# Patient Record
Sex: FEMALE | Race: WHITE | NOT HISPANIC OR LATINO | Employment: FULL TIME | URBAN - METROPOLITAN AREA
[De-identification: names, ages, dates, MRNs, and addresses within clinical notes are randomized per-mention and may not be internally consistent; named-entity substitution may affect disease eponyms.]

---

## 2019-12-13 ENCOUNTER — OFFICE VISIT (OUTPATIENT)
Dept: INTERNAL MEDICINE | Facility: CLINIC | Age: 49
End: 2019-12-13
Payer: COMMERCIAL

## 2019-12-13 VITALS
WEIGHT: 182.6 LBS | BODY MASS INDEX: 28.66 KG/M2 | HEIGHT: 67 IN | SYSTOLIC BLOOD PRESSURE: 122 MMHG | OXYGEN SATURATION: 98 % | HEART RATE: 74 BPM | RESPIRATION RATE: 16 BRPM | DIASTOLIC BLOOD PRESSURE: 82 MMHG

## 2019-12-13 DIAGNOSIS — Z11.4 SCREENING FOR HUMAN IMMUNODEFICIENCY VIRUS WITHOUT PRESENCE OF RISK FACTORS: ICD-10-CM

## 2019-12-13 DIAGNOSIS — Z13.220 SCREENING FOR CHOLESTEROL LEVEL: ICD-10-CM

## 2019-12-13 DIAGNOSIS — Z76.89 ENCOUNTER TO ESTABLISH CARE: Primary | ICD-10-CM

## 2019-12-13 DIAGNOSIS — Z13.21 ENCOUNTER FOR VITAMIN DEFICIENCY SCREENING: ICD-10-CM

## 2019-12-13 DIAGNOSIS — Z13.1 SCREENING FOR DIABETES MELLITUS: ICD-10-CM

## 2019-12-13 DIAGNOSIS — Z13.0 SCREENING FOR BLOOD DISEASE: ICD-10-CM

## 2019-12-13 DIAGNOSIS — Z23 NEED FOR PROPHYLACTIC VACCINATION AND INOCULATION AGAINST INFLUENZA: ICD-10-CM

## 2019-12-13 DIAGNOSIS — Z86.79 HISTORY OF VENTRICULAR TACHYCARDIA: ICD-10-CM

## 2019-12-13 DIAGNOSIS — Z12.31 ENCOUNTER FOR SCREENING MAMMOGRAM FOR BREAST CANCER: ICD-10-CM

## 2019-12-13 PROCEDURE — 90686 IIV4 VACC NO PRSV 0.5 ML IM: CPT | Performed by: INTERNAL MEDICINE

## 2019-12-13 PROCEDURE — 99202 OFFICE O/P NEW SF 15 MIN: CPT | Mod: 25 | Performed by: INTERNAL MEDICINE

## 2019-12-13 PROCEDURE — 90471 IMMUNIZATION ADMIN: CPT | Performed by: INTERNAL MEDICINE

## 2019-12-13 RX ORDER — METOPROLOL SUCCINATE 25 MG/1
25 TABLET, EXTENDED RELEASE ORAL DAILY
COMMUNITY
End: 2019-12-13

## 2019-12-13 RX ORDER — METOPROLOL SUCCINATE 25 MG/1
25 TABLET, EXTENDED RELEASE ORAL DAILY
Qty: 90 TABLET | Refills: 3 | Status: SHIPPED | OUTPATIENT
Start: 2019-12-13 | End: 2021-01-29

## 2019-12-13 RX ORDER — AMOXICILLIN 500 MG/1
500 CAPSULE ORAL 2 TIMES DAILY
COMMUNITY
End: 2020-01-03

## 2019-12-13 SDOH — HEALTH STABILITY: MENTAL HEALTH: HOW OFTEN DO YOU HAVE A DRINK CONTAINING ALCOHOL?: NOT ASKED

## 2019-12-13 ASSESSMENT — MIFFLIN-ST. JEOR: SCORE: 1487.96

## 2019-12-13 NOTE — PATIENT INSTRUCTIONS
Please schedule physical, pap smear, mammogram (spring), and fasting labs when able.     Refill of Toprol sent in.

## 2019-12-13 NOTE — PROGRESS NOTES
SUBJECTIVE                                                      HPI: Xenia Snow is a pleasant 49 year old female who presents to establish care:    No specific complaints, concerns, or questions.    PMH, PSH, FH, SH, medications, allergies, immunizations, and preventative health measures reviewed.     Past Medical History:   Diagnosis Date     History of ventricular tachycardia      History of ventricular tachycardia: well-controlled on Toprol.     Past Surgical History:   Procedure Laterality Date     DILATION AND CURETTAGE       DILATION AND EVACUATION       LAPAROSCOPIC APPENDECTOMY  2018     LAPAROSCOPIC CHOLECYSTECTOMY  2017     Family History   Problem Relation Age of Onset     Hypertension Mother      Hyperlipidemia Mother      Breast Cancer Mother         post-menopausal     Skin Cancer Father         unknown types     Coronary Artery Disease Maternal Grandmother         s/p CABG later in life     Myocardial Infarction Maternal Grandfather         later in life     Breast Cancer Paternal Grandmother         post-menopausal     Myocardial Infarction Maternal Uncle         later in life     Rheumatoid Arthritis Paternal Aunt      Diabetes No family hx of      Coronary Artery Disease Early Onset No family hx of      Cerebrovascular Disease No family hx of      Ovarian Cancer No family hx of      Colon Cancer No family hx of      Social History     Occupational History     Occupation: [employer] -    Tobacco Use     Smoking status: Never Smoker     Smokeless tobacco: Never Used   Substance and Sexual Activity     Alcohol use: Never     Drug use: Never     Sexual activity: Yes     Partners: Female     Birth control/protection: Male Surgical   Social History Narrative    .    4 kids (19, 17, 15, and 12 as of 2019).    Walks regularly; no formal exercise.      Allergies   Allergen Reactions     Amitiza [Lubiprostone] GI Disturbance     Current Outpatient Medications   Medication  "Sig     amoxicillin (AMOXIL) 500 MG capsule Take 500 mg by mouth 2 times daily     metoprolol succinate ER (TOPROL-XL) 25 MG 24 hr tablet Take 1 tablet (25 mg) by mouth daily     Immunization History   Administered Date(s) Administered     Influenza Vaccine IM > 6 months Valent IIV4 12/13/2019     TDAP Vaccine (Adacel) 08/01/2019     OBJECTIVE                                                      /82   Pulse 74   Resp 16   Ht 1.689 m (5' 6.5\")   Wt 82.8 kg (182 lb 9.6 oz)   LMP 11/15/2019 (Within Days)   SpO2 98%   BMI 29.03 kg/m    Constitutional: well-appearing  Musculoskeletal: normal gait and station  Psych: normal judgment and insight; normal mood and affect; recent and remote memory intact; oriented to time, place, and person    PREVENTATIVE HEALTH                                                      BMI: overweight  Blood pressure: within normal limits   Breast CA screening: up to date (performed in March, 2019 at [facility])  Cervical CA screening: DUE  Colon CA screening: not medically indicated at this time   Lung CA screening: n/a   Dexa: not medically indicated at this time   Screening HCV: n/a   Screening HIV: DUE  Screening cholesterol: DUE  Screening diabetes: DUE  STD testing: no risk factors present  Depression screening: PHQ-2 assessment completed and reviewed - no intervention indicated at this time  Alcohol misuse screening: alcohol use reviewed - no intervention indicated at this time  Immunizations: reviewed; flu shot DUE    ASSESSMENT/PLAN                                                       (Z76.89) Encounter to establish care  (primary encounter diagnosis)  Comment: PMH, PSH, FH, SH, medications, allergies, immunizations, and preventative health measures reviewed.   Plan: patient will return for a physical and pap when able; see below for remainder of plans.     (Z23) Need for prophylactic vaccination and inoculation against influenza  Plan: flu shot given " today.     (Z12.31) Encounter for screening mammogram for breast cancer  Plan: mammogram ordered - patient to schedule for spring, 2020.     (Z13.220) Screening for cholesterol level  (Z13.1) Screening for diabetes mellitus  (Z11.4) Screening for human immunodeficiency virus without presence of risk factors  (Z13.21) Encounter for vitamin deficiency screening  (Z13.0) Screening for blood disease  Plan: patient will return for fasting labs when able.     (Z86.79) History of ventricular tachycardia  Comment: well-controlled on Toprol.   Plan: CPM; refills provided.     The instructions on the AVS were discussed and explained to the patient. Patient expressed understanding of instructions.    A total of 25 minutes were spent face-to-face with this patient during this encounter and over half of that time was spent on counseling and coordination of care re: above diagnoses and plans of care.     (Chart documentation was completed, in part, with [employer] voice-recognition software. Even though reviewed, some grammatical, spelling, and word errors may remain.)    Carol Yung MD   [address] [address]  T: [phone], F: [phone]

## 2020-01-02 DIAGNOSIS — Z13.21 ENCOUNTER FOR VITAMIN DEFICIENCY SCREENING: ICD-10-CM

## 2020-01-02 DIAGNOSIS — Z13.220 SCREENING FOR CHOLESTEROL LEVEL: ICD-10-CM

## 2020-01-02 DIAGNOSIS — Z11.4 SCREENING FOR HUMAN IMMUNODEFICIENCY VIRUS WITHOUT PRESENCE OF RISK FACTORS: ICD-10-CM

## 2020-01-02 DIAGNOSIS — Z13.1 SCREENING FOR DIABETES MELLITUS: ICD-10-CM

## 2020-01-02 DIAGNOSIS — Z13.0 SCREENING FOR BLOOD DISEASE: ICD-10-CM

## 2020-01-02 DIAGNOSIS — L65.9 HAIR LOSS: ICD-10-CM

## 2020-01-02 LAB
ALBUMIN SERPL-MCNC: 3.8 G/DL (ref 3.4–5)
ALP SERPL-CCNC: 56 U/L (ref 40–150)
ALT SERPL W P-5'-P-CCNC: 23 U/L (ref 0–50)
ANION GAP SERPL CALCULATED.3IONS-SCNC: 6 MMOL/L (ref 3–14)
AST SERPL W P-5'-P-CCNC: 13 U/L (ref 0–45)
BILIRUB SERPL-MCNC: 0.3 MG/DL (ref 0.2–1.3)
BUN SERPL-MCNC: 14 MG/DL (ref 7–30)
CALCIUM SERPL-MCNC: 8.9 MG/DL (ref 8.5–10.1)
CHLORIDE SERPL-SCNC: 109 MMOL/L (ref 94–109)
CHOLEST SERPL-MCNC: 228 MG/DL
CO2 SERPL-SCNC: 24 MMOL/L (ref 20–32)
CREAT SERPL-MCNC: 0.98 MG/DL (ref 0.52–1.04)
DEPRECATED CALCIDIOL+CALCIFEROL SERPL-MC: 21 UG/L (ref 20–75)
ERYTHROCYTE [DISTWIDTH] IN BLOOD BY AUTOMATED COUNT: 14.1 % (ref 10–15)
GFR SERPL CREATININE-BSD FRML MDRD: 68 ML/MIN/{1.73_M2}
GLUCOSE SERPL-MCNC: 116 MG/DL (ref 70–99)
HCT VFR BLD AUTO: 36 % (ref 35–47)
HDLC SERPL-MCNC: 46 MG/DL
HGB BLD-MCNC: 11.7 G/DL (ref 11.7–15.7)
HIV 1+2 AB+HIV1 P24 AG SERPL QL IA: NONREACTIVE
LDLC SERPL CALC-MCNC: 119 MG/DL
MCH RBC QN AUTO: 27.7 PG (ref 26.5–33)
MCHC RBC AUTO-ENTMCNC: 32.5 G/DL (ref 31.5–36.5)
MCV RBC AUTO: 85 FL (ref 78–100)
NONHDLC SERPL-MCNC: 182 MG/DL
PLATELET # BLD AUTO: 243 10E9/L (ref 150–450)
POTASSIUM SERPL-SCNC: 4.1 MMOL/L (ref 3.4–5.3)
PROT SERPL-MCNC: 7.5 G/DL (ref 6.8–8.8)
RBC # BLD AUTO: 4.23 10E12/L (ref 3.8–5.2)
SODIUM SERPL-SCNC: 139 MMOL/L (ref 133–144)
TRIGL SERPL-MCNC: 315 MG/DL
VIT B12 SERPL-MCNC: 294 PG/ML (ref 193–986)
WBC # BLD AUTO: 7 10E9/L (ref 4–11)

## 2020-01-02 PROCEDURE — 84443 ASSAY THYROID STIM HORMONE: CPT | Performed by: INTERNAL MEDICINE

## 2020-01-02 PROCEDURE — 82728 ASSAY OF FERRITIN: CPT | Performed by: INTERNAL MEDICINE

## 2020-01-02 PROCEDURE — 82607 VITAMIN B-12: CPT | Performed by: INTERNAL MEDICINE

## 2020-01-02 PROCEDURE — 83550 IRON BINDING TEST: CPT | Performed by: INTERNAL MEDICINE

## 2020-01-02 PROCEDURE — 80061 LIPID PANEL: CPT | Performed by: INTERNAL MEDICINE

## 2020-01-02 PROCEDURE — 80053 COMPREHEN METABOLIC PANEL: CPT | Performed by: INTERNAL MEDICINE

## 2020-01-02 PROCEDURE — 85027 COMPLETE CBC AUTOMATED: CPT | Performed by: INTERNAL MEDICINE

## 2020-01-02 PROCEDURE — 82306 VITAMIN D 25 HYDROXY: CPT | Performed by: INTERNAL MEDICINE

## 2020-01-02 PROCEDURE — 87389 HIV-1 AG W/HIV-1&-2 AB AG IA: CPT | Performed by: INTERNAL MEDICINE

## 2020-01-02 PROCEDURE — 36415 COLL VENOUS BLD VENIPUNCTURE: CPT | Performed by: INTERNAL MEDICINE

## 2020-01-02 PROCEDURE — 83540 ASSAY OF IRON: CPT | Performed by: INTERNAL MEDICINE

## 2020-01-03 ENCOUNTER — OFFICE VISIT (OUTPATIENT)
Dept: INTERNAL MEDICINE | Facility: CLINIC | Age: 50
End: 2020-01-03
Payer: COMMERCIAL

## 2020-01-03 VITALS
WEIGHT: 181.8 LBS | BODY MASS INDEX: 28.53 KG/M2 | OXYGEN SATURATION: 98 % | HEIGHT: 67 IN | SYSTOLIC BLOOD PRESSURE: 122 MMHG | RESPIRATION RATE: 18 BRPM | DIASTOLIC BLOOD PRESSURE: 82 MMHG | HEART RATE: 83 BPM

## 2020-01-03 DIAGNOSIS — Z91.09 ENVIRONMENTAL ALLERGIES: ICD-10-CM

## 2020-01-03 DIAGNOSIS — E61.1 IRON DEFICIENCY: Primary | ICD-10-CM

## 2020-01-03 DIAGNOSIS — L65.9 HAIR LOSS: ICD-10-CM

## 2020-01-03 DIAGNOSIS — Z12.4 SCREENING FOR MALIGNANT NEOPLASM OF CERVIX: ICD-10-CM

## 2020-01-03 DIAGNOSIS — Z00.00 ROUTINE HISTORY AND PHYSICAL EXAMINATION OF ADULT: Primary | ICD-10-CM

## 2020-01-03 LAB
FERRITIN SERPL-MCNC: 7 NG/ML (ref 8–252)
IRON SATN MFR SERPL: 10 % (ref 15–46)
IRON SERPL-MCNC: 40 UG/DL (ref 35–180)
TIBC SERPL-MCNC: 396 UG/DL (ref 240–430)
TSH SERPL DL<=0.005 MIU/L-ACNC: 2.24 MU/L (ref 0.4–4)

## 2020-01-03 PROCEDURE — 87624 HPV HI-RISK TYP POOLED RSLT: CPT | Performed by: INTERNAL MEDICINE

## 2020-01-03 PROCEDURE — 99396 PREV VISIT EST AGE 40-64: CPT | Performed by: INTERNAL MEDICINE

## 2020-01-03 PROCEDURE — G0145 SCR C/V CYTO,THINLAYER,RESCR: HCPCS | Performed by: INTERNAL MEDICINE

## 2020-01-03 RX ORDER — LORATADINE 10 MG/1
10 TABLET ORAL DAILY
Qty: 90 TABLET | Refills: 3 | Status: SHIPPED | OUTPATIENT
Start: 2020-01-03

## 2020-01-03 RX ORDER — FLUTICASONE PROPIONATE 50 MCG
2 SPRAY, SUSPENSION (ML) NASAL DAILY
Qty: 16 G | Refills: 3 | Status: SHIPPED | OUTPATIENT
Start: 2020-01-03 | End: 2021-01-08

## 2020-01-03 ASSESSMENT — MIFFLIN-ST. JEOR: SCORE: 1484.33

## 2020-01-03 NOTE — PATIENT INSTRUCTIONS
Try adding a daily vitamin D supplement.    Additional labs (iron studies and thyroid) pending.     ---    RESTART Claritin daily and consistently.    RESTART Flonase daily and consistently.     ---

## 2020-01-03 NOTE — PROGRESS NOTES
SUBJECTIVE                                                      HPI: Xenia Snow is a pleasant 49 year old female who presents for a physical.    Patient complains of hair loss. Generalized, nonfocal. Ongoing for years, but worsening over time. Recent labs significant for borderline vitamin D level.    Patient also complains of poorly controlled environmental allergies. Previously on Claritin for many years, but not on any antihistamines currently.    ROS:  Constitutional: denies unintentional weight loss or gain; denies fevers, chills, or sweats     Cardiovascular: denies chest pain, palpitations, or edema  Respiratory: denies cough, wheezing, shortness of breath, or dyspnea on exertion  Gastrointestinal: denies nausea, vomiting, constipation, diarrhea, or abdominal pain  Genitourinary: denies urinary frequency, urgency, dysuria, or hematuria  Integumentary: see above  Musculoskeletal: denies back pain, muscle pain, joint pain, or joint swelling  Neurologic: denies focal weakness, numbness, or tingling  Hematologic/Immunologic: denies history of anemia or blood transfusions  Endocrine: denies heat or cold intolerance; denies polyuria, polydipsia  Psychiatric: denies anxiety; see preventative health below    Past Medical History:   Diagnosis Date     History of ventricular tachycardia     on Toprol     Impaired fasting glucose      Past Surgical History:   Procedure Laterality Date     DILATION AND CURETTAGE       DILATION AND EVACUATION       LAPAROSCOPIC APPENDECTOMY  2018     LAPAROSCOPIC CHOLECYSTECTOMY  2017     Family History   Problem Relation Age of Onset     Hypertension Mother      Hyperlipidemia Mother      Breast Cancer Mother         post-menopausal     Skin Cancer Father         unknown types     Coronary Artery Disease Maternal Grandmother         s/p CABG later in life     Myocardial Infarction Maternal Grandfather         later in life     Breast Cancer Paternal Grandmother          "post-menopausal     Myocardial Infarction Maternal Uncle         later in life     Rheumatoid Arthritis Paternal Aunt      Diabetes No family hx of      Coronary Artery Disease Early Onset No family hx of      Cerebrovascular Disease No family hx of      Ovarian Cancer No family hx of      Colon Cancer No family hx of      Social History     Occupational History     Occupation: Consulting -    Tobacco Use     Smoking status: Never Smoker     Smokeless tobacco: Never Used   Substance and Sexual Activity     Alcohol use: Never     Drug use: Never     Sexual activity: Yes     Partners: Female     Birth control/protection: Male Surgical   Social History Narrative    .    4 kids (19, 17, 15, and 12 as of 2019).    Walks regularly; no formal exercise.      Allergies   Allergen Reactions     Amitiza [Lubiprostone] GI Disturbance     Current Outpatient Medications   Medication Sig     metoprolol succinate ER (TOPROL-XL) 25 MG 24 hr tablet Take 1 tablet (25 mg) by mouth daily     Immunization History   Administered Date(s) Administered     Influenza Vaccine IM > 6 months Valent IIV4 12/13/2019     TDAP Vaccine (Adacel) 08/03/2019     OBJECTIVE                                                      /82   Pulse 83   Resp 18   Ht 1.689 m (5' 6.5\")   Wt 82.5 kg (181 lb 12.8 oz)   LMP 12/13/2019 (Exact Date)   SpO2 98%   BMI 28.90 kg/m    Constitutional: well-appearing  Eyes: normal conjunctivae and lids; pupils equal, round, and reactive to light  Ears, Nose, Mouth, and Throat: normal ears and nose; tympanic membranes visualized and normal; normal lips, teeth, and gums; no oropharyngeal lesions or ulcers  Neck: supple and symmetric; no lymphadenopathy; no thyromegaly or masses  Respiratory: normal respiratory effort; clear to auscultation bilaterally  Cardiovascular: regular rate and rhythm; pedal pulses palpable; no edema  Gastrointestinal: soft, non-tender, non-distended, and bowel sounds " present; no organomegaly or masses  Genitourinary: external genitalia, urethral meatus, and vagina normal; cervix visualized and normal in appearance; uterus and adnexa palpated and normal; no masses or tenderness  Musculoskeletal: normal gait and station  Psych: normal judgment and insight; normal mood and affect; recent and remote memory intact; oriented to time, place, and person    PREVENTATIVE HEALTH                                                      BMI: overweight  Blood pressure: within normal limits   Breast CA screening: scheduled for April, 2020  Cervical CA screening: DUE  Colon CA screening: not medically indicated at this time   Lung CA screening: n/a    Dexa: not medically indicated at this time   Screening HCV: n/a   Screening HIV: completed  Screening cholesterol: up to date   Screening diabetes: up to date   STD testing: no risk factors present  Depression screening: PHQ-2 assessment completed and reviewed - no intervention indicated at this time  Alcohol misuse screening: alcohol use reviewed - no intervention indicated at this time  Immunizations: reviewed; up to date     ASSESSMENT/PLAN                                                       (Z00.00) Routine history and physical examination of adult  (primary encounter diagnosis)  Comment: PMH, PSH, FH, SH, medications, allergies, immunizations, and preventative health measures reviewed.   Plan: see below for plans.     (Z12.4) Screening for malignant neoplasm of cervix  Plan: pap smear obtained today; if normal/negative, may repeat in 5 years (all prior paps within normal limits per patient).    (L65.9) Hair loss  Plan:   - adding on TSH reflex and iron studies to yesterday's labs.   - recommend daily vitamin D supplement to boost vitamin D levels.   - potential androgenic alopecia component as well.    (Z91.09) Environmental allergies  Comment: poorly controlled without medication.  Plan:    - RESTART Claritin daily and consistently.   - START  Flonase daily and consistently.   - if continued symptoms, patient to contact MD.     The instructions on the AVS were discussed and explained to the patient. Patient expressed understanding of instructions.    (Chart documentation was completed, in part, with [employer] voice-recognition software. Even though reviewed, some grammatical, spelling, and word errors may remain.)    Carol Yung MD   [address] [address]  T: [phone], F: [phone]

## 2020-01-03 NOTE — LETTER
January 13, 2020    Xenia CARRASCO Gwendolyn  [address] [place] [id number]    Dear ,  This letter is regarding your recent Pap smear (cervical cancer screening) and Human Papillomavirus (HPV) test.  We are happy to inform you that your Pap smear result is normal. Cervical cancer is closely linked with certain types of HPV. Your results showed no evidence of high-risk HPV.  We recommend you have your next PAP smear and HPV test in 5 years.  You will still need to return to the clinic every year for an annual exam and other preventive tests.  If you have additional questions regarding this result, please call our registered nurse, Angelica at [phone].  Sincerely,    Carol Yung MD //[facility]

## 2020-01-07 LAB
COPATH REPORT: NORMAL
PAP: NORMAL

## 2020-01-09 LAB
FINAL DIAGNOSIS: NORMAL
HPV HR 12 DNA CVX QL NAA+PROBE: NEGATIVE
HPV16 DNA SPEC QL NAA+PROBE: NEGATIVE
HPV18 DNA SPEC QL NAA+PROBE: NEGATIVE
SPECIMEN DESCRIPTION: NORMAL
SPECIMEN SOURCE CVX/VAG CYTO: NORMAL

## 2020-01-23 ENCOUNTER — APPOINTMENT (OUTPATIENT)
Dept: CT IMAGING | Facility: CLINIC | Age: 50
End: 2020-01-23
Attending: EMERGENCY MEDICINE
Payer: COMMERCIAL

## 2020-01-23 ENCOUNTER — HOSPITAL ENCOUNTER (EMERGENCY)
Facility: CLINIC | Age: 50
Discharge: HOME | End: 2020-01-23
Attending: EMERGENCY MEDICINE | Admitting: EMERGENCY MEDICINE
Payer: COMMERCIAL

## 2020-01-23 ENCOUNTER — APPOINTMENT (OUTPATIENT)
Dept: MRI IMAGING | Facility: CLINIC | Age: 50
End: 2020-01-23
Attending: EMERGENCY MEDICINE
Payer: COMMERCIAL

## 2020-01-23 VITALS
RESPIRATION RATE: 18 BRPM | BODY MASS INDEX: 28.93 KG/M2 | OXYGEN SATURATION: 99 % | HEART RATE: 76 BPM | HEIGHT: 66 IN | SYSTOLIC BLOOD PRESSURE: 130 MMHG | WEIGHT: 180 LBS | DIASTOLIC BLOOD PRESSURE: 76 MMHG

## 2020-01-23 DIAGNOSIS — R51.9 NONINTRACTABLE HEADACHE, UNSPECIFIED CHRONICITY PATTERN, UNSPECIFIED HEADACHE TYPE: ICD-10-CM

## 2020-01-23 DIAGNOSIS — R42 VERTIGO: ICD-10-CM

## 2020-01-23 LAB
ANION GAP SERPL CALCULATED.3IONS-SCNC: 3 MMOL/L (ref 3–14)
APTT PPP: 33 SEC (ref 22–37)
BASOPHILS # BLD AUTO: 0 10E9/L (ref 0–0.2)
BASOPHILS NFR BLD AUTO: 0.5 %
BUN SERPL-MCNC: 11 MG/DL (ref 7–30)
CALCIUM SERPL-MCNC: 8.8 MG/DL (ref 8.5–10.1)
CHLORIDE SERPL-SCNC: 107 MMOL/L (ref 94–109)
CO2 SERPL-SCNC: 30 MMOL/L (ref 20–32)
CREAT SERPL-MCNC: 0.85 MG/DL (ref 0.52–1.04)
DIFFERENTIAL METHOD BLD: NORMAL
EOSINOPHIL # BLD AUTO: 0.1 10E9/L (ref 0–0.7)
EOSINOPHIL NFR BLD AUTO: 1.5 %
ERYTHROCYTE [DISTWIDTH] IN BLOOD BY AUTOMATED COUNT: 15 % (ref 10–15)
GFR SERPL CREATININE-BSD FRML MDRD: 81 ML/MIN/{1.73_M2}
GLUCOSE BLDC GLUCOMTR-MCNC: 95 MG/DL (ref 70–99)
GLUCOSE SERPL-MCNC: 101 MG/DL (ref 70–99)
HCT VFR BLD AUTO: 38.5 % (ref 35–47)
HGB BLD-MCNC: 12.5 G/DL (ref 11.7–15.7)
IMM GRANULOCYTES # BLD: 0 10E9/L (ref 0–0.4)
IMM GRANULOCYTES NFR BLD: 0.3 %
INR PPP: 0.9 (ref 0.86–1.14)
LYMPHOCYTES # BLD AUTO: 2.3 10E9/L (ref 0.8–5.3)
LYMPHOCYTES NFR BLD AUTO: 28.8 %
MCH RBC QN AUTO: 28.7 PG (ref 26.5–33)
MCHC RBC AUTO-ENTMCNC: 32.5 G/DL (ref 31.5–36.5)
MCV RBC AUTO: 89 FL (ref 78–100)
MONOCYTES # BLD AUTO: 0.6 10E9/L (ref 0–1.3)
MONOCYTES NFR BLD AUTO: 7.5 %
NEUTROPHILS # BLD AUTO: 4.9 10E9/L (ref 1.6–8.3)
NEUTROPHILS NFR BLD AUTO: 61.4 %
NRBC # BLD AUTO: 0 10*3/UL
NRBC BLD AUTO-RTO: 0 /100
PLATELET # BLD AUTO: 265 10E9/L (ref 150–450)
POTASSIUM SERPL-SCNC: 3.6 MMOL/L (ref 3.4–5.3)
RBC # BLD AUTO: 4.35 10E12/L (ref 3.8–5.2)
SODIUM SERPL-SCNC: 140 MMOL/L (ref 133–144)
TROPONIN I SERPL-MCNC: <0.015 UG/L (ref 0–0.04)
WBC # BLD AUTO: 7.9 10E9/L (ref 4–11)

## 2020-01-23 PROCEDURE — 0042T CT HEAD PERFUSION WITH CONTRAST: CPT

## 2020-01-23 PROCEDURE — A9585 GADOBUTROL INJECTION: HCPCS | Performed by: EMERGENCY MEDICINE

## 2020-01-23 PROCEDURE — 25000128 H RX IP 250 OP 636: Performed by: EMERGENCY MEDICINE

## 2020-01-23 PROCEDURE — 25500064 ZZH RX 255 OP 636: Performed by: EMERGENCY MEDICINE

## 2020-01-23 PROCEDURE — 99285 EMERGENCY DEPT VISIT HI MDM: CPT | Mod: 25

## 2020-01-23 PROCEDURE — 85025 COMPLETE CBC W/AUTO DIFF WBC: CPT | Performed by: EMERGENCY MEDICINE

## 2020-01-23 PROCEDURE — 85610 PROTHROMBIN TIME: CPT | Performed by: EMERGENCY MEDICINE

## 2020-01-23 PROCEDURE — 00000146 ZZHCL STATISTIC GLUCOSE BY METER IP

## 2020-01-23 PROCEDURE — 80048 BASIC METABOLIC PNL TOTAL CA: CPT | Performed by: EMERGENCY MEDICINE

## 2020-01-23 PROCEDURE — 70450 CT HEAD/BRAIN W/O DYE: CPT | Mod: XS

## 2020-01-23 PROCEDURE — 70553 MRI BRAIN STEM W/O & W/DYE: CPT

## 2020-01-23 PROCEDURE — 96374 THER/PROPH/DIAG INJ IV PUSH: CPT | Mod: 59

## 2020-01-23 PROCEDURE — 25000132 ZZH RX MED GY IP 250 OP 250 PS 637: Performed by: EMERGENCY MEDICINE

## 2020-01-23 PROCEDURE — 70496 CT ANGIOGRAPHY HEAD: CPT

## 2020-01-23 PROCEDURE — 84484 ASSAY OF TROPONIN QUANT: CPT | Performed by: EMERGENCY MEDICINE

## 2020-01-23 PROCEDURE — 85730 THROMBOPLASTIN TIME PARTIAL: CPT | Performed by: EMERGENCY MEDICINE

## 2020-01-23 PROCEDURE — 25000125 ZZHC RX 250: Performed by: EMERGENCY MEDICINE

## 2020-01-23 RX ORDER — IOPAMIDOL 755 MG/ML
500 INJECTION, SOLUTION INTRAVASCULAR ONCE
Status: COMPLETED | OUTPATIENT
Start: 2020-01-23 | End: 2020-01-23

## 2020-01-23 RX ORDER — ONDANSETRON 2 MG/ML
4 INJECTION INTRAMUSCULAR; INTRAVENOUS
Status: DISCONTINUED | OUTPATIENT
Start: 2020-01-23 | End: 2020-01-24 | Stop reason: HOSPADM

## 2020-01-23 RX ORDER — GADOBUTROL 604.72 MG/ML
7.5 INJECTION INTRAVENOUS ONCE
Status: COMPLETED | OUTPATIENT
Start: 2020-01-23 | End: 2020-01-23

## 2020-01-23 RX ORDER — MECLIZINE HYDROCHLORIDE 25 MG/1
25 TABLET ORAL ONCE
Status: COMPLETED | OUTPATIENT
Start: 2020-01-23 | End: 2020-01-23

## 2020-01-23 RX ORDER — ONDANSETRON 4 MG/1
4 TABLET, ORALLY DISINTEGRATING ORAL EVERY 8 HOURS PRN
Qty: 10 TABLET | Refills: 0 | Status: SHIPPED | OUTPATIENT
Start: 2020-01-23 | End: 2020-01-26

## 2020-01-23 RX ORDER — MECLIZINE HYDROCHLORIDE 25 MG/1
25 TABLET ORAL 3 TIMES DAILY PRN
Qty: 12 TABLET | Refills: 0 | Status: SHIPPED | OUTPATIENT
Start: 2020-01-23 | End: 2021-01-08

## 2020-01-23 RX ADMIN — MECLIZINE HYDROCHLORIDE 25 MG: 25 TABLET ORAL at 22:44

## 2020-01-23 RX ADMIN — MECLIZINE HYDROCHLORIDE 25 MG: 25 TABLET ORAL at 20:26

## 2020-01-23 RX ADMIN — GADOBUTROL 7.5 ML: 604.72 INJECTION INTRAVENOUS at 21:12

## 2020-01-23 RX ADMIN — IOPAMIDOL 120 ML: 755 INJECTION, SOLUTION INTRAVENOUS at 19:42

## 2020-01-23 RX ADMIN — ONDANSETRON HYDROCHLORIDE 4 MG: 2 INJECTION, SOLUTION INTRAMUSCULAR; INTRAVENOUS at 20:26

## 2020-01-23 RX ADMIN — SODIUM CHLORIDE 95 ML: 9 INJECTION, SOLUTION INTRAVENOUS at 19:42

## 2020-01-23 ASSESSMENT — MIFFLIN-ST. JEOR: SCORE: 1468.22

## 2020-01-23 NOTE — ED AVS SNAPSHOT
[facility] Emergency Department  [address]  [facility] [address]  Phone:  [phone]  Fax:  [phone]                                    Xenia Snow   MRN: [id number]    Department:  [facility] Emergency Department   Date of Visit:  1/23/2020           After Visit Summary Signature Page    I have received my discharge instructions, and my questions have been answered. I have discussed any challenges I see with this plan with the nurse or doctor.    ..........................................................................................................................................  Patient/Patient Representative Signature      ..........................................................................................................................................  Patient Representative Print Name and Relationship to Patient    ..................................................               ................................................  Date                                   Time    ..........................................................................................................................................  Reviewed by Signature/Title    ...................................................              ..............................................  Date                                               Time          22EPIC Rev 08/18

## 2020-01-24 LAB — INTERPRETATION ECG - MUSE: NORMAL

## 2020-01-24 NOTE — ED NOTES
Assisted pt with ambulation trial. Pt only made it into the hallway with walker before taking a seat on walker. Stated that her head felt off but feet felt steady.

## 2020-01-24 NOTE — ED PROVIDER NOTES
"  History     Chief Complaint:  Dizziness    HPI   Xenia Snow is a 49 year old female who presents dizziness, vision changes, and headache approximately 130 today the patient was looking at her phone and became suddenly dizzy, described as vertiginous dizziness and disequilibrium.  Associated with this, she a headache to the right temporal occipital scalp described as a fluttering discomfort.  Since then she has had vision changes as well as eye discomfort and nausea.  Finally she notes 3 views of her shades at home and doing so inappropriately, even though she is moves them multiple times in the past -she was concerned this might represent confusion versus discoordination.    Allergies:  Amitiza     Medications:    Toprol-XL  Claritin   Flonase  Ferrous sulfate     Past Medical History:    Ventricular tachycardia  Impaired fasting glucose    Past Surgical History:    Dilation and curettage  Dilation and evacuation   Laparoscopic appendectomy   Laparoscopic cholecystectomy     Family History:    Mother: hypertension, hyperlipidemia, breast cancer  Father: skin cancer    Social History:  Smoking Status: Never Smoker  Smokeless Tobacco: Never Used  Alcohol Use: Negative   Drug Use: Negative   Marital Status:   [2]     Review of Systems  Neuro: + as per above  GI: + as per above  All other systems reviewed and negative      Physical Exam     Patient Vitals for the past 24 hrs:   BP Pulse Heart Rate Resp SpO2 Height Weight   01/23/20 2100 126/77 84 81 -- 98 % -- --   01/23/20 2045 128/77 -- 82 -- 98 % -- --   01/23/20 2030 -- -- 86 -- 97 % -- --   01/23/20 2015 129/79 -- 80 -- 96 % -- --   01/23/20 1932 -- -- -- -- -- 1.676 m (5' 6\") 81.6 kg (180 lb)   01/23/20 1931 (!) 150/93 86 -- 18 100 % -- --        Physical Exam  Constitutional: Alert, attentive, dizzy  HENT:    Nose: Nose normal.    Mouth/Throat: Oropharynx is clear, mucous membranes are moist  Eyes: EOM are normal. Pupils are equal, round, and " reactive to light.   CV: Regular rate and rhythm, no murmurs, rubs or gallops.  Chest: Effort normal and breath sounds normal.   GI: No distension. There is no tenderness  MSK: Normal range of motion.   Neurological:   A/Ox3;   Cranial nerves 2-12 intact but difficulty participating with EOMI due to vertigo  5/5 strength throughout the upper and lower extremities;   sensation intact to light touch throughout the upper and lower extremities;   Skin: Skin is warm and dry.        Emergency Department Course     ECG:  Time: 2148  Vent. Rate 79 bpm. SC interval 156. QRS duration 88. QT/QTc 400/458. P-R-T axis 60 44 21.  Normal sinus rhythm   Normal EKG   Read time: 2150      Imaging:  Radiographic findings were communicated with the patient who voiced understanding of the findings.    CTA Head Neck with Contrast  IMPRESSION:      HEAD CTA:   1.  No evidence for hemodynamically significant stenosis intracranially or evidence for thrombolic occlusion. No aneurysm. Normal variant anatomy [place] and dural venous sinuses is noted.     NECK CTA:  1.  Normal neck CTA. Reading per radiology.    CT Head w/o Contrast  IMPRESSION:  No acute intracranial process. Reading per radiology. Discussed with Dr. Ross at 1/23/2020 7:49 PM    CT Head Perfusion w Contrast  IMPRESSION:      CT PERFUSION:  1.  Normal cerebral perfusion. Reading per radiology.    MR Brain w/o & w Contrast  IMPRESSION:  1.  No evidence for recent infarction.     2.  No intracranial mass, mass effect, or abnormal enhancement.     3.  No intracranial hemorrhage.     4.  Additional details above. Reading per radiology.    Laboratory:  CBC: WBC: 7.9, HGB 12.5, PLT: 265    BMP: Glucose 101 (H), o/w WNL (Creatinine: 0.85)    1933 Troponin I: <0.015    INR: 0.90    PTT: 33      Interventions:  2026 Antivert 25 mg PO  2026 Zofran 4 mg IV    Emergency Department Course:  Past medical records, nursing notes, and vitals reviewed.  1925: I performed an exam of  the patient and obtained history, as documented above.     Code stroke called.    The patient was sent for a CTA Head Neck, CT Head, CT Head Perfusion, MR Brain, while in the emergency department, results above.      IV was inserted and blood was drawn for laboratory testing, results above.     EKG obtained in the ED, see results above.      Medication administered.     1942 I spoke with Dr. Deras, Stroke Neurology, regarding the patient.     1949 I spoke with Dr. Lay, Radiology, regarding the patient.     2153 I rechecked and updated the patient.     Findings and plan explained to the Patient. Patient discharged home with instructions regarding supportive care, medications, and reasons to return. The importance of close follow-up was reviewed.      I personally reviewed the laboratory and results with the Patient and answered all related questions prior to discharge.     Impression & Plan      Medical Decision Making:  This is a 49-year-old female with history of ventricular tachycardia who presents for evaluation of headache, vision changes, vertigo, and possible confusion versus discoordination episode.  This is overall concerning for possible central vertigo from aneurysmal or other cervical vessel disease versus posterior circulation stroke.  Code stroke was activated and fortunately initial studies are negative.  On recheck she remains symptomatic.  MR brain was performed to rule out possible or subtle thromboembolic posterior circulation event and is fortunately negative as well.  Recheck symptoms are improved.  She does not have consistent reproducible symptoms with leftward rightward gaze and would not like to undergo Jerry-Hallpike testing at this time given a mild to moderate continued symptoms.  She is ambulatory would like to return home.  This is very reasonable given likely peripheral vertigo.  Plan continued supportive cares and ENT follow-up in 2 to 3 days.  Strict return precautions for worse  symptoms, vomiting, or any other concerns.    Diagnosis:    ICD-10-CM    1. Vertigo R42    2. Nonintractable headache, unspecified chronicity pattern, unspecified headache type R51        Disposition:  Home in improved condition      Anoop Ross MD  [facility]  [facility] Emergency Department      Discharge Medications:  Discharge Medication List as of 1/23/2020 10:41 PM      START taking these medications    Details   meclizine (ANTIVERT) 25 MG tablet Take 1 tablet (25 mg) by mouth 3 times daily as needed for dizziness, Disp-12 tablet, R-0, Local Print      ondansetron (ZOFRAN ODT) 4 MG ODT tab Take 1 tablet (4 mg) by mouth every 8 hours as needed for nausea, Disp-10 tablet, R-0, Local Print             Anoop Ross   1/23/2020   [facility] EMERGENCY DEPARTMENT       Anoop Ross MD  01/23/20 [id number]

## 2020-01-24 NOTE — ED TRIAGE NOTES
1330 started feeling suddenly dizzy. Pt states she has had vtach and it felt like a somersault, but in her head. Pt states this lasted for several seconds and then felt like she was going to pass out. Pt states she felt unaware for a while. Continues to have dizziness and nausea. States she feels off and not right. Mild headache now. Pt also describes difficulty with blinds, having trouble opening them. Vision trouble as well. RN notified MD of stroke ishan upon arrival to room.

## 2020-01-26 ENCOUNTER — TRANSFERRED RECORDS (OUTPATIENT)
Dept: HEALTH INFORMATION MANAGEMENT | Facility: CLINIC | Age: 50
End: 2020-01-26

## 2020-02-27 ENCOUNTER — OFFICE VISIT (OUTPATIENT)
Dept: URGENT CARE | Facility: URGENT CARE | Age: 50
End: 2020-02-27
Payer: COMMERCIAL

## 2020-02-27 VITALS
TEMPERATURE: 97 F | DIASTOLIC BLOOD PRESSURE: 70 MMHG | OXYGEN SATURATION: 99 % | HEART RATE: 70 BPM | SYSTOLIC BLOOD PRESSURE: 118 MMHG

## 2020-02-27 DIAGNOSIS — M54.50 ACUTE BILATERAL LOW BACK PAIN WITHOUT SCIATICA: ICD-10-CM

## 2020-02-27 DIAGNOSIS — S39.012A LOW BACK STRAIN, INITIAL ENCOUNTER: Primary | ICD-10-CM

## 2020-02-27 DIAGNOSIS — M62.830 BACK MUSCLE SPASM: ICD-10-CM

## 2020-02-27 PROCEDURE — 99214 OFFICE O/P EST MOD 30 MIN: CPT | Performed by: PHYSICIAN ASSISTANT

## 2020-02-27 RX ORDER — IBUPROFEN 800 MG/1
800 TABLET, FILM COATED ORAL EVERY 8 HOURS PRN
Qty: 30 TABLET | Refills: 0 | Status: SHIPPED | OUTPATIENT
Start: 2020-02-27 | End: 2021-01-08

## 2020-02-27 RX ORDER — HYDROCODONE BITARTRATE AND ACETAMINOPHEN 5; 325 MG/1; MG/1
1 TABLET ORAL EVERY 6 HOURS PRN
Qty: 14 TABLET | Refills: 0 | Status: SHIPPED | OUTPATIENT
Start: 2020-02-27 | End: 2020-03-01

## 2020-02-27 RX ORDER — METHOCARBAMOL 750 MG/1
750 TABLET, FILM COATED ORAL 4 TIMES DAILY PRN
Qty: 28 TABLET | Refills: 0 | Status: SHIPPED | OUTPATIENT
Start: 2020-02-27 | End: 2021-01-08

## 2020-02-27 NOTE — PROGRESS NOTES
SUBJECTIVE  HPI: Xenia Snow is a 49 year old female who presents for evaluation of back pain  Symptoms began 5 day(s) ago, have been onset gradual and are worse.  Pain is located in the low back bilateral region, with radiation to does not radiate, and are at worst a 5 on a scale of 1-10.  Recent injury:recent heavy lifting and turning/bending   Personal hx of back pain is recurrent self limited episodes of low back pain in the past.  Pain is exacerbated by: bending and changing position.  Pain is relieved by: heat and ice   Associated sx include: spasms.  Red flag symptoms: negative, fever, chills, night sweats.    Past Medical History:   Diagnosis Date     History of ventricular tachycardia     on Toprol     Impaired fasting glucose      Allergies   Allergen Reactions     Amitiza [Lubiprostone] GI Disturbance     Social History     Tobacco Use     Smoking status: Never Smoker     Smokeless tobacco: Never Used   Substance Use Topics     Alcohol use: Never     Family History   Problem Relation Age of Onset     Hypertension Mother      Hyperlipidemia Mother      Breast Cancer Mother         post-menopausal     Skin Cancer Father         unknown types     Coronary Artery Disease Maternal Grandmother         s/p CABG later in life     Myocardial Infarction Maternal Grandfather         later in life     Breast Cancer Paternal Grandmother         post-menopausal     Myocardial Infarction Maternal Uncle         later in life     Rheumatoid Arthritis Paternal Aunt      Diabetes No family hx of      Coronary Artery Disease Early Onset No family hx of      Cerebrovascular Disease No family hx of      Ovarian Cancer No family hx of      Colon Cancer No family hx of          ROS:  CONSTITUTIONAL:NEGATIVE for fever, chills, change in weight  INTEGUMENTARY/SKIN: NEGATIVE for worrisome rashes, moles or lesions  EYES: NEGATIVE for vision changes or irritation  ENT/MOUTH: NEGATIVE for ear, mouth and throat  problems  RESP:NEGATIVE for significant cough or SOB  CV: NEGATIVE for chest pain, palpitations or peripheral edema  GI: NEGATIVE for nausea, abdominal pain, heartburn, or change in bowel habits  : negative for and dysuria  MUSCULOSKELETAL: POSITIVE  for lower back pain, spasms  VASC: NEGATIVE for cool extremities  NEURO: NEGATIVE for weakness, dizziness or paresthesias    OBJECTIVE:  /70   Pulse 70   Temp 97  F (36.1  C) (Tympanic)   SpO2 99%   Back examination: Back symmetric, no curvature. ROM normal. No CVA tenderness., positive findings: limitation of motion - , tenderness to palpation lower SI joints  STRAIGHT leg raise test: not done  GENERAL APPEARANCE: healthy, alert and no distress  NECK: supple, non-tender to palpation, no adenopathy noted  RESP: lungs clear to auscultation - no rales, rhonchi or wheezes  CV: regular rates and rhythm, normal S1 S2, no murmur noted  ABDOMEN:  soft, nontender, no HSM or masses and bowel sounds normal  NEURO: Normal strength and tone with no weakness or sensory deficit noted, reflexes normal   Extremities: no peripheral edema or tenderness, peripheral pulses normal  MS:  tender to palpation with spasms  SKIN: no suspicious lesions or rashes    ASSESSMENT/IMPRESSION/PLAN      ICD-10-CM    1. Low back strain, initial encounter S39.012A ibuprofen (ADVIL/MOTRIN) 800 MG tablet     methocarbamol (ROBAXIN) 750 MG tablet     HYDROcodone-acetaminophen (NORCO) 5-325 MG tablet   2. Back muscle spasm M62.830 methocarbamol (ROBAXIN) 750 MG tablet   3. Acute bilateral low back pain without sciatica M54.5 ibuprofen (ADVIL/MOTRIN) 800 MG tablet     HYDROcodone-acetaminophen (NORCO) 5-325 MG tablet     EDUCATION      1.  Continue stretching and strengthening exercises.       2.  Continue prn heat or ice application.    Orders Placed This Encounter     ibuprofen (ADVIL/MOTRIN) 800 MG tablet     methocarbamol (ROBAXIN) 750 MG tablet     HYDROcodone-acetaminophen (NORCO) 5-325 MG  tablet     Follow up with PCP as needed

## 2021-01-08 ENCOUNTER — OFFICE VISIT (OUTPATIENT)
Dept: INTERNAL MEDICINE | Facility: CLINIC | Age: 51
End: 2021-01-08
Payer: COMMERCIAL

## 2021-01-08 VITALS
HEIGHT: 66 IN | RESPIRATION RATE: 16 BRPM | SYSTOLIC BLOOD PRESSURE: 114 MMHG | HEART RATE: 74 BPM | OXYGEN SATURATION: 99 % | WEIGHT: 174 LBS | BODY MASS INDEX: 27.97 KG/M2 | TEMPERATURE: 97.4 F | DIASTOLIC BLOOD PRESSURE: 80 MMHG

## 2021-01-08 DIAGNOSIS — Z12.31 ENCOUNTER FOR SCREENING MAMMOGRAM FOR BREAST CANCER: ICD-10-CM

## 2021-01-08 DIAGNOSIS — M54.41 CHRONIC BILATERAL LOW BACK PAIN WITH BILATERAL SCIATICA: ICD-10-CM

## 2021-01-08 DIAGNOSIS — M54.42 CHRONIC BILATERAL LOW BACK PAIN WITH BILATERAL SCIATICA: ICD-10-CM

## 2021-01-08 DIAGNOSIS — Z11.59 ENCOUNTER FOR HEPATITIS C SCREENING TEST FOR LOW RISK PATIENT: ICD-10-CM

## 2021-01-08 DIAGNOSIS — G89.29 CHRONIC BILATERAL LOW BACK PAIN WITH BILATERAL SCIATICA: ICD-10-CM

## 2021-01-08 DIAGNOSIS — Z00.00 ROUTINE HISTORY AND PHYSICAL EXAMINATION OF ADULT: Primary | ICD-10-CM

## 2021-01-08 DIAGNOSIS — Z13.220 SCREENING FOR CHOLESTEROL LEVEL: ICD-10-CM

## 2021-01-08 DIAGNOSIS — Z13.1 SCREENING FOR DIABETES MELLITUS: ICD-10-CM

## 2021-01-08 PROCEDURE — 99396 PREV VISIT EST AGE 40-64: CPT | Performed by: INTERNAL MEDICINE

## 2021-01-08 ASSESSMENT — MIFFLIN-ST. JEOR: SCORE: 1436.01

## 2021-01-08 NOTE — PROGRESS NOTES
ASSESSMENT/PLAN                                                       (Z00.00) Routine history and physical examination of adult  (primary encounter diagnosis)  Comment: PMH, PSH, FH, SH, medications, allergies, immunizations, and preventative health measures reviewed and updated as appropriate.  Plan: see below for plans.      (Z13.220) Screening for cholesterol level  (Z13.1) Screening for diabetes mellitus  (Z11.59) Encounter for hepatitis C screening test for low risk patient  Plan: fasting labs ordered - patient to schedule.     (Z12.31) Encounter for screening mammogram for breast cancer  Plan: screening mammogram ordered - patient to schedule.     (M54.42,  M54.41,  G89.29) Chronic bilateral low back pain with bilateral sciatica  Plan: referred to PT for further evaluation and treatment - patient will be contacted to schedule.      Carol Yung MD   [address] [address]  T: [phone], F: [phone]    SUBJECTIVE                                                      Xenia Snow is a very pleasant 50 year old female who presents for a physical.    Patient has been struggling with chronic lower back pain. Back pain is midline and bilateral with radiation down into both buttocks and occasionally to the left thigh. Pain is worse with prolonged activity. No lower extremity numbness, tingling, or weakness. No urinary retention or incontinence.  No fecal incontinence.    ROS:  Constitutional: no unintentional weight loss or gain reported; no fevers, chills, or sweats reported  Cardiovascular: no chest pain, palpitations, or edema reported  Respiratory: no cough, wheezing, shortness of breath, or dyspnea on exertion reported  Gastrointestinal: no nausea, vomiting, constipation, diarrhea, or abdominal pain reported  Genitourinary: no urinary frequency, urgency, dysuria, or hematuria reported  Integumentary: no rash or pruritus  reported  Musculoskeletal: see above  Neurologic: no focal weakness, numbness, or tingling reported  Hematologic: no easy bruising or bleeding reported  Endocrine: no heat or cold intolerance reported; no polyuria or polydipsia reported  Psychiatric: no anxiety or depression reported    Past Medical History:   Diagnosis Date     History of ventricular tachycardia     on Toprol     Impaired fasting glucose      Past Surgical History:   Procedure Laterality Date     DILATION AND CURETTAGE       DILATION AND EVACUATION       LAPAROSCOPIC APPENDECTOMY  2018     LAPAROSCOPIC CHOLECYSTECTOMY  2017     Family History   Problem Relation Age of Onset     Hypertension Mother      Hyperlipidemia Mother      Breast Cancer Mother         post-menopausal     Skin Cancer Father         unknown types     Coronary Artery Disease Maternal Grandmother         s/p CABG later in life     Myocardial Infarction Maternal Grandfather         later in life     Breast Cancer Paternal Grandmother         post-menopausal     Myocardial Infarction Maternal Uncle         later in life     Rheumatoid Arthritis Paternal Aunt      Diabetes No family hx of      Coronary Artery Disease Early Onset No family hx of      Cerebrovascular Disease No family hx of      Ovarian Cancer No family hx of      Colon Cancer No family hx of      Social History     Occupational History     Occupation: Consulting -    Tobacco Use     Smoking status: Never Smoker     Smokeless tobacco: Never Used   Substance and Sexual Activity     Alcohol use: Never     Drug use: Never     Sexual activity: Yes     Partners: Female     Birth control/protection: Male Surgical   Social History Narrative    .    4 kids (19, 18, 16, and 13 as of 2020).    Exercising regularly.      Allergies   Allergen Reactions     Amitiza [Lubiprostone] GI Disturbance     Current Outpatient Medications   Medication Sig     loratadine (CLARITIN) 10 MG tablet Take 1 tablet (10 mg)  "by mouth daily     metoprolol succinate ER (TOPROL-XL) 25 MG 24 hr tablet Take 1 tablet (25 mg) by mouth daily     Immunization History   Administered Date(s) Administered     Influenza Vaccine IM > 6 months Valent IIV4 12/13/2019     TDAP Vaccine (Adacel) 08/03/2019     PREVENTATIVE HEALTH                                                      BMI: overweight  Blood pressure: within normal limits   Breast CA screening: DUE  Cervical CA screening: up to date   Colon CA screening: not medically indicated at this time   Lung CA screening: n/a   Dexa: not medically indicated at this time   Screening HCV: completed  Screening HIV: DUE  Screening cholesterol: DUE  Screening diabetes: DUE  STD testing: no risk factors present  Alcohol misuse screening: alcohol use reviewed - no intervention indicated at this time  Immunizations: reviewed; up to date     OBJECTIVE                                                      /80 (BP Location: Left arm, Patient Position: Chair, Cuff Size: Adult Regular)   Pulse 74   Temp 97.4  F (36.3  C) (Temporal)   Resp 16   Ht 1.676 m (5' 6\")   Wt 78.9 kg (174 lb)   LMP 12/17/2020 (Exact Date)   SpO2 99%   BMI 28.08 kg/m    Constitutional: well-appearing  Head, Ears, and Eyes: normocephalic; normal external auditory canal and pinna; tympanic membranes visualized and normal; normal lids and conjunctivae  Neck: supple, symmetric, no thyromegaly or lymphadenopathy  Respiratory: normal respiratory effort; clear to auscultation bilaterally  Cardiovascular: regular rate and rhythm; no edema  Gastrointestinal: soft, non-tender, and non-distended; no organomegaly or masses  Musculoskeletal: normal gait and station  Psych: normal judgment and insight; normal mood and affect; recent and remote memory intact    ---  (Note was completed, in part, with [employer] voice-recognition software. Documentation was reviewed, but some grammatical, spelling, and word errors may remain.)        "

## 2021-01-14 ENCOUNTER — THERAPY VISIT (OUTPATIENT)
Dept: PHYSICAL THERAPY | Facility: CLINIC | Age: 51
End: 2021-01-14
Attending: INTERNAL MEDICINE
Payer: COMMERCIAL

## 2021-01-14 DIAGNOSIS — M54.50 CHRONIC BILATERAL LOW BACK PAIN WITHOUT SCIATICA: ICD-10-CM

## 2021-01-14 DIAGNOSIS — M54.42 CHRONIC BILATERAL LOW BACK PAIN WITH BILATERAL SCIATICA: ICD-10-CM

## 2021-01-14 DIAGNOSIS — M54.41 CHRONIC BILATERAL LOW BACK PAIN WITH BILATERAL SCIATICA: ICD-10-CM

## 2021-01-14 DIAGNOSIS — G89.29 CHRONIC BILATERAL LOW BACK PAIN WITHOUT SCIATICA: ICD-10-CM

## 2021-01-14 DIAGNOSIS — G89.29 CHRONIC BILATERAL LOW BACK PAIN WITH BILATERAL SCIATICA: ICD-10-CM

## 2021-01-14 PROCEDURE — 97161 PT EVAL LOW COMPLEX 20 MIN: CPT | Mod: GP | Performed by: PHYSICAL THERAPIST

## 2021-01-14 PROCEDURE — 97110 THERAPEUTIC EXERCISES: CPT | Mod: GP | Performed by: PHYSICAL THERAPIST

## 2021-01-14 PROCEDURE — 97530 THERAPEUTIC ACTIVITIES: CPT | Mod: GP | Performed by: PHYSICAL THERAPIST

## 2021-01-15 NOTE — PROGRESS NOTES
[facility] Initial Evaluation    Physical Therapy Initial Examination/Evaluation  January 14, 2021    Xenia Snow  is a 50 year old  female referred to physical therapy by Dr. Carol Yung for treatment of LBP.      DOI/onset Feb 2020  Mechanism of injury iniitally developed increased LBP shoveling snow. The pain has been off and on since but more frequent.  DOS n/a  Prior treatment none.    Chief Complaint:   Limited activity due to LBP.   Pain location: B LS area with occasional radiation into B buttocks  Quality: sometimes sharp  Constant/Intermittent: intermittent  Time of day: no time pattern  Symptoms have started to improve since onset.    Current pain 2/10.  Pain at best 1/10.  Pain at worst 4/10.    Symptoms aggravated by lifting and prolonged sitting at work.    Symptoms improved with movement. Recently started a regular exercise regimen at home and has noted less frequent LBP.  Also lost 25 pounds since October which has helped.     Social history:  Lives with  and 4 children.    Occupation: .  Job duties:  Computer work, prolonged sitting.    Patient having difficulty with ADLs: none.    Patient's goals are to reduce LBP.    Patient reports general health as good.  Related medical history had scoliosis as a kid.    Surgical History:  none.    Imaging: none.    Medications:  none.       Return to MD:  As needed.      Clinical Impression: Patient should benefit from continued PT intervention to establish a home back stretching and strengthening exercise program.    Subjective:  HPI                    Objective:  Standing Alignment:          Pelvic:  ASIS high L and iliac crest high L          Gait:    Gait Type:  Normal         Flexibility/Screens:   Positive screens:  Lumbar    Lower Extremity:      Decreased right lower extremity flexibility:  Hamstrings               Lumbar/SI Evaluation  ROM:  AROM Lumbar: normal    Strength: lower abdominals  4-/5; extensors 4/5  Lumbar Myotomes:  normal                Lumbar Dermtomes:  normal                Neural Tension/Mobility:      Left side:SLR; Slump or Bradley-Lumbar  negative.   Right side:   Bradley-Lumbar positive.  Right side:   Slump or SLR  negative.   Lumbar Palpation:  Palpation (lumbar): diffuse MF tightness/tenderness in B LS musculature.  Tenderness present at Left:    Quadratus Lumborum and Erector Spinae  Tenderness present at Right: Quadratus Lumborum and Erector Spinae      Spinal Segmental Conclusions: Pain with P/A glide L4-5  Normal segmental mobility lumbar spine          SI joint/Sacrum:    SI joint provocation tests (-)                                                       General     ROS    Assessment/Plan:    Patient is a 50 year old female with lumbar complaints.    Patient has the following significant findings with corresponding treatment plan.                Diagnosis 1:  LBP  Pain -  self management and education  Decreased strength - therapeutic exercise and therapeutic activities  Impaired muscle performance - neuro re-education  Decreased function - therapeutic activities    Therapy Evaluation Codes:   1) History comprised of:   Personal factors that impact the plan of care:      None.    Comorbidity factors that impact the plan of care are:      None.     Medications impacting care: None.  2) Examination of Body Systems comprised of:   Body structures and functions that impact the plan of care:      Lumbar spine.   Activity limitations that impact the plan of care are:      Lifting and Sitting.  3) Clinical presentation characteristics are:   Stable/Uncomplicated.  4) Decision-Making    Low complexity using standardized patient assessment instrument and/or measureable assessment of functional outcome.  Cumulative Therapy Evaluation is: Low complexity.    Previous and current functional limitations:  (See Goal Flow Sheet for this information)    Short term and Long term goals: (See Goal  Flow Sheet for this information)     Communication ability:  Patient appears to be able to clearly communicate and understand verbal and written communication and follow directions correctly.  Treatment Explanation - The following has been discussed with the patient:   RX ordered/plan of care  Anticipated outcomes  Possible risks and side effects  This patient would benefit from PT intervention to resume normal activities.   Rehab potential is good.    Frequency:  1 X week, once daily  Duration:  for 2 weeks tapering to 2 X a month over 4 weeks  Discharge Plan:  Achieve all LTG.  Independent in home treatment program.  Reach maximal therapeutic benefit.    Please refer to the daily flowsheet for treatment today, total treatment time and time spent performing 1:1 timed codes.

## 2021-01-21 ENCOUNTER — THERAPY VISIT (OUTPATIENT)
Dept: PHYSICAL THERAPY | Facility: CLINIC | Age: 51
End: 2021-01-21
Payer: COMMERCIAL

## 2021-01-21 ENCOUNTER — ANCILLARY PROCEDURE (OUTPATIENT)
Dept: MAMMOGRAPHY | Facility: CLINIC | Age: 51
End: 2021-01-21
Attending: INTERNAL MEDICINE
Payer: COMMERCIAL

## 2021-01-21 DIAGNOSIS — Z13.220 SCREENING FOR CHOLESTEROL LEVEL: ICD-10-CM

## 2021-01-21 DIAGNOSIS — G89.29 CHRONIC BILATERAL LOW BACK PAIN WITHOUT SCIATICA: ICD-10-CM

## 2021-01-21 DIAGNOSIS — M54.50 CHRONIC BILATERAL LOW BACK PAIN WITHOUT SCIATICA: ICD-10-CM

## 2021-01-21 DIAGNOSIS — Z12.31 ENCOUNTER FOR SCREENING MAMMOGRAM FOR BREAST CANCER: ICD-10-CM

## 2021-01-21 DIAGNOSIS — Z11.59 ENCOUNTER FOR HEPATITIS C SCREENING TEST FOR LOW RISK PATIENT: ICD-10-CM

## 2021-01-21 DIAGNOSIS — Z13.1 SCREENING FOR DIABETES MELLITUS: ICD-10-CM

## 2021-01-21 DIAGNOSIS — Z12.31 VISIT FOR SCREENING MAMMOGRAM: ICD-10-CM

## 2021-01-21 LAB
ALBUMIN SERPL-MCNC: 4 G/DL (ref 3.4–5)
ALP SERPL-CCNC: 55 U/L (ref 40–150)
ALT SERPL W P-5'-P-CCNC: 19 U/L (ref 0–50)
ANION GAP SERPL CALCULATED.3IONS-SCNC: 5 MMOL/L (ref 3–14)
AST SERPL W P-5'-P-CCNC: 12 U/L (ref 0–45)
BILIRUB SERPL-MCNC: 0.4 MG/DL (ref 0.2–1.3)
BUN SERPL-MCNC: 12 MG/DL (ref 7–30)
CALCIUM SERPL-MCNC: 8.9 MG/DL (ref 8.5–10.1)
CHLORIDE SERPL-SCNC: 106 MMOL/L (ref 94–109)
CHOLEST SERPL-MCNC: 216 MG/DL
CO2 SERPL-SCNC: 27 MMOL/L (ref 20–32)
CREAT SERPL-MCNC: 0.98 MG/DL (ref 0.52–1.04)
GFR SERPL CREATININE-BSD FRML MDRD: 68 ML/MIN/{1.73_M2}
GLUCOSE SERPL-MCNC: 106 MG/DL (ref 70–99)
HCV AB SERPL QL IA: NONREACTIVE
HDLC SERPL-MCNC: 52 MG/DL
LDLC SERPL CALC-MCNC: 122 MG/DL
NONHDLC SERPL-MCNC: 164 MG/DL
POTASSIUM SERPL-SCNC: 3.8 MMOL/L (ref 3.4–5.3)
PROT SERPL-MCNC: 7.8 G/DL (ref 6.8–8.8)
SODIUM SERPL-SCNC: 138 MMOL/L (ref 133–144)
TRIGL SERPL-MCNC: 209 MG/DL

## 2021-01-21 PROCEDURE — 77063 BREAST TOMOSYNTHESIS BI: CPT | Mod: TC | Performed by: RADIOLOGY

## 2021-01-21 PROCEDURE — 36415 COLL VENOUS BLD VENIPUNCTURE: CPT | Performed by: INTERNAL MEDICINE

## 2021-01-21 PROCEDURE — 77067 SCR MAMMO BI INCL CAD: CPT | Mod: TC | Performed by: RADIOLOGY

## 2021-01-21 PROCEDURE — 80061 LIPID PANEL: CPT | Performed by: INTERNAL MEDICINE

## 2021-01-21 PROCEDURE — 97110 THERAPEUTIC EXERCISES: CPT | Mod: GP | Performed by: PHYSICAL THERAPIST

## 2021-01-21 PROCEDURE — 86803 HEPATITIS C AB TEST: CPT | Performed by: INTERNAL MEDICINE

## 2021-01-21 PROCEDURE — 97530 THERAPEUTIC ACTIVITIES: CPT | Mod: GP | Performed by: PHYSICAL THERAPIST

## 2021-01-21 PROCEDURE — 80053 COMPREHEN METABOLIC PANEL: CPT | Performed by: INTERNAL MEDICINE

## 2021-01-29 DIAGNOSIS — Z86.79 HISTORY OF VENTRICULAR TACHYCARDIA: ICD-10-CM

## 2021-01-29 RX ORDER — METOPROLOL SUCCINATE 25 MG/1
25 TABLET, EXTENDED RELEASE ORAL DAILY
Qty: 90 TABLET | Refills: 3 | Status: SHIPPED | OUTPATIENT
Start: 2021-01-29 | End: 2022-01-11

## 2021-02-11 ENCOUNTER — THERAPY VISIT (OUTPATIENT)
Dept: PHYSICAL THERAPY | Facility: CLINIC | Age: 51
End: 2021-02-11
Payer: COMMERCIAL

## 2021-02-11 DIAGNOSIS — M54.50 CHRONIC BILATERAL LOW BACK PAIN WITHOUT SCIATICA: ICD-10-CM

## 2021-02-11 DIAGNOSIS — G89.29 CHRONIC BILATERAL LOW BACK PAIN WITHOUT SCIATICA: ICD-10-CM

## 2021-02-11 PROCEDURE — 97530 THERAPEUTIC ACTIVITIES: CPT | Mod: GP | Performed by: PHYSICAL THERAPIST

## 2021-02-11 PROCEDURE — 97110 THERAPEUTIC EXERCISES: CPT | Mod: GP | Performed by: PHYSICAL THERAPIST

## 2021-02-13 NOTE — PROGRESS NOTES
Subjective:  HPI  Physical Exam                    Objective:  System    Physical Exam    General     ROS    Assessment/Plan:    DISCHARGE REPORT    Progress reporting period is from 1-14-21 to 2-11-21.       SUBJECTIVE  Subjective changes noted by patient:   Pt. has made good progress in a short stint of PT. She still has LBP but with much less frequency than before. She has also been able to substantially increase her physical activity level without difficulty. She has resumed almost daily workouts for the first time in a long time without problems. She will continue her HEP with no further PT visits planned unless increased problems arise.      Current pain level is  2/10.     Previous pain level was  4/10.   Changes in function:  Yes (See Goal flowsheet attached for changes in current functional level)  Adverse reaction to treatment or activity: None    OBJECTIVE  Changes noted in objective findings:  ROM lumbar movts WNL. Tests - slump (-); SLR (-). Strength - lower abdominals 4/5; extensors 4/5     ASSESSMENT/PLAN  Updated problem list and treatment plan: Diagnosis 1:  LBP  Pain -  self management and education  Decreased strength - therapeutic exercise and therapeutic activities  Impaired muscle performance - neuro re-education  Decreased function - therapeutic activities  STG/LTGs have been met or progress has been made towards goals:  Yes (See Goal flow sheet completed today.)  Assessment of Progress: Patient is meeting short term goals and is progressing towards long term goals.  Self Management Plans:  Patient is independent in a home treatment program.  Patient is independent in self management of symptoms.  I have re-evaluated this patient and find that the nature, scope, duration and intensity of the therapy is appropriate for the medical condition of the patient.  Xenia continues to require the following intervention to meet STG and LTG's:  PT intervention is no longer required to meet  STG/LTG.    Recommendations:  This patient is ready to be discharged from therapy and continue their home treatment program.    Please refer to the daily flowsheet for treatment today, total treatment time and time spent performing 1:1 timed codes.

## 2021-10-01 ENCOUNTER — HOSPITAL ENCOUNTER (EMERGENCY)
Facility: CLINIC | Age: 51
Discharge: HOME | End: 2021-10-02
Attending: EMERGENCY MEDICINE | Admitting: EMERGENCY MEDICINE
Payer: COMMERCIAL

## 2021-10-01 DIAGNOSIS — N34.2 URETHRITIS: ICD-10-CM

## 2021-10-01 LAB
ALBUMIN UR-MCNC: NEGATIVE MG/DL
APPEARANCE UR: CLEAR
BACTERIA #/AREA URNS HPF: ABNORMAL /HPF
BASOPHILS # BLD AUTO: 0 10E3/UL (ref 0–0.2)
BASOPHILS NFR BLD AUTO: 1 %
BILIRUB UR QL STRIP: NEGATIVE
COLOR UR AUTO: ABNORMAL
EOSINOPHIL # BLD AUTO: 0.1 10E3/UL (ref 0–0.7)
EOSINOPHIL NFR BLD AUTO: 1 %
ERYTHROCYTE [DISTWIDTH] IN BLOOD BY AUTOMATED COUNT: 12.7 % (ref 10–15)
GLUCOSE UR STRIP-MCNC: NEGATIVE MG/DL
HCT VFR BLD AUTO: 37.9 % (ref 35–47)
HGB BLD-MCNC: 12.6 G/DL (ref 11.7–15.7)
HGB UR QL STRIP: NEGATIVE
IMM GRANULOCYTES # BLD: 0 10E3/UL
IMM GRANULOCYTES NFR BLD: 1 %
KETONES UR STRIP-MCNC: NEGATIVE MG/DL
LEUKOCYTE ESTERASE UR QL STRIP: NEGATIVE
LYMPHOCYTES # BLD AUTO: 2.5 10E3/UL (ref 0.8–5.3)
LYMPHOCYTES NFR BLD AUTO: 33 %
MCH RBC QN AUTO: 29.9 PG (ref 26.5–33)
MCHC RBC AUTO-ENTMCNC: 33.2 G/DL (ref 31.5–36.5)
MCV RBC AUTO: 90 FL (ref 78–100)
MONOCYTES # BLD AUTO: 0.5 10E3/UL (ref 0–1.3)
MONOCYTES NFR BLD AUTO: 6 %
NEUTROPHILS # BLD AUTO: 4.6 10E3/UL (ref 1.6–8.3)
NEUTROPHILS NFR BLD AUTO: 58 %
NITRATE UR QL: NEGATIVE
NRBC # BLD AUTO: 0 10E3/UL
NRBC BLD AUTO-RTO: 0 /100
PH UR STRIP: 5.5 [PH] (ref 5–7)
PLATELET # BLD AUTO: 244 10E3/UL (ref 150–450)
RBC # BLD AUTO: 4.21 10E6/UL (ref 3.8–5.2)
RBC URINE: <1 /HPF
SP GR UR STRIP: 1 (ref 1–1.03)
SQUAMOUS EPITHELIAL: 1 /HPF
UROBILINOGEN UR STRIP-MCNC: NORMAL MG/DL
WBC # BLD AUTO: 7.8 10E3/UL (ref 4–11)
WBC URINE: <1 /HPF

## 2021-10-01 PROCEDURE — 87086 URINE CULTURE/COLONY COUNT: CPT | Performed by: EMERGENCY MEDICINE

## 2021-10-01 PROCEDURE — 81001 URINALYSIS AUTO W/SCOPE: CPT | Performed by: EMERGENCY MEDICINE

## 2021-10-01 PROCEDURE — 85004 AUTOMATED DIFF WBC COUNT: CPT | Performed by: EMERGENCY MEDICINE

## 2021-10-01 PROCEDURE — 80048 BASIC METABOLIC PNL TOTAL CA: CPT | Performed by: EMERGENCY MEDICINE

## 2021-10-01 PROCEDURE — 99285 EMERGENCY DEPT VISIT HI MDM: CPT | Mod: 25

## 2021-10-01 PROCEDURE — 36415 COLL VENOUS BLD VENIPUNCTURE: CPT | Performed by: EMERGENCY MEDICINE

## 2021-10-01 RX ORDER — CEPHALEXIN 500 MG/1
500 CAPSULE ORAL ONCE
Status: DISCONTINUED | OUTPATIENT
Start: 2021-10-01 | End: 2021-10-01

## 2021-10-01 RX ORDER — PHENAZOPYRIDINE HYDROCHLORIDE 100 MG/1
100 TABLET, FILM COATED ORAL ONCE
Status: DISCONTINUED | OUTPATIENT
Start: 2021-10-01 | End: 2021-10-01

## 2021-10-01 ASSESSMENT — ENCOUNTER SYMPTOMS
ABDOMINAL PAIN: 1
DYSURIA: 0
DIFFICULTY URINATING: 0

## 2021-10-01 ASSESSMENT — MIFFLIN-ST. JEOR: SCORE: 1480.9

## 2021-10-02 ENCOUNTER — APPOINTMENT (OUTPATIENT)
Dept: CT IMAGING | Facility: CLINIC | Age: 51
End: 2021-10-02
Attending: EMERGENCY MEDICINE
Payer: COMMERCIAL

## 2021-10-02 VITALS
HEART RATE: 75 BPM | HEIGHT: 66 IN | RESPIRATION RATE: 18 BRPM | WEIGHT: 185 LBS | DIASTOLIC BLOOD PRESSURE: 78 MMHG | TEMPERATURE: 98.7 F | OXYGEN SATURATION: 98 % | SYSTOLIC BLOOD PRESSURE: 129 MMHG | BODY MASS INDEX: 29.73 KG/M2

## 2021-10-02 LAB
ANION GAP SERPL CALCULATED.3IONS-SCNC: 7 MMOL/L (ref 3–14)
BUN SERPL-MCNC: 12 MG/DL (ref 7–30)
CALCIUM SERPL-MCNC: 8.3 MG/DL (ref 8.5–10.1)
CHLORIDE BLD-SCNC: 105 MMOL/L (ref 94–109)
CO2 SERPL-SCNC: 24 MMOL/L (ref 20–32)
CREAT SERPL-MCNC: 0.95 MG/DL (ref 0.52–1.04)
GFR SERPL CREATININE-BSD FRML MDRD: 71 ML/MIN/1.73M2
GLUCOSE BLD-MCNC: 97 MG/DL (ref 70–99)
POTASSIUM BLD-SCNC: 3.6 MMOL/L (ref 3.4–5.3)
SODIUM SERPL-SCNC: 136 MMOL/L (ref 133–144)

## 2021-10-02 PROCEDURE — 74177 CT ABD & PELVIS W/CONTRAST: CPT

## 2021-10-02 PROCEDURE — 250N000011 HC RX IP 250 OP 636: Performed by: EMERGENCY MEDICINE

## 2021-10-02 PROCEDURE — 250N000009 HC RX 250: Performed by: EMERGENCY MEDICINE

## 2021-10-02 PROCEDURE — 250N000013 HC RX MED GY IP 250 OP 250 PS 637: Performed by: EMERGENCY MEDICINE

## 2021-10-02 RX ORDER — CEPHALEXIN 500 MG/1
500 CAPSULE ORAL 3 TIMES DAILY
Qty: 20 CAPSULE | Refills: 0 | Status: SHIPPED | OUTPATIENT
Start: 2021-10-02 | End: 2021-10-09

## 2021-10-02 RX ORDER — CEPHALEXIN 500 MG/1
500 CAPSULE ORAL ONCE
Status: COMPLETED | OUTPATIENT
Start: 2021-10-02 | End: 2021-10-02

## 2021-10-02 RX ORDER — PHENAZOPYRIDINE HYDROCHLORIDE 100 MG/1
100 TABLET, FILM COATED ORAL ONCE
Status: COMPLETED | OUTPATIENT
Start: 2021-10-02 | End: 2021-10-02

## 2021-10-02 RX ORDER — IOPAMIDOL 755 MG/ML
93 INJECTION, SOLUTION INTRAVASCULAR ONCE
Status: COMPLETED | OUTPATIENT
Start: 2021-10-02 | End: 2021-10-02

## 2021-10-02 RX ORDER — PHENAZOPYRIDINE HYDROCHLORIDE 100 MG/1
100 TABLET, FILM COATED ORAL 3 TIMES DAILY
Qty: 9 TABLET | Refills: 0 | Status: SHIPPED | OUTPATIENT
Start: 2021-10-02 | End: 2021-10-07

## 2021-10-02 RX ADMIN — CEPHALEXIN 500 MG: 500 CAPSULE ORAL at 01:21

## 2021-10-02 RX ADMIN — IOPAMIDOL 93 ML: 755 INJECTION, SOLUTION INTRAVENOUS at 00:25

## 2021-10-02 RX ADMIN — PHENAZOPYRIDINE HYDROCHLORIDE 100 MG: 100 TABLET ORAL at 01:21

## 2021-10-02 RX ADMIN — SODIUM CHLORIDE 67 ML: 900 INJECTION INTRAVENOUS at 00:26

## 2021-10-02 NOTE — ED TRIAGE NOTES
Patient presents with complaints of bladder pain that began around 14:30 this afternoon. Patient stated that the pain was initially intermittent, now it is constant, she denied other urinary symptoms but says she has been increasingly edgy throughout the evening.

## 2021-10-02 NOTE — ED PROVIDER NOTES
"  History   Chief Complaint:  Lower abdominal pain     HPI   Xenia Snow is a 51 year old female with history of ventricular tachycardia who presents with some midline lower abdominal pain. The patient reports that she was sitting at work today when she suddenly began having intermittent lower abdominal pain. She notes that this pain got stronger and more persistent during the courses of the day. She describes the pain as a muscular type pain that was sharp at times in her urethra. The patient has not had any vaginal discharge, dysuria or difficulty urinating.     Review of Systems   Gastrointestinal: Positive for abdominal pain.   Genitourinary: Negative for difficulty urinating, dysuria and vaginal discharge.   All other systems reviewed and are negative.      Allergies:  Amitiza [Lubiprostone]    Medications:  Claritin   Toprol     Past Medical History:    Ventricular tachycardia   Chronic bilateral back pain       Past Surgical History:    Dilation and curettage   Dilation and evacuation  Appendectomy  Cholecystectomy     Family History:    Mother: hypertension, hyperlipidemia, breast cancer  Father: skin cancer     Social History:  The patient presents with her .     Physical Exam     Patient Vitals for the past 24 hrs:   BP Temp Temp src Pulse Resp SpO2 Height Weight   10/01/21 2118 132/79 98.7  F (37.1  C) Oral 75 (!) 136 99 % 1.676 m (5' 6\") 83.9 kg (185 lb)       Physical Exam  General/Appearance: appears stated age, well-groomed, appears intermittently uncomfortable  Eyes: EOMI, no scleral injection, no icterus  ENT: MMM  Neck: supple, nl ROM, no stiffness  Respiratory: no increased WOB  GI: abd soft, non-distended, suprapubic discomfort to palpation,  no HSM, no rebound, no guarding, nl BS  MSK: PRINCE, good tone, no bony abnormality  Skin: warm and well-perfused, no rash, no edema, no ecchymosis, nl turgor  Neuro: GCS 15, alert and oriented, no gross focal neuro deficits  Psych: interacts " appropriately  Heme: no petechia, no purpura, no active bleeding        Emergency Department Course   Imaging:   CT Abdomen Pelvis W Contrast  1.  No bowel obstruction or inflammation.  2.  Mild fatty infiltration of the liver.  Reading per radiology.    Laboratory:  CBC: WBC 7.8, HGB 12.6,    BMP: Calcium 8.3 (L) o/w WNL (Creatinine 0.95)      UA with microscopic: Bacteria few o/w WNL       Emergency Department Course:    Reviewed:  I reviewed nursing notes, vitals, and past medical history.     Assessments:  2310 I obtained history and examined the patient as noted above.   2329 I rechecked and updated the patient.    0053 I rechecked and updated the patient.      Interventions:  Keflex 500 mg Oral  Pyridium 100 mg Oral     Disposition:  The patient was discharged to home.       Impression & Plan     Medical Decision Making:  This patient is a 51-year-old female who presents with midline suprapubic pain as well as urethral pain.  Pain sounds like it is sharp at times, consistent likely with urethritis.  Urinalysis is unremarkable.  She does have some mild reproducibility to palpation but is not tender in the right lower quadrant, and notes that she is status post appendectomy.  I did consider pathology such as sigmoid diverticulitis, given how tender she was suprapubically, that we did do a CT.  Thankfully this is come back is unremarkable.  I think with this felt is reasonable to treat his urethritis, especially as she endorses a stinging/sharp pain in the urethra.  We will start her on Keflex and Pyridium and send her urine for culture and have her discharged with outpatient follow-up.    Diagnosis:    ICD-10-CM    1. Urethritis  N34.2        Discharge Medications:  New Prescriptions    No medications on file       Scribe Disclosure:  Eagle ZELAYA, am serving as a scribe at 11:06 PM on 10/1/2021 to document services personally performed by Aaliyah Fregoso based on my observations and the  provider's statements to me.              Aaliyah Fregoso MD  10/02/21 0057       Aaliyah Fregoso MD  10/02/21 0131

## 2021-10-03 LAB — BACTERIA UR CULT: NORMAL

## 2021-10-03 NOTE — RESULT ENCOUNTER NOTE
Final urine culture report is negative.  Adult Negative Urine culture parameters per protocol: Any # Urogenital single or mixed organism, <10,000 col/ml single organism (cath specimen), and <50,000 col/ml single organism (midstream).  [facility] Emergency Dept discharge antibiotic prescribed (If applicable): Cephalexin  Treatment recommendations per [facility] ED Lab Result Urine Culture protocol.

## 2021-10-06 ENCOUNTER — OFFICE VISIT (OUTPATIENT)
Dept: INTERNAL MEDICINE | Facility: CLINIC | Age: 51
End: 2021-10-06
Payer: COMMERCIAL

## 2021-10-06 VITALS
HEART RATE: 80 BPM | SYSTOLIC BLOOD PRESSURE: 130 MMHG | DIASTOLIC BLOOD PRESSURE: 76 MMHG | WEIGHT: 195.8 LBS | BODY MASS INDEX: 31.6 KG/M2 | RESPIRATION RATE: 18 BRPM | TEMPERATURE: 98.2 F | OXYGEN SATURATION: 99 %

## 2021-10-06 DIAGNOSIS — N89.8 VAGINAL DISCHARGE: Primary | ICD-10-CM

## 2021-10-06 DIAGNOSIS — B37.31 YEAST VAGINITIS: ICD-10-CM

## 2021-10-06 DIAGNOSIS — R10.2 PELVIC PAIN IN FEMALE: ICD-10-CM

## 2021-10-06 LAB
CLUE CELLS: ABNORMAL
TRICHOMONAS, WET PREP: ABNORMAL
WBC'S/HIGH POWER FIELD, WET PREP: ABNORMAL
YEAST, WET PREP: PRESENT

## 2021-10-06 PROCEDURE — 99213 OFFICE O/P EST LOW 20 MIN: CPT | Performed by: PHYSICIAN ASSISTANT

## 2021-10-06 PROCEDURE — 87210 SMEAR WET MOUNT SALINE/INK: CPT | Performed by: PHYSICIAN ASSISTANT

## 2021-10-06 RX ORDER — FLUCONAZOLE 150 MG/1
150 TABLET ORAL ONCE
Qty: 2 TABLET | Refills: 0 | Status: SHIPPED | OUTPATIENT
Start: 2021-10-06 | End: 2021-10-06

## 2021-10-06 NOTE — PROGRESS NOTES
"    Assessment & Plan     Vaginal discharge  Currently on abx for urethritis  Will notify later today of results   - Wet prep - Clinic Collect    Pelvic pain in female  Has appt with gyn provider tomorrow. - this was to discuss perimenopausal symptoms.  Patient with odd episode of pelvic pain that radiates to the perineum   No rash and bilateral symptoms              BMI:   Estimated body mass index is 31.6 kg/m  as calculated from the following:    Height as of 10/1/21: 1.676 m (5' 6\").    Weight as of this encounter: 88.8 kg (195 lb 12.8 oz).   Weight management plan: Patient was referred to their PCP to discuss a diet and exercise plan.        Return in about 6 months (around 4/6/2022) for Routine Visit, regular primary provider.    JULISA Garsia [facility] SPIKE Michaels is a 51 year old who presents for the following health issues     HPI     ED/UC Followup:    Facility:  [facility]  Date of visit: 10/01/2021  Reason for visit: Urethritis  Current Status: Parts are better Abdominal pain or spasms are better, parts are worse are vaginal area is tender and feels swollen       Vaginal Symptoms  Onset/Duration: one week  Description:  Vaginal Discharge: just over the past day or so with white/ and slight itching   Itching (Pruritis): no  Burning sensation:  YES- painful /feels tender and swollen in the  area   Odor: no  Accompanying Signs & Symptoms:  Urinary symptoms: no  Abdominal pain: YES- lower pelvic pain though better than at ED visit   On pyridium   Fever: no  History:   Sexually active: no-  but not having sex lately   New Partner: no  Possibility of Pregnancy:  no  Recent antibiotic use: YES  Previous vaginitis issues: no  Precipitating or alleviating factors: None  Therapies tried and outcome: advil and medications from the ED         Review of Systems   Constitutional, HEENT, cardiovascular, pulmonary, gi and gu systems are " negative, except as otherwise noted.      Objective    /76   Pulse 80   Temp 98.2  F (36.8  C) (Tympanic)   Resp 18   Wt 88.8 kg (195 lb 12.8 oz)   LMP 09/26/2021 (Approximate)   SpO2 99%   BMI 31.60 kg/m    Body mass index is 31.6 kg/m .  Physical Exam   GENERAL: healthy, alert and no distress  ABDOMEN: soft, nontender, no hepatosplenomegaly, no masses and bowel sounds normal   (female): normal female external genitalia, normal urethral meatus , vaginal mucosa pink, moist, well rugated and vaginal discharge - white  SKIN: no suspicious lesions or rashes  NEURO: Normal strength and tone, mentation intact and speech normal

## 2021-10-07 ENCOUNTER — OFFICE VISIT (OUTPATIENT)
Dept: OBGYN | Facility: CLINIC | Age: 51
End: 2021-10-07
Payer: COMMERCIAL

## 2021-10-07 VITALS
SYSTOLIC BLOOD PRESSURE: 126 MMHG | BODY MASS INDEX: 30.86 KG/M2 | HEIGHT: 66 IN | WEIGHT: 192 LBS | DIASTOLIC BLOOD PRESSURE: 78 MMHG

## 2021-10-07 DIAGNOSIS — R37 SEXUAL DYSFUNCTION: Primary | ICD-10-CM

## 2021-10-07 PROCEDURE — 99213 OFFICE O/P EST LOW 20 MIN: CPT | Performed by: OBSTETRICS & GYNECOLOGY

## 2021-10-07 ASSESSMENT — MIFFLIN-ST. JEOR: SCORE: 1512.66

## 2021-10-10 NOTE — PATIENT INSTRUCTIONS
You can reach your [facility] Care Team any time of the day by calling [phone]. This number will put you in touch with the 24 hour nurse line if the clinic is closed.    To contact your OB/GYN Surgery Scheduler please call [phone]. This is a direct number for your care team between 8 a.m. and 4 p.m. Monday through Friday.    [facility] is open for your convenience: [phone]  Monday through Friday 8 a.m. to 8:30 p.m.  Saturday 9 a.m. to 6 p.m.  Sunday Noon to 6 p.m.    They are closed on all major holidays.

## 2021-10-10 NOTE — PROGRESS NOTES
"HPI:  Xenia Snow is a 51 year old female  Patient's last menstrual period was 2021 (exact date).  Vasectomy contraception, who presents for evaluation of sexuality concerns and concerns of potential early menopause.  Patient states that she has monthly menses without unscheduled bleeding or excessive flow or discomfort.  On further questioning the patient states that she and her  had their 25th wedding anniversary early in September.  They went away for a romantic weekend did have intercourse daily for 3 days but she felt that sexual relations were a \"struggle \"she states that a few years ago they would have relations 2-3 times a week and in the last year or 2 sexual activity has become less frequent.  She states that she and her  actually have separate bedrooms.  She states that the relationship with strong.  Her  is 52 years old and has no erectile dysfunction.  She states that with intercourse it takes her  a long period of time to have an orgasm and in the interval she becomes dry and develops pain.  She denies symptoms to suggest.  Vulvovaginitis although on 10/6/2021 the patient was the diagnosed with monilia and placed on Diflucan.  Patient states that she enjoys sexual activity and while her libido is perhaps less than it was when she was younger she still feels this is an important part of their relationship.  Patient is able to have orgasms both with her  on her own.  We had a lengthy discussion today regarding relationship issues sexuality, menopause in late reproductive year changes dryness and different methodologies that could be employed to aid in her  achieving an orgasm  and hence minimizing pain.  We discussed appropriate vaginal lubrication that could also be used in foreplay techniques that also could be helpful    Past Medical History:   Diagnosis Date     History of ventricular tachycardia     on Toprol     Impaired fasting " glucose      Past Surgical History:   Procedure Laterality Date     DILATION AND CURETTAGE       DILATION AND EVACUATION       LAPAROSCOPIC APPENDECTOMY  2018     LAPAROSCOPIC CHOLECYSTECTOMY  2017     Family History   Problem Relation Age of Onset     Hypertension Mother      Hyperlipidemia Mother      Breast Cancer Mother         post-menopausal     Skin Cancer Father         unknown types     Coronary Artery Disease Maternal Grandmother         s/p CABG later in life     Myocardial Infarction Maternal Grandfather         later in life     Breast Cancer Paternal Grandmother         post-menopausal     Myocardial Infarction Maternal Uncle         later in life     Rheumatoid Arthritis Paternal Aunt      Diabetes No family hx of      Coronary Artery Disease Early Onset No family hx of      Cerebrovascular Disease No family hx of      Ovarian Cancer No family hx of      Colon Cancer No family hx of      Social History     Socioeconomic History     Marital status:      Spouse name: Not on file     Number of children: Not on file     Years of education: Not on file     Highest education level: Not on file   Occupational History     Occupation: Consulting -    Tobacco Use     Smoking status: Never Smoker     Smokeless tobacco: Never Used   Substance and Sexual Activity     Alcohol use: Never     Drug use: Never     Sexual activity: Yes     Partners: Female     Birth control/protection: Male Surgical   Other Topics Concern     Not on file   Social History Narrative    .    4 kids (19, 18, 16, and 13 as of 2020).    Exercising regularly.      Social Determinants of Health     Financial Resource Strain:      Difficulty of Paying Living Expenses:    Food Insecurity:      Worried About Running Out of Food in the Last Year:      Ran Out of Food in the Last Year:    Transportation Needs:      Lack of Transportation (Medical):      Lack of Transportation (Non-Medical):    Physical Activity:       "Days of Exercise per Week:      Minutes of Exercise per Session:    Stress:      Feeling of Stress :    Social Connections:      Frequency of Communication with Friends and Family:      Frequency of Social Gatherings with Friends and Family:      Attends [employer] Services:      Active Member of Clubs or Organizations:      Attends Club or Organization Meetings:      Marital Status:    Intimate Partner Violence:      Fear of Current or Ex-Partner:      Emotionally Abused:      Physically Abused:      Sexually Abused:        Allergies:  Amitiza [lubiprostone]    Current Outpatient Medications   Medication Sig Dispense Refill     loratadine (CLARITIN) 10 MG tablet Take 1 tablet (10 mg) by mouth daily 90 tablet 3     metoprolol succinate ER (TOPROL-XL) 25 MG 24 hr tablet Take 1 tablet (25 mg) by mouth daily 90 tablet 3       Review Of Systems   ROS: 10 point ROS neg other than the symptoms noted above in the HPI.    Exam:  /78 (BP Location: Right arm, Patient Position: Chair, Cuff Size: Adult Regular)   Ht 1.676 m (5' 6\")   Wt 87.1 kg (192 lb)   LMP 09/26/2021 (Exact Date)   Breastfeeding No   BMI 30.99 kg/m    {Constitutional: healthy, alert and no distress    (R37) Sexual dysfunction  (primary encounter diagnosis)  Comment: The patient states that she and her  do have a strong relationship because of sleep issues had been in separate bedrooms but plan on moving back into the same bedroom.  We discussed items that could be done to maximize their interpersonal relationship and personal parts of their lives.  I do not see evidence of serious pathology at this time.  We discussed the previously diagnosed monilial vaginitis and its effect on relations  Plan: The patient will try these suggestions and let me know if there is no improvement          Mic Carmichael M.D.           "

## 2021-10-30 DIAGNOSIS — Z86.79 HISTORY OF VENTRICULAR TACHYCARDIA: ICD-10-CM

## 2021-11-01 RX ORDER — METOPROLOL SUCCINATE 25 MG/1
TABLET, EXTENDED RELEASE ORAL
Qty: 90 TABLET | Refills: 3 | OUTPATIENT
Start: 2021-11-01

## 2021-11-16 PROBLEM — G89.29 CHRONIC BILATERAL LOW BACK PAIN WITHOUT SCIATICA: Status: RESOLVED | Noted: 2021-01-14 | Resolved: 2021-11-16

## 2021-11-16 PROBLEM — M54.50 CHRONIC BILATERAL LOW BACK PAIN WITHOUT SCIATICA: Status: RESOLVED | Noted: 2021-01-14 | Resolved: 2021-11-16

## 2022-01-11 ENCOUNTER — OFFICE VISIT (OUTPATIENT)
Dept: INTERNAL MEDICINE | Facility: CLINIC | Age: 52
End: 2022-01-11
Payer: COMMERCIAL

## 2022-01-11 VITALS
RESPIRATION RATE: 16 BRPM | HEIGHT: 66 IN | SYSTOLIC BLOOD PRESSURE: 122 MMHG | HEART RATE: 91 BPM | OXYGEN SATURATION: 97 % | WEIGHT: 200 LBS | DIASTOLIC BLOOD PRESSURE: 70 MMHG | BODY MASS INDEX: 32.14 KG/M2 | TEMPERATURE: 98.6 F

## 2022-01-11 DIAGNOSIS — R73.01 IMPAIRED FASTING GLUCOSE: ICD-10-CM

## 2022-01-11 DIAGNOSIS — Z13.220 SCREENING FOR CHOLESTEROL LEVEL: ICD-10-CM

## 2022-01-11 DIAGNOSIS — Z86.79 HISTORY OF VENTRICULAR TACHYCARDIA: ICD-10-CM

## 2022-01-11 DIAGNOSIS — Z23 HIGH PRIORITY FOR 2019-NCOV VACCINE: ICD-10-CM

## 2022-01-11 DIAGNOSIS — Z23 NEED FOR PROPHYLACTIC VACCINATION AND INOCULATION AGAINST INFLUENZA: ICD-10-CM

## 2022-01-11 DIAGNOSIS — J45.20 MILD INTERMITTENT REACTIVE AIRWAY DISEASE WITHOUT COMPLICATION: ICD-10-CM

## 2022-01-11 DIAGNOSIS — Z13.1 SCREENING FOR DIABETES MELLITUS: ICD-10-CM

## 2022-01-11 DIAGNOSIS — Z12.31 ENCOUNTER FOR SCREENING MAMMOGRAM FOR BREAST CANCER: ICD-10-CM

## 2022-01-11 DIAGNOSIS — Z00.00 ROUTINE HISTORY AND PHYSICAL EXAMINATION OF ADULT: Primary | ICD-10-CM

## 2022-01-11 PROBLEM — E66.9 OBESITY (BMI 30-39.9): Status: ACTIVE | Noted: 2022-01-11

## 2022-01-11 PROCEDURE — 99396 PREV VISIT EST AGE 40-64: CPT | Mod: 25 | Performed by: INTERNAL MEDICINE

## 2022-01-11 PROCEDURE — 91300 COVID-19,PF,PFIZER (12+ YRS): CPT | Performed by: INTERNAL MEDICINE

## 2022-01-11 PROCEDURE — 90686 IIV4 VACC NO PRSV 0.5 ML IM: CPT | Performed by: INTERNAL MEDICINE

## 2022-01-11 PROCEDURE — 90471 IMMUNIZATION ADMIN: CPT | Performed by: INTERNAL MEDICINE

## 2022-01-11 PROCEDURE — 0004A COVID-19,PF,PFIZER (12+ YRS): CPT | Performed by: INTERNAL MEDICINE

## 2022-01-11 RX ORDER — ALBUTEROL SULFATE 90 UG/1
2 AEROSOL, METERED RESPIRATORY (INHALATION) EVERY 6 HOURS
Qty: 18 G | Refills: 1 | Status: SHIPPED | OUTPATIENT
Start: 2022-01-11 | End: 2022-06-27

## 2022-01-11 RX ORDER — METOPROLOL SUCCINATE 25 MG/1
25 TABLET, EXTENDED RELEASE ORAL DAILY
Qty: 90 TABLET | Refills: 3 | Status: SHIPPED | OUTPATIENT
Start: 2022-01-11 | End: 2023-01-20

## 2022-01-11 ASSESSMENT — MIFFLIN-ST. JEOR: SCORE: 1548.94

## 2022-01-11 NOTE — PROGRESS NOTES
ASSESSMENT/PLAN                                                       (Z00.00) Routine history and physical examination of adult  (primary encounter diagnosis)  Comment: PMH, PSH, FH, SH, medications, allergies, immunizations, and preventative health measures reviewed and updated as appropriate.  Plan: see below for plans.      (Z23) Need for prophylactic vaccination and inoculation against influenza  Plan: flu shot given today.     (Z23) High priority for 2019-nCoV vaccine  Plan: COVID-19 booster given today.    (Z12.31) Encounter for screening mammogram for breast cancer  Plan: screening mammogram ordered - patient to schedule.     (R73.01) Impaired fasting glucose  (Z13.220) Screening for cholesterol level  (Z13.1) Screening for diabetes mellitus  Plan: fasting labs ordered - patient to schedule.     (Z86.79) History of ventricular tachycardia  Comment: well-controlled on current regimen.    Plan: continue present management; refills provided.     (J45.20) Mild intermittent reactive airway disease without complication  Comment: well-controlled on current regimen.    Plan: continue present management; refills provided.     Carol Yung MD   [address] [address]  T: [phone], F: [phone]    SUBJECTIVE                                                      Xenia Snow is a very pleasant 51 year old female who presents for a physical.    ROS:  Constitutional: no unintentional weight loss or gain reported; no fevers, chills, or sweats reported  Cardiovascular: no chest pain, palpitations, or edema reported  Respiratory: no cough, wheezing, shortness of breath, or dyspnea on exertion reported  Gastrointestinal: no nausea, vomiting, constipation, diarrhea, or abdominal pain reported  Genitourinary: no urinary frequency, urgency, dysuria, or hematuria reported  Integumentary: no rash or pruritus reported  Musculoskeletal: no back pain, muscle pain, joint  pain, or joint swelling reported  Neurologic: no focal weakness, numbness, or tingling reported  Hematologic: no easy bruising or bleeding reported  Endocrine: no heat or cold intolerance reported; no polyuria or polydipsia reported  Psychiatric: no anxiety or depression reported    Past Medical History:   Diagnosis Date     History of ventricular tachycardia     on Toprol     Impaired fasting glucose      Obesity (BMI 30-39.9)      Past Surgical History:   Procedure Laterality Date     DILATION AND CURETTAGE       DILATION AND EVACUATION       LAPAROSCOPIC APPENDECTOMY  2018     LAPAROSCOPIC CHOLECYSTECTOMY  2017     Family History   Problem Relation Age of Onset     Hypertension Mother      Hyperlipidemia Mother      Breast Cancer Mother         post-menopausal     Skin Cancer Father         unknown types     Pacemaker Father      Valvular heart disease Father      Coronary Artery Disease Maternal Grandmother         s/p CABG later in life     Myocardial Infarction Maternal Grandfather         later in life     Breast Cancer Paternal Grandmother         post-menopausal     Myocardial Infarction Maternal Uncle         later in life     Rheumatoid Arthritis Paternal Aunt      Diabetes No family hx of      Coronary Artery Disease Early Onset No family hx of      Cerebrovascular Disease No family hx of      Ovarian Cancer No family hx of      Colon Cancer No family hx of      Social History     Occupational History     Occupation: Consulting -    Tobacco Use     Smoking status: Never Smoker     Smokeless tobacco: Never Used   Substance and Sexual Activity     Alcohol use: Never     Drug use: Never     Sexual activity: Yes     Partners: Female     Birth control/protection: Male Surgical   Social History Narrative    .    4 kids (2 adult, 2 high school).    Exercising sometimes.     Allergies   Allergen Reactions     Amitiza [Lubiprostone] GI Disturbance     Current Outpatient Medications  "  Medication Sig     albuterol (PROAIR HFA/PROVENTIL HFA/VENTOLIN HFA) 108 (90 Base) MCG/ACT inhaler Inhale 2 puffs into the lungs every 6 hours     loratadine (CLARITIN) 10 MG tablet Take 1 tablet (10 mg) by mouth daily     metoprolol succinate ER (TOPROL-XL) 25 MG 24 hr tablet Take 1 tablet (25 mg) by mouth daily     Immunization History   Administered Date(s) Administered     COVID-19,PF,Pfizer (12+ Yrs) 04/20/2021, 05/18/2021, 01/11/2022     Influenza Vaccine IM > 6 months Valent IIV4 (Alfuria,Fluzone) 12/13/2019, 10/30/2020, 01/11/2022     TDAP Vaccine (Adacel) 08/03/2019     PREVENTATIVE HEALTH                                                      BMI: obese  Blood pressure: within normal limits   Breast CA screening: DUE  Cervical CA screening: up to date   Colon CA screening: not medically indicated at this time   Lung CA screening: n/a   Dexa: not medically indicated at this time   Screening cholesterol: DUE  Screening diabetes: DUE  STD testing: no risk factors present  Alcohol misuse screening: alcohol use reviewed - no intervention indicated at this time  Immunizations: reviewed; flu shot and COVID-19 booster DUE    OBJECTIVE                                                      /70 (BP Location: Left arm, Patient Position: Chair, Cuff Size: Adult Regular)   Pulse 91   Temp 98.6  F (37  C) (Temporal)   Resp 16   Ht 1.676 m (5' 6\")   Wt 90.7 kg (200 lb)   SpO2 97%   BMI 32.28 kg/m    Constitutional: well-appearing  Head, Ears, and Eyes: normocephalic; normal external auditory canal and pinna; tympanic membranes visualized and normal; normal lids and conjunctivae  Neck: supple, symmetric, no thyromegaly or lymphadenopathy  Respiratory: normal respiratory effort; clear to auscultation bilaterally  Cardiovascular: regular rate and rhythm; no edema  Gastrointestinal: soft, non-tender, and non-distended; no organomegaly or masses  Musculoskeletal: normal gait and station  Psych: normal judgment and " insight; normal mood and affect; recent and remote memory intact    ---  (Note was completed, in part, with [employer] voice-recognition software. Documentation was reviewed, but some grammatical, spelling, and word errors may remain.)

## 2022-01-25 ENCOUNTER — ANCILLARY PROCEDURE (OUTPATIENT)
Dept: MAMMOGRAPHY | Facility: CLINIC | Age: 52
End: 2022-01-25
Attending: INTERNAL MEDICINE
Payer: COMMERCIAL

## 2022-01-25 ENCOUNTER — LAB (OUTPATIENT)
Dept: LAB | Facility: CLINIC | Age: 52
End: 2022-01-25

## 2022-01-25 DIAGNOSIS — Z12.31 VISIT FOR SCREENING MAMMOGRAM: ICD-10-CM

## 2022-01-25 DIAGNOSIS — R73.01 IMPAIRED FASTING GLUCOSE: ICD-10-CM

## 2022-01-25 DIAGNOSIS — Z12.31 ENCOUNTER FOR SCREENING MAMMOGRAM FOR BREAST CANCER: ICD-10-CM

## 2022-01-25 DIAGNOSIS — Z13.1 SCREENING FOR DIABETES MELLITUS: ICD-10-CM

## 2022-01-25 DIAGNOSIS — Z13.220 SCREENING FOR CHOLESTEROL LEVEL: ICD-10-CM

## 2022-01-25 PROBLEM — E78.1 HYPERTRIGLYCERIDEMIA: Status: ACTIVE | Noted: 2022-01-25

## 2022-01-25 LAB
ALBUMIN SERPL-MCNC: 3.5 G/DL (ref 3.4–5)
ALP SERPL-CCNC: 63 U/L (ref 40–150)
ALT SERPL W P-5'-P-CCNC: 27 U/L (ref 0–50)
ANION GAP SERPL CALCULATED.3IONS-SCNC: 5 MMOL/L (ref 3–14)
AST SERPL W P-5'-P-CCNC: 18 U/L (ref 0–45)
BILIRUB SERPL-MCNC: 0.2 MG/DL (ref 0.2–1.3)
BUN SERPL-MCNC: 12 MG/DL (ref 7–30)
CALCIUM SERPL-MCNC: 8.7 MG/DL (ref 8.5–10.1)
CHLORIDE BLD-SCNC: 108 MMOL/L (ref 94–109)
CHOLEST SERPL-MCNC: 183 MG/DL
CO2 SERPL-SCNC: 25 MMOL/L (ref 20–32)
CREAT SERPL-MCNC: 0.93 MG/DL (ref 0.52–1.04)
FASTING STATUS PATIENT QL REPORTED: YES
GFR SERPL CREATININE-BSD FRML MDRD: 75 ML/MIN/1.73M2
GLUCOSE BLD-MCNC: 112 MG/DL (ref 70–99)
HBA1C MFR BLD: 5.6 % (ref 0–5.6)
HDLC SERPL-MCNC: 30 MG/DL
LDLC SERPL CALC-MCNC: ABNORMAL MG/DL
NONHDLC SERPL-MCNC: 153 MG/DL
POTASSIUM BLD-SCNC: 4.3 MMOL/L (ref 3.4–5.3)
PROT SERPL-MCNC: 7.3 G/DL (ref 6.8–8.8)
SODIUM SERPL-SCNC: 138 MMOL/L (ref 133–144)
TRIGL SERPL-MCNC: 516 MG/DL

## 2022-01-25 PROCEDURE — 83036 HEMOGLOBIN GLYCOSYLATED A1C: CPT

## 2022-01-25 PROCEDURE — 77067 SCR MAMMO BI INCL CAD: CPT | Mod: TC | Performed by: RADIOLOGY

## 2022-01-25 PROCEDURE — 36415 COLL VENOUS BLD VENIPUNCTURE: CPT

## 2022-01-25 PROCEDURE — 77063 BREAST TOMOSYNTHESIS BI: CPT | Mod: TC | Performed by: RADIOLOGY

## 2022-01-25 PROCEDURE — 80053 COMPREHEN METABOLIC PANEL: CPT

## 2022-01-25 PROCEDURE — 80061 LIPID PANEL: CPT

## 2022-03-21 ENCOUNTER — ANCILLARY PROCEDURE (OUTPATIENT)
Dept: GENERAL RADIOLOGY | Facility: CLINIC | Age: 52
End: 2022-03-21
Attending: PHYSICIAN ASSISTANT
Payer: COMMERCIAL

## 2022-03-21 ENCOUNTER — OFFICE VISIT (OUTPATIENT)
Dept: URGENT CARE | Facility: URGENT CARE | Age: 52
End: 2022-03-21
Payer: COMMERCIAL

## 2022-03-21 ENCOUNTER — NURSE TRIAGE (OUTPATIENT)
Dept: NURSING | Facility: CLINIC | Age: 52
End: 2022-03-21
Payer: COMMERCIAL

## 2022-03-21 VITALS
RESPIRATION RATE: 16 BRPM | DIASTOLIC BLOOD PRESSURE: 90 MMHG | TEMPERATURE: 98.4 F | HEART RATE: 97 BPM | WEIGHT: 200 LBS | SYSTOLIC BLOOD PRESSURE: 120 MMHG | BODY MASS INDEX: 32.28 KG/M2 | OXYGEN SATURATION: 96 %

## 2022-03-21 DIAGNOSIS — R05.9 COUGH: ICD-10-CM

## 2022-03-21 DIAGNOSIS — J20.9 ACUTE BRONCHITIS, UNSPECIFIED ORGANISM: Primary | ICD-10-CM

## 2022-03-21 PROCEDURE — 71046 X-RAY EXAM CHEST 2 VIEWS: CPT | Performed by: RADIOLOGY

## 2022-03-21 PROCEDURE — 99214 OFFICE O/P EST MOD 30 MIN: CPT | Performed by: PHYSICIAN ASSISTANT

## 2022-03-21 RX ORDER — PREDNISONE 20 MG/1
40 TABLET ORAL DAILY
Qty: 10 TABLET | Refills: 0 | Status: SHIPPED | OUTPATIENT
Start: 2022-03-21 | End: 2022-03-26

## 2022-03-21 RX ORDER — ALBUTEROL SULFATE 90 UG/1
1-2 AEROSOL, METERED RESPIRATORY (INHALATION) EVERY 6 HOURS
Qty: 8.5 G | Refills: 0 | Status: SHIPPED | OUTPATIENT
Start: 2022-03-21 | End: 2022-03-26

## 2022-03-21 NOTE — TELEPHONE ENCOUNTER
Patient has had a cold for 2 weeks.  Since Thursday it has been really bad.  She has been on the  and not able to eat.  Patient took a covid test and it was negative.    Patient denies a fever.  Patient states when she coughs she has yellowish/tanish stuff come out.  She states she can hear some wheezing when she breaths.  Patient also states some tightness and sharpness in her chest.    Care advise: go to ED for evaluation.  Patient states she would prefer to go to [facility] to see if they can help.  Patient will plan to go there now.    Yola Howard RN   03/21/22 6:33 PM  [facility] Nurse Advisor      Reason for Disposition    Chest pain  (Exception: MILD central chest pain, present only when coughing)    Additional Information    Negative: Severe difficulty breathing (e.g., struggling for each breath, speaks in single words)    Negative: Bluish (or gray) lips or face now    Negative: [1] Difficulty breathing AND [2] exposure to flames, smoke, or fumes    Negative: [1] Stridor AND [2] difficulty breathing    Negative: Sounds like a life-threatening emergency to the triager    Negative: [1] Previous asthma attacks AND [2] this feels like asthma attack    Negative: Dry (non-productive) cough (i.e., no sputum or minimal clear sputum)    Protocols used: COUGH - ACUTE [id number]

## 2022-03-21 NOTE — LETTER
[facility]  [address] [address]  [phone]      March 21, 2022    RE:  Xenia LUNA Gwendolyn                                                                                                                                                       [address] [place] [address]            To whom it may concern:    Xenia Cherryaffrey was seen in clinic. I recommend resting for the next 5 days.        Sincerely,        JULISA Tapia [facility]

## 2022-03-22 NOTE — PROGRESS NOTES
Es   URGENT CARE VISIT:    SUBJECTIVE:  Xenia Snow is a 51 year old female who presents with a  productive cough and stuffy nose for 2 week(s). Symptoms are moderate and worsening. She denies fever and sore throat. She has tried OTC cough suppressants with no relief of symptoms. Symptoms are exacerbated by no particular triggers. Recent sick contacts include none. She denies a history of asthma. Patient denies history of smoking.    PMH:   Past Medical History:   Diagnosis Date     History of ventricular tachycardia     on Toprol     Hypertriglyceridemia      Impaired fasting glucose      Obesity (BMI 30-39.9)      Allergies: Amitiza [lubiprostone]  Medications:   Current Outpatient Medications   Medication Sig Dispense Refill     albuterol (PROAIR HFA/PROVENTIL HFA/VENTOLIN HFA) 108 (90 Base) MCG/ACT inhaler Inhale 1-2 puffs into the lungs every 6 hours for 5 days 8.5 g 0     albuterol (PROAIR HFA/PROVENTIL HFA/VENTOLIN HFA) 108 (90 Base) MCG/ACT inhaler Inhale 2 puffs into the lungs every 6 hours 18 g 1     loratadine (CLARITIN) 10 MG tablet Take 1 tablet (10 mg) by mouth daily 90 tablet 3     metoprolol succinate ER (TOPROL-XL) 25 MG 24 hr tablet Take 1 tablet (25 mg) by mouth daily 90 tablet 3     predniSONE (DELTASONE) 20 MG tablet Take 2 tablets (40 mg) by mouth daily for 5 days 10 tablet 0     Social History:   Social History     Tobacco Use     Smoking status: Never Smoker     Smokeless tobacco: Never Used   Substance Use Topics     Alcohol use: Never       ROS: ROS otherwise found to be negative except as noted above.    OBJECTIVE:  BP (!) 120/90   Pulse 97   Temp 98.4  F (36.9  C) (Tympanic)   Resp 16   Wt 90.7 kg (200 lb)   SpO2 96%   BMI 32.28 kg/m    General: WDWN in NAD.   Eyes: Non-injected conjunctivas without drainage bilaterally.  Nose: Clear rhinorrhea.  Ears: Bilateral TMs without erythema, injection, or effusion. No erythema or edema of external canals.   Oropharynx: Mildly  erythematous oropharynx. No posterior pharyngeal erythema or exudate.  No oral lesions.  Uvula is midline without erythema or edema.  Cardiac: RRR without murmurs, rubs, or gallops.  Respiratory: LCTAB without adventitious sounds. Non-labored breathing.  Lymph nodes: no cervical adenopathy.    Imaging:  Results for orders placed or performed in visit on 03/21/22   XR Chest 2 Views     Status: None    Narrative    EXAM: XR CHEST 2 VW  LOCATION: [facility]  DATE/TIME: 3/21/2022 7:17 PM    INDICATION:  Cough  COMPARISON: None.      Impression    IMPRESSION: Negative chest.       ASSESSMENT:    ICD-10-CM    1. Acute bronchitis, unspecified organism  J20.9 XR Chest 2 Views     albuterol (PROAIR HFA/PROVENTIL HFA/VENTOLIN HFA) 108 (90 Base) MCG/ACT inhaler     predniSONE (DELTASONE) 20 MG tablet        PLAN:  30 minutes spent on the date of the encounter doing chart review, review of outside records, review of test results, interpretation of tests, patient visit and documentation   Patient Instructions   Patient was educated on the natural course of viral condition. Chest x-ray was negative for pneumonia. Take medications as directed. Side effects discussed. Conservative measures discussed including rest, increased fluids, humidifier, and over-the-counter cough suppressants. See your primary care provider if symptoms do not improve in 5 days. Seek emergency care if you develop shortness of breath or fever over 103.     Patient verbalized understanding and is agreeable to plan. The patient was discharged ambulatory and in stable condition.    Sabrina Verma PA-C ....................  3/21/2022   7:46 PM

## 2022-03-22 NOTE — PATIENT INSTRUCTIONS
Patient was educated on the natural course of viral condition. Chest x-ray was negative for pneumonia. Take medications as directed. Side effects discussed. Conservative measures discussed including rest, increased fluids, humidifier, and over-the-counter cough suppressants. See your primary care provider if symptoms do not improve in 5 days. Seek emergency care if you develop shortness of breath or fever over 103.

## 2022-03-28 ENCOUNTER — OFFICE VISIT (OUTPATIENT)
Dept: INTERNAL MEDICINE | Facility: CLINIC | Age: 52
End: 2022-03-28
Payer: COMMERCIAL

## 2022-03-28 VITALS
TEMPERATURE: 96.8 F | BODY MASS INDEX: 32.28 KG/M2 | OXYGEN SATURATION: 99 % | SYSTOLIC BLOOD PRESSURE: 132 MMHG | DIASTOLIC BLOOD PRESSURE: 80 MMHG | HEART RATE: 82 BPM | RESPIRATION RATE: 22 BRPM | WEIGHT: 200 LBS

## 2022-03-28 DIAGNOSIS — R05.9 COUGH: Primary | ICD-10-CM

## 2022-03-28 PROCEDURE — 99213 OFFICE O/P EST LOW 20 MIN: CPT | Performed by: INTERNAL MEDICINE

## 2022-03-28 RX ORDER — GUAIFENESIN/DEXTROMETHORPHAN 100-10MG/5
10 SYRUP ORAL EVERY 4 HOURS PRN
Qty: 236 ML | Refills: 1 | Status: SHIPPED | OUTPATIENT
Start: 2022-03-28 | End: 2022-07-13

## 2022-03-28 RX ORDER — PREDNISONE 20 MG/1
TABLET ORAL
Qty: 11 TABLET | Refills: 0 | Status: SHIPPED | OUTPATIENT
Start: 2022-03-28 | End: 2022-04-07

## 2022-03-28 NOTE — LETTER
03/28/22      Xenia Snow  1970  [address] [place] [address]        To whom it may concern,    Please excuse Ms. Snow from work 3/21/22-3/30/22. She will be needing time to rest and recuperate from an illness that I am seeing her for. She may return to work 3/31/22 without restrictions.    Please contact me with any question or concerns.        Carol Yung MD   [place] [address] [place] [address]  T: [phone], F: [phone]

## 2022-03-28 NOTE — PROGRESS NOTES
ASSESSMENT/PLAN                                                      (R05.9) Cough  (primary encounter diagnosis)  Plan: oral steroid taper prescribed; guaifenesin-dextromethorphan as needed for cough; if symptoms worsen, change, or do not improve, patient to contact MD; letter provided for work.    Carol Yung MD   [place] [address] [place] [address]  T: [phone], F: [phone]    SUBJECTIVE                                                      Xenia Snow is a very pleasant 51 year old female who presents for follow-up:    Patient was seen in urgent care last week with a productive cough and stuffy nose. Negative home Covid test. CXR negative. Diagnosed with bronchitis and prescribed an oral steroid burst and an albuterol inhaler. Patient reports that she is feeling much better, but continues to have a productive cough, ear crackling, and excessive fatigue. No fevers or chills. No sinus congestion, pressure, or pain. No wheezing or shortness of breath. No chest pain, tightness, or palpitations.    OBJECTIVE                                                      /80 (BP Location: Left arm, Patient Position: Chair, Cuff Size: Adult Large)   Pulse 82   Temp 96.8  F (36  C) (Temporal)   Resp 22   Wt 90.7 kg (200 lb)   SpO2 99%   BMI 32.28 kg/m    Constitutional: well-appearing  Head, Ears, Nose, and Mouth: normocephalic, atraumatic; normal external auditory canal and pinna; tympanic membranes visualized and normal; nasal septum straight; no oropharyngeal lesions or ulcers  Respiratory: normal respiratory effort; clear to auscultation bilaterally; occasional cough    ---    (Note documentation was completed, in part, with [employer] voice-recognition software. Documentation was reviewed, but some grammatical, spelling, and word errors may remain.)

## 2022-06-27 ENCOUNTER — ANCILLARY PROCEDURE (OUTPATIENT)
Dept: CT IMAGING | Facility: CLINIC | Age: 52
End: 2022-06-27
Attending: STUDENT IN AN ORGANIZED HEALTH CARE EDUCATION/TRAINING PROGRAM
Payer: COMMERCIAL

## 2022-06-27 ENCOUNTER — OFFICE VISIT (OUTPATIENT)
Dept: FAMILY MEDICINE | Facility: CLINIC | Age: 52
End: 2022-06-27
Payer: COMMERCIAL

## 2022-06-27 ENCOUNTER — ANCILLARY PROCEDURE (OUTPATIENT)
Dept: ULTRASOUND IMAGING | Facility: CLINIC | Age: 52
End: 2022-06-27
Attending: STUDENT IN AN ORGANIZED HEALTH CARE EDUCATION/TRAINING PROGRAM
Payer: COMMERCIAL

## 2022-06-27 ENCOUNTER — OFFICE VISIT (OUTPATIENT)
Dept: PEDIATRICS | Facility: CLINIC | Age: 52
End: 2022-06-27
Payer: COMMERCIAL

## 2022-06-27 VITALS
DIASTOLIC BLOOD PRESSURE: 74 MMHG | BODY MASS INDEX: 31.5 KG/M2 | RESPIRATION RATE: 16 BRPM | HEIGHT: 66 IN | HEART RATE: 76 BPM | OXYGEN SATURATION: 99 % | TEMPERATURE: 98 F | SYSTOLIC BLOOD PRESSURE: 124 MMHG | WEIGHT: 196 LBS

## 2022-06-27 VITALS
DIASTOLIC BLOOD PRESSURE: 95 MMHG | RESPIRATION RATE: 16 BRPM | TEMPERATURE: 98 F | SYSTOLIC BLOOD PRESSURE: 148 MMHG | HEART RATE: 79 BPM | OXYGEN SATURATION: 97 %

## 2022-06-27 DIAGNOSIS — R10.2 PELVIC PAIN IN FEMALE: ICD-10-CM

## 2022-06-27 DIAGNOSIS — B37.31 YEAST VAGINITIS: ICD-10-CM

## 2022-06-27 DIAGNOSIS — K31.89 MASS OF DUODENUM: Primary | ICD-10-CM

## 2022-06-27 DIAGNOSIS — N89.8 VAGINAL ITCHING: Primary | ICD-10-CM

## 2022-06-27 DIAGNOSIS — R10.32 LLQ ABDOMINAL PAIN: ICD-10-CM

## 2022-06-27 LAB
ALBUMIN SERPL-MCNC: 3.7 G/DL (ref 3.4–5)
ALP SERPL-CCNC: 64 U/L (ref 40–150)
ALT SERPL W P-5'-P-CCNC: 24 U/L (ref 0–50)
ANION GAP SERPL CALCULATED.3IONS-SCNC: 4 MMOL/L (ref 3–14)
AST SERPL W P-5'-P-CCNC: 17 U/L (ref 0–45)
BILIRUB SERPL-MCNC: 0.4 MG/DL (ref 0.2–1.3)
BUN SERPL-MCNC: 14 MG/DL (ref 7–30)
CALCIUM SERPL-MCNC: 9.4 MG/DL (ref 8.5–10.1)
CHLORIDE BLD-SCNC: 108 MMOL/L (ref 94–109)
CLUE CELLS: ABNORMAL
CO2 SERPL-SCNC: 27 MMOL/L (ref 20–32)
CREAT SERPL-MCNC: 0.97 MG/DL (ref 0.52–1.04)
ERYTHROCYTE [DISTWIDTH] IN BLOOD BY AUTOMATED COUNT: 13.6 % (ref 10–15)
GFR SERPL CREATININE-BSD FRML MDRD: 71 ML/MIN/1.73M2
GLUCOSE BLD-MCNC: 109 MG/DL (ref 70–99)
HCT VFR BLD AUTO: 34.9 % (ref 35–47)
HGB BLD-MCNC: 11.8 G/DL (ref 11.7–15.7)
MCH RBC QN AUTO: 29.6 PG (ref 26.5–33)
MCHC RBC AUTO-ENTMCNC: 33.8 G/DL (ref 31.5–36.5)
MCV RBC AUTO: 88 FL (ref 78–100)
PLATELET # BLD AUTO: 241 10E3/UL (ref 150–450)
POTASSIUM BLD-SCNC: 4.2 MMOL/L (ref 3.4–5.3)
PROT SERPL-MCNC: 7.1 G/DL (ref 6.8–8.8)
RADIOLOGIST FLAGS: ABNORMAL
RBC # BLD AUTO: 3.98 10E6/UL (ref 3.8–5.2)
SODIUM SERPL-SCNC: 139 MMOL/L (ref 133–144)
TRICHOMONAS, WET PREP: ABNORMAL
WBC # BLD AUTO: 9.2 10E3/UL (ref 4–11)
WBC'S/HIGH POWER FIELD, WET PREP: ABNORMAL
YEAST, WET PREP: PRESENT

## 2022-06-27 PROCEDURE — 99215 OFFICE O/P EST HI 40 MIN: CPT

## 2022-06-27 PROCEDURE — 76856 US EXAM PELVIC COMPLETE: CPT | Performed by: RADIOLOGY

## 2022-06-27 PROCEDURE — 87491 CHLMYD TRACH DNA AMP PROBE: CPT | Performed by: FAMILY MEDICINE

## 2022-06-27 PROCEDURE — 74177 CT ABD & PELVIS W/CONTRAST: CPT | Performed by: RADIOLOGY

## 2022-06-27 PROCEDURE — 80053 COMPREHEN METABOLIC PANEL: CPT

## 2022-06-27 PROCEDURE — 85027 COMPLETE CBC AUTOMATED: CPT

## 2022-06-27 PROCEDURE — 93976 VASCULAR STUDY: CPT | Mod: 59 | Performed by: RADIOLOGY

## 2022-06-27 PROCEDURE — 99207 REFERRAL TO ACUTE AND DIAGNOSTIC SERVICES: CPT | Performed by: FAMILY MEDICINE

## 2022-06-27 PROCEDURE — 87210 SMEAR WET MOUNT SALINE/INK: CPT | Performed by: FAMILY MEDICINE

## 2022-06-27 PROCEDURE — 36415 COLL VENOUS BLD VENIPUNCTURE: CPT

## 2022-06-27 PROCEDURE — 87591 N.GONORRHOEAE DNA AMP PROB: CPT | Performed by: FAMILY MEDICINE

## 2022-06-27 PROCEDURE — 76830 TRANSVAGINAL US NON-OB: CPT | Performed by: RADIOLOGY

## 2022-06-27 RX ORDER — FLUCONAZOLE 150 MG/1
150 TABLET ORAL ONCE
Qty: 1 TABLET | Refills: 0 | Status: SHIPPED | OUTPATIENT
Start: 2022-06-27 | End: 2022-06-27

## 2022-06-27 RX ORDER — FLUCONAZOLE 150 MG/1
TABLET ORAL
Qty: 2 TABLET | Refills: 0 | Status: SHIPPED | OUTPATIENT
Start: 2022-06-27 | End: 2022-07-13

## 2022-06-27 RX ORDER — IOPAMIDOL 755 MG/ML
110 INJECTION, SOLUTION INTRAVASCULAR ONCE
Status: COMPLETED | OUTPATIENT
Start: 2022-06-27 | End: 2022-06-27

## 2022-06-27 RX ADMIN — IOPAMIDOL 110 ML: 755 INJECTION, SOLUTION INTRAVASCULAR at 13:45

## 2022-06-27 ASSESSMENT — PAIN SCALES - GENERAL: PAINLEVEL: MODERATE PAIN (5)

## 2022-06-27 ASSESSMENT — ASTHMA QUESTIONNAIRES
QUESTION_4 LAST FOUR WEEKS HOW OFTEN HAVE YOU USED YOUR RESCUE INHALER OR NEBULIZER MEDICATION (SUCH AS ALBUTEROL): NOT AT ALL
QUESTION_1 LAST FOUR WEEKS HOW MUCH OF THE TIME DID YOUR ASTHMA KEEP YOU FROM GETTING AS MUCH DONE AT WORK, SCHOOL OR AT HOME: A LITTLE OF THE TIME
QUESTION_5 LAST FOUR WEEKS HOW WOULD YOU RATE YOUR ASTHMA CONTROL: COMPLETELY CONTROLLED
QUESTION_3 LAST FOUR WEEKS HOW OFTEN DID YOUR ASTHMA SYMPTOMS (WHEEZING, COUGHING, SHORTNESS OF BREATH, CHEST TIGHTNESS OR PAIN) WAKE YOU UP AT NIGHT OR EARLIER THAN USUAL IN THE MORNING: NOT AT ALL
ACT_TOTALSCORE: 24
ACT_TOTALSCORE: 24
QUESTION_2 LAST FOUR WEEKS HOW OFTEN HAVE YOU HAD SHORTNESS OF BREATH: NOT AT ALL

## 2022-06-27 NOTE — Clinical Note
Alfreda - I saw Edith in [facility] today for pelvic pain and incidentally on CT found a 3.2 cm mass adjacent to the duodenum/proximal jejunum. Radiology recommends MRI or endoscopic US for further imaging of the mass. I ordered the MRI and scheduling it closer to home and also set up an appointment with you to go over MRI results. Just wanted to give you a heads up. Thanks, Yanira

## 2022-06-27 NOTE — PROGRESS NOTES
ICD-10-CM    1. Vaginal itching  N89.8 Wet prep - Clinic Collect     Chlamydia trachomatis PCR     Neisseria gonorrhoeae PCR     CBC with platelets and differential   2. Pelvic pain in female  R10.2 US Pelvic Complete with Transvaginal     CBC with platelets and differential   3. Yeast vaginitis  B37.3 fluconazole (DIFLUCAN) 150 MG tablet     New to this provider and clinic  Initially was here for vaginal irritation but later in the visit reported of pelvic acute pain this am.  She is on her period but reports this pain is new to her.  No history of std, no bowel issues, no fever, no UTI concerns  She has history of vaginitis and many years ago ovarian cyst. She is now endorsing that pain may be similar to her previous ovarian cyst diagnosis.  She is on her period , and  Irregular and is not on contraception but reports her  has had a vasectomy.  On vaginal exam she was tearful and reported pain, I did have a chaperon in the room (my MA ) during the entire pelvic exam.  She does have yeast on wet prep which we are treating but given the level of pain on exam I do think she needs to be evaluated further at [facility] center with imaging test as well as other labs that will come back right away.  Discussed case with provider and they are accepting her, discussed with patient and is in agreement        Spent  30min greater than 50% of counseling and coordination of care for the conditions documented above.        Awa Michaels is a 52 year old accompanied by her ., presenting for the following health issues:  Vaginal Problem (Yeast concern )    Patient declines SELF COLLECT and wants a vaginal exam today.     History of Present Illness       Reason for visit:  Yeast infection  Symptom onset:  1-3 days ago  Symptoms include:  Vaginal distress  Symptom intensity:  Severe  Symptom progression:  Worsening  Had these symptoms before:  Yes  Has tried/received treatment for these symptoms:  Yes  Previous treatment  was successful:  Yes  Prior treatment description:  Diflucan  What makes it worse:  Taking a bath  What makes it better:  Pain & allergy medicine    She eats 4 or more servings of fruits and vegetables daily.She consumes 2 sweetened beverage(s) daily.She exercises with enough effort to increase her heart rate 20 to 29 minutes per day.  She exercises with enough effort to increase her heart rate 6 days per week.   She is taking medications regularly.       NEW TO THIS CLINIC  Drove all the way from [facility]    She took a bath and started to have severe itching/pain, no urinary sx, she reports of not feeling well this am  some itching,, no discharge  Periods just started today, not her normal period pain    She reports this am horrible pelvic pain, new to her, no upper respiratory infection or urinary tract infection sx  Her  has had a vasectomy, no history of ectopic    No bowel changes, no fever  She denies similar symptoms      No std  Sexually active , no history of std  New lubricant that she used    Patient now reports of many years ago having ovarian cyst        Referral to Acute and Diagnostic Services    [facility] has been contacted at [phone] ([place]) to confirm patient acceptance. The transition to Acute & Diagnostic Services Clinic has been discussed with patient, and she agrees with next level of care.  Patient understands that evaluation/treatment at [facility] typically takes significantly longer than in clinic/urgent care (>2 hours).          Special issues:  None          Referral placed: Yes  Patient has transportation arranged and will travel to the [place] without delay: Yes  Patient aware not to eat or drink. Yes    The following provider has assessed this patient for intervention at [facility], and directed the patient for referral: Madi Tegede Ethan, DO    Hellina Tegagne Ethan, DO      Review of Systems   Constitutional, HEENT,  "cardiovascular, pulmonary, GI, , musculoskeletal, neuro, skin, endocrine and psych systems are negative, except as otherwise noted.      Objective    /74   Pulse 76   Temp 98  F (36.7  C) (Oral)   Resp 16   Ht 1.676 m (5' 6\")   Wt 88.9 kg (196 lb)   LMP 06/27/2022   SpO2 99%   BMI 31.64 kg/m    Body mass index is 31.64 kg/m .  Physical Exam   GENERAL: healthy, alert and no distress  NECK: no adenopathy, no asymmetry, masses, or scars and thyroid normal to palpation  RESP: lungs clear to auscultation - no rales, rhonchi or wheezes  CV: regular rate and rhythm, normal S1 S2, no S3 or S4, no murmur, click or rub, no peripheral edema and peripheral pulses strong  ABDOMEN: soft, nontender, no hepatosplenomegaly, no masses and bowel sounds normal   (female): normal female external genitalia, normal urethral meatus, vaginal mucosa, some blood from cervical, no mass seen,  normal cervix tender on bimanual exam,  Fullness of uterus,   MS: no gross musculoskeletal defects noted, no edema    Results for orders placed or performed in visit on 06/27/22   Wet prep - Clinic Collect     Status: Abnormal    Specimen: Vagina; Swab   Result Value Ref Range    Trichomonas Absent Absent    Yeast Present (A) Absent    Clue Cells Absent Absent    WBCs/high power field 1+ (A) None                   .  ..  "

## 2022-06-27 NOTE — PROGRESS NOTES
"  Assessment & Plan     Mass of duodenum  Patient presented with pelvic pain bilaterally and vaginal yeast infection. She is very tender externally particularly in the LLQ on exam and moderate in the RLQ. Her pelvic ultrasound is normal. Given the degree of tenderness I recommended further imaging with CT to rule out diverticulitis. The CT shows no evidence of diverticulitis however it did show a 3.2 cm ill-defined mass in the area of the duodenum/proximal jejunum with differential of inflammatory vs desmoid tumor vs malignancy. MRI or endoscopic US recommended for subsequent imaging to further evaluate the mass. She is not having symptoms in the mid or upper abdomen though she has had intermittent bloating and nausea but has had those symptoms for years if she eats certain foods. I ordered the MRI and am scheduling her for a visit with her PCP to go over the results. I suspect the tenderness in the LLQ and RLQ is from the yeast infection causing irritation as the pain was first elicited on bimanual exam at her first appointment today. She is going to treat with fluconazole and monitor those symptoms.   - MR Abdomen w/o & w Contrast    Pelvic pain in female  - US Pelvis Cmplt w Transvag & Doppler LmtPel Duplex Limited    Yeast vaginitis  - fluconazole (DIFLUCAN) 150 MG tablet  Dispense: 2 tablet; Refill: 0    LLQ abdominal pain  - CT Abdomen Pelvis w Contrast  - Comprehensive metabolic panel (BMP + Alb, Alk Phos, ALT, AST, Total. Bili, TP)  - CBC with platelets        60 minutes spent on the date of the encounter doing chart review, review of test results, interpretation of tests, patient visit and documentation      BMI:   Estimated body mass index is 31.64 kg/m  as calculated from the following:    Height as of an earlier encounter on 6/27/22: 1.676 m (5' 6\").    Weight as of an earlier encounter on 6/27/22: 88.9 kg (196 lb).       No follow-ups on file.    [facility] " MATT Michaels is a 52 year old presenting for the following health issues:  Abdominal Pain      HPI     Patient states she started to experience abdominal pain and yeast infection symptoms for a few days now. She states yesterday she took a bath and caused her yeast infection symptoms to increase.     Patient also just got her period today as well. She states during her pelvic exam, the discomfort sent her into tears.     Pain History:  When did you first notice your pain? - Acute Pain   Have you seen anyone else for your pain? Yes - Primary Care  Where in your body do you have pain? Abdominal/Flank Pain  Onset/Duration: A few days  Description:   Character: Cramping  Location: pelvic region  Radiation: None  Intensity: moderate  Progression of Symptoms:  worsening  Accompanying Signs & Symptoms:  Fever/Chills: no  Gas/Bloating: YES  Nausea: YES, slightly. No appetite  Vomitting: no  Diarrhea: no  Constipation: no  Dysuria or Hematuria: no  History:   Trauma: no  Previous similar pain: no  Previous tests done: none  Precipitating factors:   Does the pain change with:     Food: no    Bowel Movement: no    Urination: no   Other factors:  no  Therapies tried and outcome: Patient was prescribed diflucan today during her visit to her primary.      Reminds her of when she had ovarian cysts many years ago. Had some GI symptoms a couple days ago with bloating that has mostly resolved. Has bloating from time to time if she eats something that upsets her stomach.       Patient's last menstrual period was 06/27/2022. her period was 3 days late but flow is similar to what it always is on the first day.     Wet prep in clinic was positive for yeast and diflucan ordered by Dr. Long. She doesn't usually have this degree of pain with a yeast infection.     History of appendectomy.     Patient Active Problem List   Diagnosis     Impaired fasting glucose     History of ventricular tachycardia     Obesity (BMI  30-39.9)     Hypertriglyceridemia     Current Outpatient Medications   Medication     fluconazole (DIFLUCAN) 150 MG tablet     loratadine (CLARITIN) 10 MG tablet     metoprolol succinate ER (TOPROL-XL) 25 MG 24 hr tablet     albuterol (PROAIR HFA/PROVENTIL HFA/VENTOLIN HFA) 108 (90 Base) MCG/ACT inhaler     guaiFENesin-dextromethorphan (ROBITUSSIN DM) 100-10 MG/5ML syrup     No current facility-administered medications for this visit.         Review of Systems   Constitutional, HEENT, cardiovascular, pulmonary, GI, , musculoskeletal, neuro, skin, endocrine and psych systems are negative, except as otherwise noted.      Objective    BP (!) 148/95 (BP Location: Right arm, Patient Position: Chair, Cuff Size: Adult Regular)   Pulse 79   Temp 98  F (36.7  C) (Oral)   Resp 16   LMP 06/27/2022   SpO2 97%   There is no height or weight on file to calculate BMI.  Physical Exam   GENERAL: healthy, alert and no acute distress  ABDOMEN: tenderness RLQ and LLQ, LLQ > RLQ and no organomegaly or masses  MS: no gross musculoskeletal defects noted, no edema  SKIN: no suspicious lesions or rashes  NEURO: Normal strength and tone, mentation intact and speech normal  PSYCH: mentation appears normal, affect normal/bright    Results for orders placed or performed in visit on 06/27/22 (from the past 24 hour(s))   US Pelvis Cmplt w Transvag & Doppler LmtPel Duplex Limited    Narrative    EXAMINATION: US PELVIS COMPLETE W TRANSVAGINAL AND DOPPLER LIMITED  6/27/2022 12:10 PM      CLINICAL HISTORY:  pelvic pain that started a couple days ago, is very tender overlying  ovaries bilaterally, left > right; Pelvic pain in female    COMPARISON: CT 10/2/2021    PROCEDURE COMMENT: Both transabdominal and transvaginal imaging  performed of the pelvis with color and spectral Doppler.    FINDINGS:  The uterus measures  6.0 cm x 11.3 cm x 6.9 cm. No focal myometrial  mass.     The endometrial stripe measures 19 mm. The endometrial stripe  is  ill-defined but is within normal limits due to current menstruation.    The right ovary measures 2.7 cm x 1.6 cm x 1.8 cm. The left ovary  measures 2.9 cm x 3.1 cm x 2.9 cm. Normal ovarian morphology.     There is no simple free fluid in the pelvis.  No adnexal mass.      Impression    IMPRESSION:  1. Normal pelvic ultrasound.    BILLIE FERNANDES MD         SYSTEM ID:  [id number]   Comprehensive metabolic panel (BMP + Alb, Alk Phos, ALT, AST, Total. Bili, TP)   Result Value Ref Range    Sodium 139 133 - 144 mmol/L    Potassium 4.2 3.4 - 5.3 mmol/L    Chloride 108 94 - 109 mmol/L    Carbon Dioxide (CO2) 27 20 - 32 mmol/L    Anion Gap 4 3 - 14 mmol/L    Urea Nitrogen 14 7 - 30 mg/dL    Creatinine 0.97 0.52 - 1.04 mg/dL    Calcium 9.4 8.5 - 10.1 mg/dL    Glucose 109 (H) 70 - 99 mg/dL    Alkaline Phosphatase 64 40 - 150 U/L    AST 17 0 - 45 U/L    ALT 24 0 - 50 U/L    Protein Total 7.1 6.8 - 8.8 g/dL    Albumin 3.7 3.4 - 5.0 g/dL    Bilirubin Total 0.4 0.2 - 1.3 mg/dL    GFR Estimate 71 >60 mL/min/1.73m2   CBC with platelets   Result Value Ref Range    WBC Count 9.2 4.0 - 11.0 10e3/uL    RBC Count 3.98 3.80 - 5.20 10e6/uL    Hemoglobin 11.8 11.7 - 15.7 g/dL    Hematocrit 34.9 (L) 35.0 - 47.0 %    MCV 88 78 - 100 fL    MCH 29.6 26.5 - 33.0 pg    MCHC 33.8 31.5 - 36.5 g/dL    RDW 13.6 10.0 - 15.0 %    Platelet Count 241 150 - 450 10e3/uL   CT Abdomen Pelvis w Contrast   Result Value Ref Range    Radiologist flags Small bowel mass (Urgent)     Narrative    Examination:  CT ABDOMEN PELVIS W CONTRAST 6/27/2022 2:10 PM     History: Left lower quadrant abdominal pain, rule out diverticulitis    Comparison: Pelvic ultrasound 6/27/2022    Technique: CT of the abdomen and pelvis were obtained with contrast.  Sagittal and coronal reconstructions created and reviewed. Contrast  dose: iopamidol (ISOVUE-370) solution 110 mL    Findings:     Abdomen and pelvis: Noncirrhotic morphology of the liver. Diffusely  hypoattenuating hepatic  parenchyma. Fluid attenuation cystic lesion  within hepatic segment 3 (image 3:128). Ill-defined focal parenchymal  hypoattenuation adjacent to the gallbladder fossa measuring  approximately 2.8 cm (image 3:30). Gallbladder is surgically absent.  No intra- or extrahepatic biliary dilation. Spleen, pancreas, and  adrenal glands are unremarkable. Kidneys demonstrate symmetric  enhancement without solid lesions, hydronephrosis, or nephrolithiasis.  Ureters and urinary bladder are unremarkable. Uterus is heterogeneous  and mildly enlarged for age with suggestion of junctional zone  thickening. Multiple small nabothian cysts. The right ovary is  positioned posterior to the uterus. Dominant left ovarian follicle  measuring 2.1 cm.    No evidence of bowel obstruction. A few scattered colonic diverticula.  No evidence of acute diverticulitis. The appendix is surgically  absent. Ill-defined masslike soft tissue abnormality adjacent to the  distal duodenum/proximal jejunum measuring 3.2 cm (image 3:172).    No suspicious abdominal or pelvic lymphadenopathy. No free fluid. No  pneumoperitoneum.    Abdominal aorta is normal in caliber and patent. Celiac and mesenteric  artery origins are unremarkable. Portal veins and IVC are patent.    Lower thorax: Stable 3 mm solid nodule in the right lung base (image  3:42).    Bones and soft tissues: No acute or suspicious osseous abnormality.      Impression    Impression:   1. No acute findings in the left lower quadrant quadrant to explain  left lower quadrant pain. No evidence of acute diverticulitis.  2. Ill-defined masslike soft tissue abnormality adjacent to the distal  duodenum/proximal jejunum, nonspecific, could be inflammatory or  neoplastic. A desmoid tumor could have a similar appearance. Consider  further characterization with abdominal MRI or endoscopic ultrasound  with tissue sampling.  3. Focal hepatic parenchymal hypoattenuation adjacent to the  gallbladder fossa, likely  focal fatty deposition, increased since  prior exam.  4. Findings suggestive of adenomyosis.    [Access Center: Small bowel mass]    This report will be copied to the [facility] to ensure a  provider acknowledges the finding. Access Center is available Monday  through Friday 8am-3:30 pm.     I have personally reviewed the examination and initial interpretation  and I agree with the findings.    JALEESA ELLINGTON DO         SYSTEM ID:  [id number]     No results found for this visit on 06/27/22.  No results found.                .  ..

## 2022-06-28 LAB
C TRACH DNA SPEC QL NAA+PROBE: NEGATIVE
N GONORRHOEA DNA SPEC QL NAA+PROBE: NEGATIVE

## 2022-06-28 NOTE — RESULT ENCOUNTER NOTE
All your results are essentially mauricio. Please contact me if you have any questions.  Take care,  Madi Long D.O.

## 2022-07-10 ENCOUNTER — OFFICE VISIT (OUTPATIENT)
Dept: URGENT CARE | Facility: URGENT CARE | Age: 52
End: 2022-07-10
Payer: COMMERCIAL

## 2022-07-10 VITALS
OXYGEN SATURATION: 98 % | DIASTOLIC BLOOD PRESSURE: 83 MMHG | TEMPERATURE: 97.5 F | SYSTOLIC BLOOD PRESSURE: 130 MMHG | WEIGHT: 196 LBS | BODY MASS INDEX: 31.64 KG/M2 | RESPIRATION RATE: 16 BRPM | HEART RATE: 89 BPM

## 2022-07-10 DIAGNOSIS — M54.50 ACUTE BILATERAL LOW BACK PAIN WITHOUT SCIATICA: ICD-10-CM

## 2022-07-10 DIAGNOSIS — R30.0 DYSURIA: ICD-10-CM

## 2022-07-10 DIAGNOSIS — R10.30 LOWER ABDOMINAL PAIN: Primary | ICD-10-CM

## 2022-07-10 LAB
ALBUMIN UR-MCNC: NEGATIVE MG/DL
APPEARANCE UR: CLEAR
BACTERIA #/AREA URNS HPF: ABNORMAL /HPF
BASOPHILS # BLD AUTO: 0.1 10E3/UL (ref 0–0.2)
BASOPHILS NFR BLD AUTO: 1 %
BILIRUB UR QL STRIP: NEGATIVE
CLUE CELLS: ABNORMAL
COLOR UR AUTO: YELLOW
EOSINOPHIL # BLD AUTO: 0.1 10E3/UL (ref 0–0.7)
EOSINOPHIL NFR BLD AUTO: 1 %
ERYTHROCYTE [DISTWIDTH] IN BLOOD BY AUTOMATED COUNT: 13.9 % (ref 10–15)
GLUCOSE UR STRIP-MCNC: NEGATIVE MG/DL
HCT VFR BLD AUTO: 36.1 % (ref 35–47)
HGB BLD-MCNC: 11.7 G/DL (ref 11.7–15.7)
HGB UR QL STRIP: NEGATIVE
KETONES UR STRIP-MCNC: NEGATIVE MG/DL
LEUKOCYTE ESTERASE UR QL STRIP: NEGATIVE
LYMPHOCYTES # BLD AUTO: 1.6 10E3/UL (ref 0.8–5.3)
LYMPHOCYTES NFR BLD AUTO: 18 %
MCH RBC QN AUTO: 29.4 PG (ref 26.5–33)
MCHC RBC AUTO-ENTMCNC: 32.4 G/DL (ref 31.5–36.5)
MCV RBC AUTO: 91 FL (ref 78–100)
MONOCYTES # BLD AUTO: 0.4 10E3/UL (ref 0–1.3)
MONOCYTES NFR BLD AUTO: 5 %
MUCOUS THREADS #/AREA URNS LPF: PRESENT /LPF
NEUTROPHILS # BLD AUTO: 6.5 10E3/UL (ref 1.6–8.3)
NEUTROPHILS NFR BLD AUTO: 75 %
NITRATE UR QL: NEGATIVE
PH UR STRIP: 5.5 [PH] (ref 5–7)
PLATELET # BLD AUTO: 242 10E3/UL (ref 150–450)
RBC # BLD AUTO: 3.98 10E6/UL (ref 3.8–5.2)
RBC #/AREA URNS AUTO: ABNORMAL /HPF
SP GR UR STRIP: >=1.03 (ref 1–1.03)
SQUAMOUS #/AREA URNS AUTO: ABNORMAL /LPF
TRICHOMONAS, WET PREP: ABNORMAL
UROBILINOGEN UR STRIP-ACNC: 0.2 E.U./DL
WBC # BLD AUTO: 8.7 10E3/UL (ref 4–11)
WBC #/AREA URNS AUTO: ABNORMAL /HPF
WBC'S/HIGH POWER FIELD, WET PREP: ABNORMAL
YEAST, WET PREP: ABNORMAL

## 2022-07-10 PROCEDURE — 99214 OFFICE O/P EST MOD 30 MIN: CPT | Performed by: FAMILY MEDICINE

## 2022-07-10 PROCEDURE — 36415 COLL VENOUS BLD VENIPUNCTURE: CPT | Performed by: FAMILY MEDICINE

## 2022-07-10 PROCEDURE — 81001 URINALYSIS AUTO W/SCOPE: CPT | Performed by: FAMILY MEDICINE

## 2022-07-10 PROCEDURE — 85025 COMPLETE CBC W/AUTO DIFF WBC: CPT | Performed by: FAMILY MEDICINE

## 2022-07-10 PROCEDURE — 87210 SMEAR WET MOUNT SALINE/INK: CPT | Performed by: FAMILY MEDICINE

## 2022-07-10 NOTE — PROGRESS NOTES
SUBJECTIVE: Xenia Snow is a 52 year old female presenting with a chief complaint of low abd pain and back pain.  Onset of symptoms was day(s) ago.  Current and Associated symptoms: dysuria  Treatment measures tried include diflucan for yeats.  Predisposing factors include None.    Past Medical History:   Diagnosis Date     History of ventricular tachycardia     on Toprol     Hypertriglyceridemia      Impaired fasting glucose      Obesity (BMI 30-39.9)      Allergies   Allergen Reactions     Amitiza [Lubiprostone] GI Disturbance     Social History     Tobacco Use     Smoking status: Never Smoker     Smokeless tobacco: Never Used   Substance Use Topics     Alcohol use: Never       ROS:  SKIN: no rash  GI: no vomiting    OBJECTIVE:  /83   Pulse 89   Temp 97.5  F (36.4  C) (Tympanic)   Resp 16   Wt 88.9 kg (196 lb)   LMP 06/27/2022   SpO2 98%   BMI 31.64 kg/m     GENERAL APPEARANCE: healthy, alert and no distress  ABDOMEN:  soft, bilateral low tender  SKIN: no suspicious lesions or rashes  No CVA pain    Xray without acute findings, no obstruction/free air read by Slade Jack D.O.      ICD-10-CM    1. Lower abdominal pain  R10.30 UMIC - Urine Micro Only     CBC with Platelets & Differential     XR Abdomen 2 Views   2. Dysuria  R30.0 Wet prep - Clinic Collect     UA macro with reflex to Microscopic and Culture - Clinc Collect     CBC with Platelets & Differential     XR Abdomen 2 Views   3. Acute bilateral low back pain without sciatica  M54.50 UMIC - Urine Micro Only     CBC with Platelets & Differential     XR Abdomen 2 Views     Has MRI tomorrow  Fluids/Rest, f/u if worse/not any better

## 2022-07-11 ENCOUNTER — ANCILLARY PROCEDURE (OUTPATIENT)
Dept: MRI IMAGING | Facility: CLINIC | Age: 52
End: 2022-07-11
Attending: STUDENT IN AN ORGANIZED HEALTH CARE EDUCATION/TRAINING PROGRAM
Payer: COMMERCIAL

## 2022-07-11 DIAGNOSIS — K31.89 MASS OF DUODENUM: ICD-10-CM

## 2022-07-11 PROCEDURE — 255N000002 HC RX 255 OP 636: Performed by: STUDENT IN AN ORGANIZED HEALTH CARE EDUCATION/TRAINING PROGRAM

## 2022-07-11 PROCEDURE — A9585 GADOBUTROL INJECTION: HCPCS | Performed by: STUDENT IN AN ORGANIZED HEALTH CARE EDUCATION/TRAINING PROGRAM

## 2022-07-11 PROCEDURE — 74183 MRI ABD W/O CNTR FLWD CNTR: CPT

## 2022-07-11 RX ORDER — GADOBUTROL 604.72 MG/ML
9 INJECTION INTRAVENOUS ONCE
Status: COMPLETED | OUTPATIENT
Start: 2022-07-11 | End: 2022-07-11

## 2022-07-11 RX ADMIN — GADOBUTROL 9 ML: 604.72 INJECTION INTRAVENOUS at 10:33

## 2022-07-13 DIAGNOSIS — K63.89 MESENTERIC MASS: Primary | ICD-10-CM

## 2022-07-14 ENCOUNTER — PATIENT OUTREACH (OUTPATIENT)
Dept: SURGERY | Facility: CLINIC | Age: 52
End: 2022-07-14

## 2022-07-14 DIAGNOSIS — K63.89 MESENTERIC MASS: Primary | ICD-10-CM

## 2022-07-14 NOTE — PROGRESS NOTES
New Patient Oncology Nurse Navigator Note   Referring provider: Dr. 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      Clinical History (per Nurse review of records provided):      See book marked documents:       Referring MD office note    Imaging reports     Lab Results   Component Value Date/Time    ALBUMIN 3.7 06/27/2022 12:48 PM    ALBUMIN 4.0 01/21/2021 08:33 AM    AST 17 06/27/2022 12:48 PM    AST 12 01/21/2021 08:33 AM    ALT 24 06/27/2022 12:48 PM    ALT 19 01/21/2021 08:33 AM    ALKPHOS 64 06/27/2022 12:48 PM    ALKPHOS 55 01/21/2021 08:33 AM    BILITOTAL 0.4 06/27/2022 12:48 PM    BILITOTAL 0.4 01/21/2021 08:33 AM    WBC 8.7 07/10/2022 09:52 AM    WBC 7.9 01/23/2020 07:33 PM          Past Medical History:   Diagnosis Date     History of ventricular tachycardia     on Toprol     Hypertriglyceridemia      Impaired fasting glucose      Obesity (BMI 30-39.9)        Past Surgical History:   Procedure Laterality Date     DILATION AND CURETTAGE       DILATION AND EVACUATION       LAPAROSCOPIC APPENDECTOMY  2018     LAPAROSCOPIC CHOLECYSTECTOMY  2017       Current Outpatient Medications   Medication Sig Dispense Refill     albuterol (PROAIR HFA/PROVENTIL HFA/VENTOLIN HFA) 108 (90 Base) MCG/ACT inhaler Inhale 1-2 puffs into the lungs every 6 hours for 5 days 8.5 g 0     loratadine (CLARITIN) 10 MG tablet Take 1 tablet (10 mg) by mouth daily 90 tablet 3     metoprolol succinate ER (TOPROL-XL) 25 MG 24 hr tablet Take 1 tablet (25 mg) by mouth daily 90 tablet 3           Allergies   Allergen Reactions     Amitiza [Lubiprostone] GI Disturbance          Patient Active Problem List   Diagnosis     Impaired fasting glucose     History of ventricular tachycardia     Obesity (BMI 30-39.9)     Hypertriglyceridemia         Clinical Assessment  / Barriers to Care (Per Nurse):    None at this time.     [facility]     Records Needed:     NONE AT THIS TIME      Additional testing needed prior to consult:     EUS    Referral updates and Plan:       Consult with Surgical Oncology   EUS biopsy     07/14/2022 3:43 PM - Called and spoke with patient regarding referral. Informed her I am in process of reviewing with MD.     07/15/2022 2:46 PM - Called and spoke with patient regarding plan for biopsy with GI prior to consult. Informed her that we will place referral for them to contact her to arrange biopsy. Informed her I will continue to monitor the chart for the appointment and then we will contact her to arrange for surgical consult about 1 week post biopsy. She agreed with plan and will await the scheduling calls.     Asiya Bee, RN, BSN   Surgical Oncology New Patient Nurse Navigator  [facility]  [phone]

## 2022-07-15 ENCOUNTER — PATIENT OUTREACH (OUTPATIENT)
Dept: GASTROENTEROLOGY | Facility: CLINIC | Age: 52
End: 2022-07-15

## 2022-07-15 ENCOUNTER — PREP FOR PROCEDURE (OUTPATIENT)
Dept: GASTROENTEROLOGY | Facility: CLINIC | Age: 52
End: 2022-07-15

## 2022-07-15 DIAGNOSIS — K31.89 DUODENAL MASS: Primary | ICD-10-CM

## 2022-07-15 NOTE — PROGRESS NOTES
Called to discuss with patient.     Procedure/Imaging/Clinic: enteroscopy/EUS   Physician: Toney   Timin/18 or  or next avail   Procedure length: 60 min   Anesthesia: Gen   Dx: periduodenal mass   Tier: 2   Location: [place]     Discussed possible date of , will see if OR can add on.    Explained they can expect a call from  for date and time of procedure, will need a , someone to stay with them for 24 hours and should stay in town for 24 hours (within 45 min of Hospital) post procedure    Patient needs to get pre-op physical completed. If outside [facility] system will need physical faxed to number [phone]   If you do not get a preop physical, your procedure could be cancelled, patient voiced understanding*    Preop Plan: office visit 22    Med Review    Blood thinner -  none  ASA - none  Diabetic - none    COVID test discussed: discussed needed within 4 days of procedure, pt will do at home COVID testing option and bring on day of procedure    Patient Education r/t procedure: mychart invitation sent    Does patient have any history of gastric bypass/gastric surgery/altered panc/bili anatomy? Gallbladder and appendix removed    A pre-op nurse will call 1-2 days prior to the procedure.    NPO/Prep:     Verbalized understanding of all instructions. All questions answered.     Case request placed, call made to OR to see if able to add on.   OR will hold spot at 11:15am until case request is signed    [id number]  Called pt to update on timing of procedure, OR will be scheduling for 11:15am, with a 9:15am arrival. Instructed pt on NPO 8 hours solids/2 hours liquids. Visitor restrictions reviewed and provided address for procedure. All questions answered    Rima Willingham, RN, BSN,   [facility]  Care coordinator

## 2022-07-17 ENCOUNTER — ANESTHESIA EVENT (OUTPATIENT)
Dept: SURGERY | Facility: CLINIC | Age: 52
End: 2022-07-17
Payer: COMMERCIAL

## 2022-07-18 ENCOUNTER — ANESTHESIA (OUTPATIENT)
Dept: SURGERY | Facility: CLINIC | Age: 52
End: 2022-07-18
Payer: COMMERCIAL

## 2022-07-18 ENCOUNTER — HOSPITAL ENCOUNTER (OUTPATIENT)
Facility: CLINIC | Age: 52
Discharge: HOME | End: 2022-07-18
Attending: INTERNAL MEDICINE | Admitting: INTERNAL MEDICINE
Payer: COMMERCIAL

## 2022-07-18 VITALS
HEART RATE: 82 BPM | WEIGHT: 194.31 LBS | HEIGHT: 66 IN | BODY MASS INDEX: 31.23 KG/M2 | OXYGEN SATURATION: 97 % | SYSTOLIC BLOOD PRESSURE: 130 MMHG | TEMPERATURE: 98.4 F | RESPIRATION RATE: 16 BRPM | DIASTOLIC BLOOD PRESSURE: 81 MMHG

## 2022-07-18 LAB
GLUCOSE BLDC GLUCOMTR-MCNC: 126 MG/DL (ref 70–99)
UPPER EUS: NORMAL

## 2022-07-18 PROCEDURE — 710N000012 HC RECOVERY PHASE 2, PER MINUTE: Performed by: INTERNAL MEDICINE

## 2022-07-18 PROCEDURE — 250N000009 HC RX 250: Performed by: NURSE ANESTHETIST, CERTIFIED REGISTERED

## 2022-07-18 PROCEDURE — 272N000001 HC OR GENERAL SUPPLY STERILE: Performed by: INTERNAL MEDICINE

## 2022-07-18 PROCEDURE — 250N000009 HC RX 250: Performed by: INTERNAL MEDICINE

## 2022-07-18 PROCEDURE — 710N000010 HC RECOVERY PHASE 1, LEVEL 2, PER MIN: Performed by: INTERNAL MEDICINE

## 2022-07-18 PROCEDURE — 250N000024 HC ISOFLURANE, PER MIN: Performed by: INTERNAL MEDICINE

## 2022-07-18 PROCEDURE — 82962 GLUCOSE BLOOD TEST: CPT

## 2022-07-18 PROCEDURE — 370N000017 HC ANESTHESIA TECHNICAL FEE, PER MIN: Performed by: INTERNAL MEDICINE

## 2022-07-18 PROCEDURE — 88342 IMHCHEM/IMCYTCHM 1ST ANTB: CPT | Mod: TC | Performed by: INTERNAL MEDICINE

## 2022-07-18 PROCEDURE — 360N000083 HC SURGERY LEVEL 3 W/ FLUORO, PER MIN: Performed by: INTERNAL MEDICINE

## 2022-07-18 PROCEDURE — 250N000011 HC RX IP 250 OP 636: Performed by: NURSE ANESTHETIST, CERTIFIED REGISTERED

## 2022-07-18 PROCEDURE — 999N000141 HC STATISTIC PRE-PROCEDURE NURSING ASSESSMENT: Performed by: INTERNAL MEDICINE

## 2022-07-18 RX ORDER — LIDOCAINE 40 MG/G
CREAM TOPICAL
Status: DISCONTINUED | OUTPATIENT
Start: 2022-07-18 | End: 2022-07-18 | Stop reason: HOSPADM

## 2022-07-18 RX ORDER — HYDROMORPHONE HYDROCHLORIDE 1 MG/ML
0.2 INJECTION, SOLUTION INTRAMUSCULAR; INTRAVENOUS; SUBCUTANEOUS EVERY 5 MIN PRN
Status: DISCONTINUED | OUTPATIENT
Start: 2022-07-18 | End: 2022-07-18 | Stop reason: HOSPADM

## 2022-07-18 RX ORDER — SODIUM CHLORIDE, SODIUM LACTATE, POTASSIUM CHLORIDE, CALCIUM CHLORIDE 600; 310; 30; 20 MG/100ML; MG/100ML; MG/100ML; MG/100ML
INJECTION, SOLUTION INTRAVENOUS CONTINUOUS
Status: DISCONTINUED | OUTPATIENT
Start: 2022-07-18 | End: 2022-07-18 | Stop reason: HOSPADM

## 2022-07-18 RX ORDER — FLUMAZENIL 0.1 MG/ML
0.2 INJECTION, SOLUTION INTRAVENOUS
Status: DISCONTINUED | OUTPATIENT
Start: 2022-07-18 | End: 2022-07-18 | Stop reason: HOSPADM

## 2022-07-18 RX ORDER — NALOXONE HYDROCHLORIDE 0.4 MG/ML
0.4 INJECTION, SOLUTION INTRAMUSCULAR; INTRAVENOUS; SUBCUTANEOUS
Status: DISCONTINUED | OUTPATIENT
Start: 2022-07-18 | End: 2022-07-18 | Stop reason: HOSPADM

## 2022-07-18 RX ORDER — ONDANSETRON 4 MG/1
4 TABLET, ORALLY DISINTEGRATING ORAL EVERY 30 MIN PRN
Status: DISCONTINUED | OUTPATIENT
Start: 2022-07-18 | End: 2022-07-18 | Stop reason: HOSPADM

## 2022-07-18 RX ORDER — ONDANSETRON 2 MG/ML
4 INJECTION INTRAMUSCULAR; INTRAVENOUS EVERY 6 HOURS PRN
Status: DISCONTINUED | OUTPATIENT
Start: 2022-07-18 | End: 2022-07-18 | Stop reason: HOSPADM

## 2022-07-18 RX ORDER — DEXAMETHASONE SODIUM PHOSPHATE 4 MG/ML
INJECTION, SOLUTION INTRA-ARTICULAR; INTRALESIONAL; INTRAMUSCULAR; INTRAVENOUS; SOFT TISSUE PRN
Status: DISCONTINUED | OUTPATIENT
Start: 2022-07-18 | End: 2022-07-18

## 2022-07-18 RX ORDER — SODIUM CHLORIDE, SODIUM GLUCONATE, SODIUM ACETATE, POTASSIUM CHLORIDE AND MAGNESIUM CHLORIDE 526; 502; 368; 37; 30 MG/100ML; MG/100ML; MG/100ML; MG/100ML; MG/100ML
INJECTION, SOLUTION INTRAVENOUS CONTINUOUS PRN
Status: DISCONTINUED | OUTPATIENT
Start: 2022-07-18 | End: 2022-07-18

## 2022-07-18 RX ORDER — FENTANYL CITRATE 50 UG/ML
25 INJECTION, SOLUTION INTRAMUSCULAR; INTRAVENOUS EVERY 5 MIN PRN
Status: DISCONTINUED | OUTPATIENT
Start: 2022-07-18 | End: 2022-07-18 | Stop reason: HOSPADM

## 2022-07-18 RX ORDER — LIDOCAINE HYDROCHLORIDE 20 MG/ML
INJECTION, SOLUTION INFILTRATION; PERINEURAL PRN
Status: DISCONTINUED | OUTPATIENT
Start: 2022-07-18 | End: 2022-07-18

## 2022-07-18 RX ORDER — ONDANSETRON 4 MG/1
4 TABLET, ORALLY DISINTEGRATING ORAL EVERY 6 HOURS PRN
Status: DISCONTINUED | OUTPATIENT
Start: 2022-07-18 | End: 2022-07-18 | Stop reason: HOSPADM

## 2022-07-18 RX ORDER — OXYCODONE HYDROCHLORIDE 5 MG/1
5 TABLET ORAL EVERY 4 HOURS PRN
Status: DISCONTINUED | OUTPATIENT
Start: 2022-07-18 | End: 2022-07-18 | Stop reason: HOSPADM

## 2022-07-18 RX ORDER — PROPOFOL 10 MG/ML
INJECTION, EMULSION INTRAVENOUS PRN
Status: DISCONTINUED | OUTPATIENT
Start: 2022-07-18 | End: 2022-07-18

## 2022-07-18 RX ORDER — MEPERIDINE HYDROCHLORIDE 25 MG/ML
12.5 INJECTION INTRAMUSCULAR; INTRAVENOUS; SUBCUTANEOUS
Status: DISCONTINUED | OUTPATIENT
Start: 2022-07-18 | End: 2022-07-18 | Stop reason: HOSPADM

## 2022-07-18 RX ORDER — FENTANYL CITRATE 50 UG/ML
INJECTION, SOLUTION INTRAMUSCULAR; INTRAVENOUS PRN
Status: DISCONTINUED | OUTPATIENT
Start: 2022-07-18 | End: 2022-07-18

## 2022-07-18 RX ORDER — NALOXONE HYDROCHLORIDE 0.4 MG/ML
0.2 INJECTION, SOLUTION INTRAMUSCULAR; INTRAVENOUS; SUBCUTANEOUS
Status: DISCONTINUED | OUTPATIENT
Start: 2022-07-18 | End: 2022-07-18 | Stop reason: HOSPADM

## 2022-07-18 RX ORDER — ONDANSETRON 2 MG/ML
4 INJECTION INTRAMUSCULAR; INTRAVENOUS EVERY 30 MIN PRN
Status: DISCONTINUED | OUTPATIENT
Start: 2022-07-18 | End: 2022-07-18 | Stop reason: HOSPADM

## 2022-07-18 RX ORDER — ONDANSETRON 2 MG/ML
INJECTION INTRAMUSCULAR; INTRAVENOUS PRN
Status: DISCONTINUED | OUTPATIENT
Start: 2022-07-18 | End: 2022-07-18

## 2022-07-18 RX ADMIN — LIDOCAINE HYDROCHLORIDE 100 MG: 20 INJECTION, SOLUTION INFILTRATION; PERINEURAL at 10:56

## 2022-07-18 RX ADMIN — FENTANYL CITRATE 50 MCG: 50 INJECTION, SOLUTION INTRAMUSCULAR; INTRAVENOUS at 10:56

## 2022-07-18 RX ADMIN — Medication 50 MG: at 10:57

## 2022-07-18 RX ADMIN — MIDAZOLAM 2 MG: 1 INJECTION INTRAMUSCULAR; INTRAVENOUS at 10:47

## 2022-07-18 RX ADMIN — ONDANSETRON 4 MG: 2 INJECTION INTRAMUSCULAR; INTRAVENOUS at 11:56

## 2022-07-18 RX ADMIN — SODIUM CHLORIDE, SODIUM GLUCONATE, SODIUM ACETATE, POTASSIUM CHLORIDE AND MAGNESIUM CHLORIDE: 526; 502; 368; 37; 30 INJECTION, SOLUTION INTRAVENOUS at 10:55

## 2022-07-18 RX ADMIN — DEXAMETHASONE SODIUM PHOSPHATE 8 MG: 4 INJECTION, SOLUTION INTRA-ARTICULAR; INTRALESIONAL; INTRAMUSCULAR; INTRAVENOUS; SOFT TISSUE at 11:10

## 2022-07-18 RX ADMIN — PROPOFOL 100 MG: 10 INJECTION, EMULSION INTRAVENOUS at 10:56

## 2022-07-18 RX ADMIN — PROPOFOL 50 MG: 10 INJECTION, EMULSION INTRAVENOUS at 11:00

## 2022-07-18 RX ADMIN — SUGAMMADEX 200 MG: 100 INJECTION, SOLUTION INTRAVENOUS at 11:59

## 2022-07-18 RX ADMIN — FENTANYL CITRATE 50 MCG: 50 INJECTION, SOLUTION INTRAMUSCULAR; INTRAVENOUS at 11:28

## 2022-07-18 ASSESSMENT — ENCOUNTER SYMPTOMS: DYSRHYTHMIAS: 1

## 2022-07-18 NOTE — OP NOTE
Upper EUS 07/18/2022 10:52 AM [address] [place] [address] [phone]     Endoscopy Department   _______________________________________________________________________________   Patient Name: Xenia Quinn      Procedure Date: 7/18/2022 10:52 AM   MRN: [id number]                       Account Number: [id number]   YOB: 1970              Admit Type: Outpatient   Age: 52                               Room: [place] [address]   Gender: Female                        Note Status: Finalized   Attending MD: MARY ANN MAYFIELD MD       Pause for the Cause: time out performed   Total Sedation Time:                     _______________________________________________________________________________       Procedure:             Upper EUS   Indications:           Suspected mass in duodenum on MRI, Patient presented                          with lower abdominal pain and CT showed a ~3 cm mass                          anterior to the distal duodenum. MRI also                          redemonstrated this.   Providers:             MARY ANN MAYFIELD MD   Referring MD:             Requesting Provider:   SHAKEEL TAVERA MD   Medicines:             General Anesthesia   Complications:         No immediate complications. Estimated blood loss:                          Minimal.   _______________________________________________________________________________   Procedure:             Pre-Anesthesia Assessment:                          - Prior to the procedure, a History and Physical was                          performed, and patient medications and allergies were                          reviewed. The patient is competent. The risks and                          benefits of the procedure and the sedation options and                          risks were discussed with the patient. All questions                          were answered and informed consent was  obtained.                          Patient identification and proposed procedure were                          verified by the physician, the nurse, the                          anesthesiologist and the anesthetist in the procedure                          room. Mental Status Examination: alert and oriented.                          Airway Examination: normal oropharyngeal airway and                          neck mobility. Respiratory Examination: clear to                          auscultation. CV Examination: normal. Prophylactic                          Antibiotics: The patient does not require prophylactic                          antibiotics. Prior Anticoagulants: The patient has                          taken no anticoagulant or antiplatelet agents. ASA                          Grade Assessment: II - A patient with mild systemic                          disease. After reviewing the risks and benefits, the                          patient was deemed in satisfactory condition to                          undergo the procedure. The anesthesia plan was to use                          general anesthesia. Immediately prior to                          administration of medications, the patient was                          re-assessed for adequacy to receive sedatives. The                          heart rate, respiratory rate, oxygen saturations,                          blood pressure, adequacy of pulmonary ventilation, and                          response to care were monitored throughout the                          procedure. The physical status of the patient was                          re-assessed after the procedure.                          After obtaining informed consent, the endoscope was                          passed under direct vision. Throughout the procedure,                          the patient's blood pressure, pulse, and oxygen                          saturations were monitored continuously. The  Endoscope                          was introduced through the mouth, and advanced to the                          second part of duodenum. The Colonoscope was                          introduced through the mouth, and advanced to the                          jejunum. The upper EUS was accomplished without                          difficulty. The patient tolerated the procedure well.                                                                                     Findings:        ENDOSCOPIC FINDING: :        Pediatric colonoscope initially used to do endoscopic exam.        The examined esophagus was endoscopically normal.        The entire examined stomach was endoscopically normal.        The examined duodenum was endoscopically normal.        There is no endoscopic evidence of mass, diverticulum, or subepithelial        lesion in the duodenum or proximal jejunum.        The examined jejunum was normal.        ENDOSONOGRAPHIC FINDING: :        Endoscopic exam with the side viewing echoendoscope was normal. The        major papilla was normal endoscopically and sonographically.        The bile duct non-dilated and measuring 4 mm in maximal diameter. The        gallbladder was absent. There were no stones or sludge seen.        An irregularly shaped lesion was found attached to or adjacent to around        the fourth portion of the duodenum seen transgastrically. The lesion was        hypoechoic. Endosonographically, the lesion appeared to originate from        the intramural wall, but the wall layer could not be determined.        Possible that this is a mesenteric lesion adjacent to the duodenum. The        lesion measured 17 mm (in maximum thickness). The lesion also measured        30 mm in diameter. The outer margins were poorly defined. An intact        interface was seen between the mass and the adjacent structures. Fine        needle biopsy was performed. Color Doppler imaging was utilized prior to         needle puncture to confirm a lack of significant vascular structures        within the needle path. Five passes were made with the 22 gauge        ultrasound biopsy needle using a transgastric approach. A visible core        of tissue was obtained. Touch preps were performed. The cellularity of        the specimen was adequate. Final cytology results are pending.        Verification of patient identification for the specimen was done by the        physician and nurse using the patient's name, birth date and medical        record number. Estimated blood loss was minimal.        The pancreatic parenchyma was normal. No masses, cysts or stones were        visualized in the pancreatic parenchyma. The main pancreatic duct was        followed from the major papilla to the body, excluding pancreas divisum.        The pancreatic duct measured 2.6 mm in the head, 2.4 mm in the body and        1.4 mm in the tail of the pancreas.        No lymph nodes were seen in the upper abdomen and mediastinum.        No masses were seen in the visualized portions of the liver. The liver        was diffusely hyperechoic.        The left adrenal appeared normal.                                                                                     Impression:            - Push enteroscopy performed with a colonoscope to the                          proximal jejunum. No intraluminal lesion or                          subepithelial seen particularly in the duodenum and                          proximal jejunum. Normal exam.                          - 30 mm x 17 mm irregularly shaped hypoechoic lesion                          seen transgastrically on the outer wall of the                          duodenum (presumably around D4) corresponding to                          CT/MRI imaging. Difficult to visualize the duodenal                          wall layers transgastrically to be certain, but lesion                          may be arising from  the muscularis propria of the                          duodenum exophytically. Alternatively, just as likely                          to be a mesenterically based lesion attached to the                          duodenum. Fine needle biopsy performed. Results                          pending.                          - Differential could include: GIST, leiomyoma,                          leiomyosarcoma, desmoid tumor.                          - No suspicious lymphadenopathy or evidence of local                          invasion of adjacent structures                          - Hepatic steatosis   Recommendation:        - Discharge patient to home (ambulatory).                          - Await cytology results.                          - Follow up with Dr. Parks in Surgical Oncology                          - Resume diet and medications today                                                                                       Silverio Ziegler MD

## 2022-07-18 NOTE — ANESTHESIA PREPROCEDURE EVALUATION
Anesthesia Pre-Procedure Evaluation    Patient: Xenia Snow   MRN: [id number] : 1970        Procedure : Procedure(s):  ENTEROSCOPY  ENDOSCOPIC ULTRASOUND, ESOPHAGOSCOPY / UPPER GASTROINTESTINAL TRACT (GI)          Past Medical History:   Diagnosis Date     History of ventricular tachycardia     on Toprol     Hypertriglyceridemia      Impaired fasting glucose      Obesity (BMI 30-39.9)       Past Surgical History:   Procedure Laterality Date     DILATION AND CURETTAGE       DILATION AND EVACUATION       LAPAROSCOPIC APPENDECTOMY  2018     LAPAROSCOPIC CHOLECYSTECTOMY  2017      Allergies   Allergen Reactions     Amitiza [Lubiprostone] GI Disturbance      Social History     Tobacco Use     Smoking status: Never Smoker     Smokeless tobacco: Never Used   Substance Use Topics     Alcohol use: Never      Wt Readings from Last 1 Encounters:   07/10/22 88.9 kg (196 lb)        Anesthesia Evaluation   Pt has had prior anesthetic. Type: General and MAC.        ROS/MED HX  ENT/Pulmonary:       Neurologic:       Cardiovascular:     (+) Dyslipidemia -----dysrhythmias, Other,     METS/Exercise Tolerance:     Hematologic:       Musculoskeletal:       GI/Hepatic:     (+) appendicitis, cholecystitis/cholelithiasis,     Renal/Genitourinary:       Endo:     (+) Obesity,     Psychiatric/Substance Use:       Infectious Disease:       Malignancy:       Other:            Physical Exam    Airway        Mallampati: II   TM distance: > 3 FB   Neck ROM: full   Mouth opening: > 3 cm    Respiratory Devices and Support         Dental  no notable dental history         Cardiovascular   cardiovascular exam normal       Rhythm and rate: regular and normal     Pulmonary   pulmonary exam normal        breath sounds clear to auscultation           OUTSIDE LABS:  CBC:   Lab Results   Component Value Date    WBC 8.7 07/10/2022    WBC 9.2 2022    HGB 11.7 07/10/2022    HGB 11.8 2022    HCT 36.1 07/10/2022    HCT 34.9 (L)  06/27/2022     07/10/2022     06/27/2022     BMP:   Lab Results   Component Value Date     06/27/2022     01/25/2022    POTASSIUM 4.2 06/27/2022    POTASSIUM 4.3 01/25/2022    CHLORIDE 108 06/27/2022    CHLORIDE 108 01/25/2022    CO2 27 06/27/2022    CO2 25 01/25/2022    BUN 14 06/27/2022    BUN 12 01/25/2022    CR 0.97 06/27/2022    CR 0.93 01/25/2022     (H) 06/27/2022     (H) 01/25/2022     COAGS:   Lab Results   Component Value Date    PTT 33 01/23/2020    INR 0.90 01/23/2020     POC:   Lab Results   Component Value Date    BGM 95 01/23/2020     HEPATIC:   Lab Results   Component Value Date    ALBUMIN 3.7 06/27/2022    PROTTOTAL 7.1 06/27/2022    ALT 24 06/27/2022    AST 17 06/27/2022    ALKPHOS 64 06/27/2022    BILITOTAL 0.4 06/27/2022     OTHER:   Lab Results   Component Value Date    A1C 5.6 01/25/2022    AIYANA 9.4 06/27/2022    TSH 2.24 01/02/2020       Anesthesia Plan    ASA Status:  3      Anesthesia Type: General.     - Airway: ETT   Induction: Intravenous, Propofol.   Maintenance: Balanced.   Techniques and Equipment:     - Airway: Video-Laryngoscope         Consents    Anesthesia Plan(s) and associated risks, benefits, and realistic alternatives discussed. Questions answered and patient/representative(s) expressed understanding.     - Discussed: Risks, Benefits and Alternatives for the PROCEDURE were discussed     - Discussed with:  Patient      - Extended Intubation/Ventilatory Support Discussed: No.      - Patient is DNR/DNI Status: No    Use of blood products discussed: No .     Postoperative Care    Pain management: IV analgesics.   PONV prophylaxis: Ondansetron (or other 5HT-3)     Comments:                Arleth Stroud MD

## 2022-07-18 NOTE — ANESTHESIA CARE TRANSFER NOTE
Patient: Xenia Snow    Procedure: Procedure(s):  ENTEROSCOPY  ESOPHAGOGASTRODUODENOSCOPY, WITH FINE NEEDLE ASPIRATION BIOPSY, WITH ENDOSCOPIC ULTRASOUND GUIDANCE       Diagnosis: Duodenal mass [K31.89]  Diagnosis Additional Information: No value filed.    Anesthesia Type:   General     Note:    Oropharynx: oropharynx clear of all foreign objects and spontaneously breathing  Level of Consciousness: awake  Oxygen Supplementation: face mask  Level of Supplemental Oxygen (L/min / FiO2): 10  Independent Airway: airway patency satisfactory and stable  Dentition: dentition unchanged  Vital Signs Stable: post-procedure vital signs reviewed and stable  Report to RN Given: handoff report given  Patient transferred to: PACU    Handoff Report: Identifed the Patient, Identified the Reponsible Provider, Reviewed the pertinent medical history, Discussed the surgical course, Reviewed Intra-OP anesthesia mangement and issues during anesthesia, Set expectations for post-procedure period and Allowed opportunity for questions and acknowledgement of understanding      Vitals:  Vitals Value Taken Time    84    Temp     Pulse 90    Resp 16    SpO2 100        Electronically Signed By: Jeannine Bee CRNA, SHINE ESTRADA  July 18, 2022  12:13 PM

## 2022-07-18 NOTE — DISCHARGE INSTRUCTIONS
[facility]   Adult Discharge Orders & Instructions     For 24 hours after surgery    Get plenty of rest.  A responsible adult must stay with you for at least 24 hours after you leave the hospital.   Do not drive or use heavy equipment.  If you have weakness or tingling, don't drive or use heavy equipment until this feeling goes away.  Do not drink alcohol.  Avoid strenuous or risky activities.  Ask for help when climbing stairs.   You may feel lightheaded.  IF so, sit for a few minutes before standing.  Have someone help you get up.   If you have nausea (feel sick to your stomach): Drink only clear liquids such as apple juice, ginger ale, broth or 7-Up.  Rest may also help.  Be sure to drink enough fluids.  Move to a regular diet as you feel able.  You may have a slight fever. Call the doctor if your fever is over 100 .4F (taken under the tongue) or lasts longer than 24 hours.  You may have a dry mouth, a sore throat, muscle aches or trouble sleeping.  These should go away after 24 hours.  Do not make important or legal decisions.   Call your doctor for any of the followin.  Signs of infection (fever, growing tenderness at the surgery site, a large amount of drainage or bleeding, severe pain, foul-smelling drainage, redness, swelling).    2. It has been over 8 to 10 hours since surgery and you are still not able to urinate (pass water).    3.  Headache for over 24 hours.      To contact a doctor, call [facility] at [phone]     After hours, call [phone] and ask for the resident on-call for GASTROENTEROLOGY

## 2022-07-18 NOTE — ANESTHESIA PROCEDURE NOTES
Airway       Patient location during procedure: OR       Procedure Start/Stop Times: 7/18/2022 11:04 AM  Staff -        CRNA: Cristal Jolley APRN CRNA       Performed By: CRNA  Consent for Airway        Urgency: elective  Indications and Patient Condition       Indications for airway management: jordyn-procedural       Induction type:intravenous       Mask difficulty assessment: 1 - vent by mask    Final Airway Details       Final airway type: endotracheal airway       Successful airway: ETT - single  Endotracheal Airway Details        ETT size (mm): 7.0       Cuffed: yes       Successful intubation technique: video laryngoscopy       VL Blade Size: MAC D Blade       Grade View of Cords: 1       Adjucts: stylet (Glidescope stylet)       Position: Right       Measured from: gums/teeth       Secured at (cm): 21       Bite block used: None    Post intubation assessment        Placement verified by: capnometry, equal breath sounds and chest rise        Number of attempts at approach: 2       Secured with: pink tape       Ease of procedure: easy       Dentition: Intact and Unchanged    Medication(s) Administered   Medication Administration Time: 7/18/2022 11:04 AM    Additional Comments       Anterior! First attempt with CMAC 3 blade and large bend on ETT - Gr 1 view but unable to manipulate tube to glottic opening, attempted to pass bougie - but again unable to manipulate bougie anterior enough. Second attempt with CMAC D blade - Gr 1 view with glidescope stylet with additional anterior bend on stylet.

## 2022-07-18 NOTE — ANESTHESIA POSTPROCEDURE EVALUATION
Patient: Xenia Snow    Procedure: Procedure(s):  ENTEROSCOPY  ESOPHAGOGASTRODUODENOSCOPY, WITH FINE NEEDLE ASPIRATION BIOPSY, WITH ENDOSCOPIC ULTRASOUND GUIDANCE       Anesthesia Type:  General    Note:  Disposition: Outpatient   Postop Pain Control: Uneventful            Sign Out: Well controlled pain   PONV: No   Neuro/Psych: Uneventful            Sign Out: Acceptable/Baseline neuro status   Airway/Respiratory: Uneventful            Sign Out: Acceptable/Baseline resp. status   CV/Hemodynamics: Uneventful            Sign Out: Acceptable CV status; No obvious hypovolemia; No obvious fluid overload   Other NRE: NONE   DID A NON-ROUTINE EVENT OCCUR? No           Last vitals:  Vitals Value Taken Time   /75 07/18/22 1230   Temp     Pulse 83 07/18/22 1232   Resp 17 07/18/22 1232   SpO2 98 % 07/18/22 1232   Vitals shown include unvalidated device data.    Electronically Signed By: Taurus Aldrich MD  July 18, 2022  12:34 PM

## 2022-07-20 LAB
PATH REPORT.COMMENTS IMP SPEC: ABNORMAL
PATH REPORT.COMMENTS IMP SPEC: YES
PATH REPORT.FINAL DX SPEC: ABNORMAL
PATH REPORT.GROSS SPEC: ABNORMAL
PATH REPORT.MICROSCOPIC SPEC OTHER STN: ABNORMAL
PATH REPORT.RELEVANT HX SPEC: ABNORMAL

## 2022-07-20 PROCEDURE — 88342 IMHCHEM/IMCYTCHM 1ST ANTB: CPT | Mod: 26 | Performed by: PATHOLOGY

## 2022-07-20 PROCEDURE — 88305 TISSUE EXAM BY PATHOLOGIST: CPT | Mod: 26 | Performed by: PATHOLOGY

## 2022-07-20 PROCEDURE — 88173 CYTOPATH EVAL FNA REPORT: CPT | Mod: 26 | Performed by: PATHOLOGY

## 2022-07-20 PROCEDURE — 88341 IMHCHEM/IMCYTCHM EA ADD ANTB: CPT | Mod: 26 | Performed by: PATHOLOGY

## 2022-07-20 PROCEDURE — 88172 CYTP DX EVAL FNA 1ST EA SITE: CPT | Mod: 26 | Performed by: PATHOLOGY

## 2022-07-21 NOTE — RESULT ENCOUNTER NOTE
I called the patient to review the pathology results from her recent EUS. See report for details. 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 Patient has follow up with Dr. Parks later this month. Defer to him regarding further management. Xenia reports fairly significant problems with constipation follow the procedure despite walking and increased water intake. Likely related to anesthetic medications. At this point, given the discomfort I recommended trying a FLEET enema and following that with a few days of Miralax to return to normal bowel habits. I instructed her to contact us if she is still having issues.     Silverio Ziegler MD  [facility]  Division of Gastroenterology and Hepatology  [facility] [address] [place], [place] [address]

## 2022-07-28 ENCOUNTER — VIRTUAL VISIT (OUTPATIENT)
Dept: RADIATION THERAPY | Facility: OUTPATIENT CENTER | Age: 52
End: 2022-07-28
Attending: INTERNAL MEDICINE
Payer: COMMERCIAL

## 2022-07-28 DIAGNOSIS — K63.89 MESENTERIC MASS: ICD-10-CM

## 2022-07-28 RX ORDER — IBUPROFEN 200 MG
400 TABLET ORAL EVERY 4 HOURS PRN
COMMUNITY

## 2022-07-28 RX ORDER — HYDROXYZINE HYDROCHLORIDE 10 MG/5ML
4 SYRUP ORAL EVERY 6 HOURS PRN
COMMUNITY

## 2022-07-28 NOTE — NURSING NOTE
"Xenia is a 52 year old who is being evaluated via a billable video visit.      How would you like to obtain your AVS? MyChart  If the video visit is dropped, the invitation should be resent by: Text to cell phone: [phone]  Will anyone else be joining your video visit? No        Video-Visit Details    Video Start Time: 9:40 AM    Type of service:  Video Visit    Video End Time:Per MD    Originating Location (pt. 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    Weight as of 7/18/22: 88.1 kg (194 lb 5 oz). There is no height or weight on file to calculate BSA.  Data Unavailable Comment: Data Unavailable   Patient's last menstrual period was 06/27/2022.  Allergies reviewed: Yes  Medications reviewed: Yes    Medications: Medication refills not needed today.  Pharmacy name entered into [employer]: [id number] - [place], [place] - [address] AT [facility] [address]    Clinical concerns: New Video Surgical Oncology Consult- spindle cell duodenal mass Dr. Beck was notified.      Kelsey Plata RN              "

## 2022-07-28 NOTE — LETTER
7/28/2022         RE: Xenia Snow  [address] [facility] [address]        Dear Colleague,    Thank you for referring your patient, Xenia Snow, to the [facility]. Please see a copy of my visit note below.    VIDEO VISIT    HISTORY OF PRESENT ILLNESS:  Today I had a video visit with Xenia Snow to discuss management and diagnosis of her desmoid tumor.  Xenia has had a long history of abdominal pain.  She had an appendectomy in the past.  She has had a cholecystectomy in the past.  She has intermittent abdominal discomfort.  She had more abdominal discomfort this summer and underwent a CT scan of the abdomen and pelvis on 06/27/2022.  This demonstrated a roughly 3 cm ill-defined mass in her left abdomen near her duodenum near the fourth portion of her duodenum.  She then had an MRI, which confirmed an ill-defined 3 cm mass near her fourth portion of her duodenum.  She then underwent an endoscopic ultrasound, which demonstrated a 3 cm mass in this area and a needle biopsy was consistent with a desmoid tumor.  Her imaging and pathology were all consistent with a desmoid tumor.  She is now here to talk about management options.  She does not really have any upper abdominal pain. Most of her pain has been lower abdominal.  She does have chronic nausea.  She has not had any obstructive symptoms.  She denies any vomiting.  She does not have any family history of desmoid tumors or familial colon cancer.    IMPRESSION:  Asymptomatic desmoid tumor.    PLAN:  I have talked to her about the diagnosis and natural history of this desmoid tumor.  She did have a CT scan of the abdomen in 10/2021.  I reviewed this.  It does show the desmoid tumor, but it was not commented upon in the notes and looks unchanged.  I did not recommend surgical resection.  I did not recommend any medical treatment at this time. Surgical resection can be a difficult operation and these tumors have a very  high rate of regrowth even after surgery.  I have recommended an MRI  at 1 year.  If this is unchanged, then she would not require any further surveillance unless she develops new symptoms.      The video started at 943 and ended at 956.  The total time of the visit was 30 minutes, which included reviewing her imaging and the video consult.    Gustabo Beck MD    cc:  Carol Yung MD  [facility]  [address]  [address]

## 2022-07-28 NOTE — PROGRESS NOTES
VIDEO VISIT    HISTORY OF PRESENT ILLNESS:  Today I had a video visit with Xenia Snow to discuss management and diagnosis of her desmoid tumor.  Xenia has had a long history of abdominal pain.  She had an appendectomy in the past.  She has had a cholecystectomy in the past.  She has intermittent abdominal discomfort.  She had more abdominal discomfort this summer and underwent a CT scan of the abdomen and pelvis on 06/27/2022.  This demonstrated a roughly 3 cm ill-defined mass in her left abdomen near her duodenum near the fourth portion of her duodenum.  She then had an MRI, which confirmed an ill-defined 3 cm mass near her fourth portion of her duodenum.  She then underwent an endoscopic ultrasound, which demonstrated a 3 cm mass in this area and a needle biopsy was consistent with a desmoid tumor.  Her imaging and pathology were all consistent with a desmoid tumor.  She is now here to talk about management options.  She does not really have any upper abdominal pain. Most of her pain has been lower abdominal.  She does have chronic nausea.  She has not had any obstructive symptoms.  She denies any vomiting.  She does not have any family history of desmoid tumors or familial colon cancer.    IMPRESSION:  Asymptomatic desmoid tumor.    PLAN:  I have talked to her about the diagnosis and natural history of this desmoid tumor.  She did have a CT scan of the abdomen in 10/2021.  I reviewed this.  It does show the desmoid tumor, but it was not commented upon in the notes and looks unchanged.  I did not recommend surgical resection.  I did not recommend any medical treatment at this time. Surgical resection can be a difficult operation and these tumors have a very high rate of regrowth even after surgery.  I have recommended an MRI  at 1 year.  If this is unchanged, then she would not require any further surveillance unless she develops new symptoms.      The video started at 776 and ended at 774.  The total  time of the visit was 30 minutes, which included reviewing her imaging and the video consult.    Gustabo Beck MD    cc:  Carol Yung MD  [address]  [address]

## 2022-08-10 RX ORDER — BISACODYL 5 MG
TABLET, DELAYED RELEASE (ENTERIC COATED) ORAL
Qty: 4 TABLET | Refills: 0 | Status: SHIPPED | OUTPATIENT
Start: 2022-08-10 | End: 2023-05-02

## 2022-08-25 ENCOUNTER — HOSPITAL ENCOUNTER (OUTPATIENT)
Facility: CLINIC | Age: 52
Discharge: HOME | End: 2022-08-25
Attending: INTERNAL MEDICINE | Admitting: INTERNAL MEDICINE
Payer: COMMERCIAL

## 2022-08-25 VITALS
HEART RATE: 78 BPM | TEMPERATURE: 97.5 F | BODY MASS INDEX: 30.86 KG/M2 | SYSTOLIC BLOOD PRESSURE: 120 MMHG | OXYGEN SATURATION: 96 % | RESPIRATION RATE: 16 BRPM | HEIGHT: 66 IN | WEIGHT: 192 LBS | DIASTOLIC BLOOD PRESSURE: 75 MMHG

## 2022-08-25 DIAGNOSIS — R73.01 IMPAIRED FASTING GLUCOSE: Primary | ICD-10-CM

## 2022-08-25 DIAGNOSIS — Z12.11 SCREENING FOR COLON CANCER: ICD-10-CM

## 2022-08-25 LAB — COLONOSCOPY: NORMAL

## 2022-08-25 PROCEDURE — 45385 COLONOSCOPY W/LESION REMOVAL: CPT | Performed by: INTERNAL MEDICINE

## 2022-08-25 PROCEDURE — 99153 MOD SED SAME PHYS/QHP EA: CPT | Performed by: INTERNAL MEDICINE

## 2022-08-25 PROCEDURE — 250N000011 HC RX IP 250 OP 636: Performed by: INTERNAL MEDICINE

## 2022-08-25 PROCEDURE — G0500 MOD SEDAT ENDO SERVICE >5YRS: HCPCS | Mod: PT | Performed by: INTERNAL MEDICINE

## 2022-08-25 PROCEDURE — 88305 TISSUE EXAM BY PATHOLOGIST: CPT | Mod: TC | Performed by: INTERNAL MEDICINE

## 2022-08-25 RX ORDER — ATROPINE SULFATE 0.1 MG/ML
1 INJECTION INTRAVENOUS
Status: DISCONTINUED | OUTPATIENT
Start: 2022-08-25 | End: 2022-08-25 | Stop reason: HOSPADM

## 2022-08-25 RX ORDER — PROCHLORPERAZINE MALEATE 10 MG
10 TABLET ORAL EVERY 6 HOURS PRN
Status: DISCONTINUED | OUTPATIENT
Start: 2022-08-25 | End: 2022-08-25 | Stop reason: HOSPADM

## 2022-08-25 RX ORDER — FLUMAZENIL 0.1 MG/ML
0.2 INJECTION, SOLUTION INTRAVENOUS
Status: DISCONTINUED | OUTPATIENT
Start: 2022-08-25 | End: 2022-08-25 | Stop reason: HOSPADM

## 2022-08-25 RX ORDER — NALOXONE HYDROCHLORIDE 0.4 MG/ML
0.4 INJECTION, SOLUTION INTRAMUSCULAR; INTRAVENOUS; SUBCUTANEOUS
Status: DISCONTINUED | OUTPATIENT
Start: 2022-08-25 | End: 2022-08-25 | Stop reason: HOSPADM

## 2022-08-25 RX ORDER — ONDANSETRON 2 MG/ML
4 INJECTION INTRAMUSCULAR; INTRAVENOUS
Status: DISCONTINUED | OUTPATIENT
Start: 2022-08-25 | End: 2022-08-25 | Stop reason: HOSPADM

## 2022-08-25 RX ORDER — LIDOCAINE 40 MG/G
CREAM TOPICAL
Status: DISCONTINUED | OUTPATIENT
Start: 2022-08-25 | End: 2022-08-25 | Stop reason: HOSPADM

## 2022-08-25 RX ORDER — FENTANYL CITRATE 0.05 MG/ML
50-100 INJECTION, SOLUTION INTRAMUSCULAR; INTRAVENOUS EVERY 5 MIN PRN
Status: DISCONTINUED | OUTPATIENT
Start: 2022-08-25 | End: 2022-08-25 | Stop reason: HOSPADM

## 2022-08-25 RX ORDER — ONDANSETRON 4 MG/1
4 TABLET, ORALLY DISINTEGRATING ORAL EVERY 6 HOURS PRN
Status: DISCONTINUED | OUTPATIENT
Start: 2022-08-25 | End: 2022-08-25 | Stop reason: HOSPADM

## 2022-08-25 RX ORDER — SIMETHICONE 40MG/0.6ML
133 SUSPENSION, DROPS(FINAL DOSAGE FORM)(ML) ORAL
Status: DISCONTINUED | OUTPATIENT
Start: 2022-08-25 | End: 2022-08-25 | Stop reason: HOSPADM

## 2022-08-25 RX ORDER — ONDANSETRON 2 MG/ML
4 INJECTION INTRAMUSCULAR; INTRAVENOUS EVERY 6 HOURS PRN
Status: DISCONTINUED | OUTPATIENT
Start: 2022-08-25 | End: 2022-08-25 | Stop reason: HOSPADM

## 2022-08-25 RX ORDER — NALOXONE HYDROCHLORIDE 0.4 MG/ML
0.2 INJECTION, SOLUTION INTRAMUSCULAR; INTRAVENOUS; SUBCUTANEOUS
Status: DISCONTINUED | OUTPATIENT
Start: 2022-08-25 | End: 2022-08-25 | Stop reason: HOSPADM

## 2022-08-25 RX ORDER — DIPHENHYDRAMINE HYDROCHLORIDE 50 MG/ML
25-50 INJECTION INTRAMUSCULAR; INTRAVENOUS
Status: DISCONTINUED | OUTPATIENT
Start: 2022-08-25 | End: 2022-08-25 | Stop reason: HOSPADM

## 2022-08-25 RX ORDER — EPINEPHRINE 1 MG/ML
0.1 INJECTION, SOLUTION INTRAMUSCULAR; SUBCUTANEOUS
Status: DISCONTINUED | OUTPATIENT
Start: 2022-08-25 | End: 2022-08-25 | Stop reason: HOSPADM

## 2022-08-25 RX ADMIN — MIDAZOLAM HYDROCHLORIDE 2 MG: 1 INJECTION, SOLUTION INTRAMUSCULAR; INTRAVENOUS at 13:01

## 2022-08-25 RX ADMIN — FENTANYL CITRATE 50 MCG: 50 INJECTION INTRAMUSCULAR; INTRAVENOUS at 13:11

## 2022-08-25 RX ADMIN — FENTANYL CITRATE 50 MCG: 50 INJECTION INTRAMUSCULAR; INTRAVENOUS at 13:06

## 2022-08-25 RX ADMIN — FENTANYL CITRATE 50 MCG: 50 INJECTION INTRAMUSCULAR; INTRAVENOUS at 13:14

## 2022-08-25 RX ADMIN — MIDAZOLAM HYDROCHLORIDE 1 MG: 1 INJECTION, SOLUTION INTRAMUSCULAR; INTRAVENOUS at 13:14

## 2022-08-25 RX ADMIN — MIDAZOLAM HYDROCHLORIDE 1 MG: 1 INJECTION, SOLUTION INTRAMUSCULAR; INTRAVENOUS at 13:17

## 2022-08-25 RX ADMIN — MIDAZOLAM HYDROCHLORIDE 1 MG: 1 INJECTION, SOLUTION INTRAMUSCULAR; INTRAVENOUS at 13:06

## 2022-08-25 RX ADMIN — MIDAZOLAM HYDROCHLORIDE 1 MG: 1 INJECTION, SOLUTION INTRAMUSCULAR; INTRAVENOUS at 13:09

## 2022-08-25 RX ADMIN — FENTANYL CITRATE 100 MCG: 50 INJECTION INTRAMUSCULAR; INTRAVENOUS at 13:01

## 2022-08-25 ASSESSMENT — ACTIVITIES OF DAILY LIVING (ADL): ADLS_ACUITY_SCORE: 35

## 2022-08-25 NOTE — LETTER
[facility] ENDOSCOPY [place]    [address] [place] [address]  Phone: [phone]  Fax: [phone]       August 10, 2022                      Xenia Snow  [address] [facility] [address]          Dear Xenia Snow,        Thank you for choosing [facility]. You are scheduled for the following service(s).   Please be aware that coverage of these services is subject to the terms and limitations of your health insurance plan.  Call member services at your health plan with any benefit or coverage questions.    Scheduled Endoscopy Procedure(s): Colonoscopy     Date: 8/25/22  Scheduled MD: Dr. Tucker Sánchez          Arrival Time:   12:15 PM  *Enter and check in at the Main Hospital Entrance  Procedure Time:  01:00 PM       Location:   [facility]        Endoscopy Department, First Floor *         [address] [place], [place] [address] [phone] () or [phone] to reschedule    STANDARD Golytely (Colyte, Nulytely)  Prep Instructions for your Colonoscopy  Please read these instructions carefully at least 7 days prior to your colonoscopy procedure. Be sure to follow all directions completely. The inside of your colon must be clean to allow for a complete examination for the presence of any growths, polyps, and/or abnormalities, as well as their biopsy or removal. A number of tips are included in order to make this part of the procedure as comfortable as possible.    Getting ready:     A nurse will call you approximately 1 week before your exam to prescribe your bowel prep and review the prep instructions with you.    You must arrange for an adult to drive you home after your exam. Your colonoscopy cannot be done unless you have a ride. If you need to use public transportation, someone must ride with you and stay with you for a minimum of 6-24 hours.    Check with your insurance company to be sure they  will cover this exam.    7 days before the exam:    Talk to your doctor:  If you take blood-thinners (such as Coumadin, Plavix, Xarelto), your prescription or schedule may need to change before the test.    Stop taking fiber supplements, multi-vitamins with iron, and medicines that contain iron.    Continue taking prescribed aspirin; talk to your prescribing doctor with any concerns.    Stop eating corn, nuts and foods with seeds.  These can stay in the colon for days.    If you have diabetes:  Ask to have your exam early in the morning.  Also, ask your doctor if you should change your diet or medicines.    3 days before the exam:    Begin a low-fiber diet: No raw fruits or vegetables, whole wheat, seeds, nuts, popcorn or other high-fiber foods (see list on page). No binding agents: (bran, Metamucil, Fibercon) and no Olestra (a fat substitute).    One day before the exam:    You can have a light, low-fiber breakfast. But drink only clear liquids after 9 a.m. (see list below). Drink at least 8 to 10 full glasses of clear liquids during the day.     Fill the jug that contains the Golytely powder with warm water. Cover and shake until well mixed. Use a full gallon of water. Chill for 3 hours, but do not add ice.    You will start drinking half of the Golytely solution at 6 p.m. You should drink the other half about 6 hours before your exam. So, the timing of Step 2 will depend on your exam time.     After you start drinking the solution, stay near a toilet. You may have watery stools (diarrhea), mild cramping, bloating , and nausea.     Step One:  o At 3 p.m., take 2 tablets of Dulcolax (bisacodyl).  o At 6 p.m. start drinking the Golytely solution. Drink an 8-ounce glass every 15 minutes until the jug is half empty. Drink each glass quickly.     Step Two:   If you arrive before 11 AM:  At 11 p.m. on the day before your exam:    Take 2 Dulcolax (Bisacodyl) tablets.     Start drinking the other half of the Golytely jug.  Drink one 8-ounce glass every 15 minutes until the jug is empty. Drink each glass quickly.  If you arrive after 11 AM:  At 6 AM on the day of the exam:    Take 2 tablets of Dulcolax (Bisacodyl).     Drink the other half of the Golytyel jug.     Drink one 8-ounce glass every 15 minutes until the jug is empty.     Drink each glass quickly.     You should finish the prep 4 hours before the exam.      Day of exam:      You may drink clear liquids only up until 4 hours before your exam.    Do not drink anything 4 hours before your exam, not even water. (If you must take medicine, you can take it with a sip of water.)     Do not chew or swallow anything including water or gum for at least 4 hours before your exam. If you do, we may cancel the exam for your safety.     Do not take diabetes medicine by mouth until after your exam.    If you have asthma, bring your inhaler.    Arrive with an adult who will take you home after your test. The medicine used will make you sleepy. If you don't have someone to take you home, we will cancel your test.       CLEAR LIQUIDS   You may have:    Water, tea, coffee (no milk or cream)    Soda pop, Gatorade (not red or purple)    Jell-O, Popsicles (no milk or fruit pieces - not red or purple)    Fat-free soup broth or bouillon    Plain hard candy, such as clear life savers (not red or purple)    Clear juices and fruit-flavored drinks, such as apple juice, white grape juice, Hi-C, and Florentino-Aid (not red or purple)   Do not have:    Milk or milk products such as ice cream, malts or shakes, or coffee creamer    Red or purple drinks of any kind such as cranberry juice or grape juice. Avoid red or purple Jell-O, Popsicles, Florentino-Aid, sorbet, sherbet and candy    Juices with pulp such as orange, grapefruit, pineapple or tomato juice    Cream soups of any kind    Alcohol and beer    Protein drinks or protein powder     LOW FIBER DIET   You may have:      Starches: White bread, rolls, biscuits,  croissants, Susan toast, white flour tortillas, waffles, pancakes, Honduran toast; white rice, noodles, pasta, macaroni; cooked and peeled potatoes; plain crackers, saltines; cooked farina or cream of rice; puffed rice, corn flakes, Rice Krispies, Special K     Vegetables: tender cooked and canned, vegetable broths    Fruits and fruit juices: Strained fruit juice, canned fruit without seeds or skin (not pineapple), applesauce, pear sauce, ripe bananas, melons (not watermelon)     Milk products: Milk (plain or flavored), cheese, cottage cheese, yogurt (no berries), custard, ice cream      Proteins: Tender, well-cooked ground beef, lamb, veal, ham, pork, chicken, turkey, fish or organ meats; eggs; creamy peanut butter     Fats and condiments:  Margarine, butter, oils, mayonnaise, sour cream, salad dressing, plain gravy; spices, cooked herbs; sugar, clear jelly, honey, syrup     Snacks, sweets and drinks: Pretzels, hard candy; plain cakes and cookies (no nuts or seeds); gelatin, plain pudding, sherbet, Popsicles; coffee, tea, carbonated ( fizzy ) drinks Do not have:      Starches: Breads or rolls that contain nuts, seeds or fruit; whole wheat or whole grain breads that contain more than 1 gram of fiber per slice; cornbread; corn or whole wheat tortillas; potatoes with skin; brown rice, wild rice, kasha (buckwheat), and oatmeal     Vegetables: Any raw or steamed vegetables; vegetables with seeds; corn in any form     Fruits and fruit juices: Prunes, prune juice, raisins and other dried fruits, berries and other fruits with seeds, canned pineapple juices with pulp such as orange, grapefruit, pineapple or tomato juice    Milk products: Any yogurt with nuts, seeds or berries     Proteins: Tough, fibrous meats with gristle; cooked dried beans, peas or lentils; crunchy peanut butter    Fats and condiments: Pickles, olives, relish, horseradish; jam, marmalade, preserves     Snacks, sweets and drinks: Popcorn, nuts, seeds,  granola, coconut, candies made with nuts or seeds; all desserts that contain nuts, seeds, raisins and other dried fruits, coconut, whole grains or bran.        FAQ:      How do you know if your colon is cleaned out?   o After completing the bowel prep, your bowel movements should be all liquid and yellow. Your bowel movements will look similar to urine in the toilet. If there are pieces of stool (poop) in the toilet, or if you can't see to the bottom of the toilet, please call our office for advice. Call [phone] and ask to speak with a nurse.     Why do you need a responsible  to take you home and stay with you?  o We require a responsible adult to take you home for your safety. The sedation medicines used to relax you during the procedure can impair your judgement and reaction time, make you forgetful and possible a little unsteady. Do not drive, make any important decisions, or sign any legal documents for 24 hours after your procedure.     It is normal to feel bloated and gassy after your procedure. Walking will help move the air through your colon. You can take non-aspirin pain relievers that contain acetaminophen (Tylenol).     When can you eat after your procedure?  o You may resume your normal diet when you feel ready, unless advised otherwise by the doctor performing your procedure. Do not drink alcohol for 24 hours after your procedure.     You many resume normal activities (work, exercise, etc.) after 24 hours.     When will you get test results?  o You should have your procedure results and any lab results (if applicable) by letter, [employer] message, or phone call within 2 weeks. If you have any questions, please call the doctor that referred you for the procedure.       Thank you for choosing [facility], for your procedure. If you are sent a survey regarding your care, please take the time to complete the questionnaire. We value your feedback!             Updated: 6/22/2022

## 2022-08-25 NOTE — H&P
Pre-Endoscopy History and Physical     Xenia Snow MRN# [id number]   YOB: 1970 Age: 52 year old     Date of Procedure: 8/25/2022  Primary care provider: Carol Yung  Type of Endoscopy: Colonoscopy with possible biopsy, possible polypectomy  Reason for Procedure: screen  Type of Anesthesia Anticipated: Conscious Sedation    HPI:    Xenia is a 52 year old female who will be undergoing the above procedure.      A history and physical has been performed. The patient's medications and allergies have been reviewed. The risks and benefits of the procedure and the sedation options and risks were discussed with the patient.  All questions were answered and informed consent was obtained.      She denies a personal or family history of anesthesia complications or bleeding disorders.     Patient Active Problem List   Diagnosis     Impaired fasting glucose     History of ventricular tachycardia     Obesity (BMI 30-39.9)     Hypertriglyceridemia        Past Medical History:   Diagnosis Date     History of ventricular tachycardia     on Toprol     Hypertriglyceridemia      Impaired fasting glucose      Obesity (BMI 30-39.9)         Past Surgical History:   Procedure Laterality Date     DILATION AND CURETTAGE       DILATION AND EVACUATION       ENTEROSCOPY SMALL BOWEL N/A 7/18/2022    Procedure: ENTEROSCOPY;  Surgeon: Silverio Ziegler MD;  Location: [facility] OR     ESOPHAGOSCOPY, GASTROSCOPY, DUODENOSCOPY (EGD), COMBINED N/A 7/18/2022    Procedure: ESOPHAGOGASTRODUODENOSCOPY, WITH FINE NEEDLE ASPIRATION BIOPSY, WITH ENDOSCOPIC ULTRASOUND GUIDANCE;  Surgeon: Silverio Ziegler MD;  Location: [facility] OR     LAPAROSCOPIC APPENDECTOMY  2018     LAPAROSCOPIC CHOLECYSTECTOMY  2017       Social History     Tobacco Use     Smoking status: Never Smoker     Smokeless tobacco: Never Used   Substance Use Topics     Alcohol use: Never       Family History   Problem Relation Age of Onset     Hypertension Mother       Hyperlipidemia Mother      Breast Cancer Mother         post-menopausal     Skin Cancer Father         unknown types     Pacemaker Father      Valvular heart disease Father      Coronary Artery Disease Maternal Grandmother         s/p CABG later in life     Myocardial Infarction Maternal Grandfather         later in life     Breast Cancer Paternal Grandmother         post-menopausal     Myocardial Infarction Maternal Uncle         later in life     Rheumatoid Arthritis Paternal Aunt      Diabetes No family hx of      Coronary Artery Disease Early Onset No family hx of      Cerebrovascular Disease No family hx of      Ovarian Cancer No family hx of      Colon Cancer No family hx of        Prior to Admission medications    Medication Sig Start Date End Date Taking? Authorizing Provider   bisacodyl (DULCOLAX) 5 MG EC tablet Take two (2) tablet at 4 pm the day before your procedure.  If your procedure is before 11 am, take two (2) additional tablets at 8 pm.  If your procedure is after 11 am, take two (2) additional tablets at 6 am. For additional instructions refer to your colonoscopy prep instructions. 8/10/22  Yes Tucker Sánchez MD   polyethylene glycol (GOLYTELY) 236 g suspension The night before the exam: at 6 pm start drinking an 8-ounce glass every 15 minutes until the jug is half empty (about 8 glasses). Day of exam: 6 hours before your exam check-in Drink the other half of the jug. You should finish the prep 4 hours before the exam. For additional instructions refer to your colonoscopy prep instructions. 8/10/22  Yes Tucker Sánchez MD   albuterol (PROAIR HFA/PROVENTIL HFA/VENTOLIN HFA) 108 (90 Base) MCG/ACT inhaler Inhale 1-2 puffs into the lungs every 6 hours for 5 days 3/21/22 3/26/22  Sabrina Verma PA-C   chlorpheniramine (CHLOR-TRIMETON) 4 MG tablet Take 4 mg by mouth every 6 hours as needed for allergies or rhinitis    Reported, Patient   ibuprofen (ADVIL/MOTRIN) 200 MG tablet Take 400 mg by mouth  "every 4 hours as needed for mild pain    Reported, Patient   loratadine (CLARITIN) 10 MG tablet Take 1 tablet (10 mg) by mouth daily 1/3/20   Carol Yung MD   metoprolol succinate ER (TOPROL-XL) 25 MG 24 hr tablet Take 1 tablet (25 mg) by mouth daily 1/11/22   Carol Yung MD       Allergies   Allergen Reactions     Amitiza [Lubiprostone] GI Disturbance        REVIEW OF SYSTEMS:   5 point ROS negative except as noted above in HPI, including Gen., Resp., CV, GI &  system review.    PHYSICAL EXAM:   [facility] 06/27/2022  Estimated body mass index is 31.36 kg/m  as calculated from the following:    Height as of 7/18/22: 1.676 m (5' 6\").    Weight as of 7/18/22: 88.1 kg (194 lb 5 oz).   GENERAL APPEARANCE: alert, and oriented  MENTAL STATUS: alert  AIRWAY EXAM: Mallampatti Class I (visualization of the soft palate, fauces, uvula, anterior and posterior pillars)  RESP: lungs clear to auscultation - no rales, rhonchi or wheezes  CV: regular rates and rhythm  DIAGNOSTICS:    Not indicated    IMPRESSION   ASA Class 2 - Mild systemic disease    PLAN:   Plan for Colonoscopy with possible biopsy, possible polypectomy. We discussed the risks, benefits and alternatives and the patient wished to proceed.    The above has been forwarded to the consulting provider.      Signed Electronically by: Tucker Sánchez MD  August 25, 2022          "

## 2022-08-26 LAB
PATH REPORT.COMMENTS IMP SPEC: NORMAL
PATH REPORT.COMMENTS IMP SPEC: NORMAL
PATH REPORT.FINAL DX SPEC: NORMAL
PATH REPORT.GROSS SPEC: NORMAL
PATH REPORT.MICROSCOPIC SPEC OTHER STN: NORMAL
PATH REPORT.RELEVANT HX SPEC: NORMAL
PHOTO IMAGE: NORMAL

## 2022-08-26 PROCEDURE — 88305 TISSUE EXAM BY PATHOLOGIST: CPT | Mod: 26 | Performed by: PATHOLOGY

## 2022-09-18 ENCOUNTER — HEALTH MAINTENANCE LETTER (OUTPATIENT)
Age: 52
End: 2022-09-18

## 2022-10-25 ENCOUNTER — OFFICE VISIT (OUTPATIENT)
Dept: URGENT CARE | Facility: URGENT CARE | Age: 52
End: 2022-10-25
Payer: COMMERCIAL

## 2022-10-25 VITALS
DIASTOLIC BLOOD PRESSURE: 86 MMHG | WEIGHT: 192 LBS | OXYGEN SATURATION: 96 % | SYSTOLIC BLOOD PRESSURE: 133 MMHG | TEMPERATURE: 97.8 F | BODY MASS INDEX: 30.99 KG/M2 | HEART RATE: 83 BPM

## 2022-10-25 DIAGNOSIS — J01.90 ACUTE SINUSITIS WITH COEXISTING CONDITION REQUIRING PROPHYLACTIC TREATMENT: Primary | ICD-10-CM

## 2022-10-25 DIAGNOSIS — R05.9 COUGH, UNSPECIFIED TYPE: ICD-10-CM

## 2022-10-25 DIAGNOSIS — J45.901 EXACERBATION OF ASTHMA, UNSPECIFIED ASTHMA SEVERITY, UNSPECIFIED WHETHER PERSISTENT: ICD-10-CM

## 2022-10-25 DIAGNOSIS — Z20.822 SUSPECTED 2019 NOVEL CORONAVIRUS INFECTION: ICD-10-CM

## 2022-10-25 PROCEDURE — U0003 INFECTIOUS AGENT DETECTION BY NUCLEIC ACID (DNA OR RNA); SEVERE ACUTE RESPIRATORY SYNDROME CORONAVIRUS 2 (SARS-COV-2) (CORONAVIRUS DISEASE [COVID-19]), AMPLIFIED PROBE TECHNIQUE, MAKING USE OF HIGH THROUGHPUT TECHNOLOGIES AS DESCRIBED BY CMS-2020-01-R: HCPCS | Performed by: FAMILY MEDICINE

## 2022-10-25 PROCEDURE — 99214 OFFICE O/P EST MOD 30 MIN: CPT | Mod: CS | Performed by: FAMILY MEDICINE

## 2022-10-25 PROCEDURE — U0005 INFEC AGEN DETEC AMPLI PROBE: HCPCS | Performed by: FAMILY MEDICINE

## 2022-10-25 RX ORDER — DOXYCYCLINE 100 MG/1
100 TABLET ORAL 2 TIMES DAILY
Qty: 14 TABLET | Refills: 0 | Status: SHIPPED | OUTPATIENT
Start: 2022-10-25 | End: 2022-11-01

## 2022-10-25 NOTE — PROGRESS NOTES
SUBJECTIVE: Xenia Snow is a 52 year old female presenting with a chief complaint of nasal congestion, cough  and facial pain/pressure.  Onset of symptoms was 8 day(s) ago.  Current and Associated symptoms: stuffy nose, cough - non-productive and facial pain/pressure  Treatment measures tried include Inhaler (name: alb).  Predisposing factors include HX of asthma.    Past Medical History:   Diagnosis Date     History of ventricular tachycardia     on Toprol     Hypertriglyceridemia      Impaired fasting glucose      Obesity (BMI 30-39.9)      Allergies   Allergen Reactions     Amitiza [Lubiprostone] GI Disturbance     Social History     Tobacco Use     Smoking status: Never     Smokeless tobacco: Never   Substance Use Topics     Alcohol use: Never       ROS:  SKIN: no rash  GI: no vomiting    OBJECTIVE:  /86   Pulse 83   Temp 97.8  F (36.6  C)   Wt 87.1 kg (192 lb)   SpO2 96%   BMI 30.99 kg/m  GENERAL APPEARANCE: healthy, alert and no distress  EYES: EOMI,  PERRL, conjunctiva clear  HENT: TM's normal bilaterally, rhinorrhea yellow, oral mucous membranes moist, no erythema noted and maxillary sinus tenderness   RESP: lungs clear to auscultation - no rales, rhonchi or wheezes  SKIN: no suspicious lesions or rashes      ICD-10-CM    1. Acute sinusitis with coexisting condition requiring prophylactic treatment  J01.90 doxycycline monohydrate (ADOXA) 100 MG tablet      2. Cough, unspecified type  R05.9 doxycycline monohydrate (ADOXA) 100 MG tablet      3. Exacerbation of asthma, unspecified asthma severity, unspecified whether persistent  J45.901       4. Suspected 2019 novel coronavirus infection  Z20.822 Symptomatic; Unknown COVID-19 Virus (Coronavirus) by PCR Nose        Cont alb inhaler  Fluids/Rest, f/u if worse/not any better

## 2022-10-25 NOTE — LETTER
[facility]RG[facility]  [address] [address]  [phone]      October 25, 2022    RE:  Xenia Snow                                                                                                                                                       [address] [place] [address]            To whom it may concern:    Xenia Snow is under my professional care for Medical.  She  may return to work with the following: No working or lifting restrictions on or about when COVID test negative.          Sincerely,        DO LUNA [facility]

## 2022-10-26 LAB — SARS-COV-2 RNA RESP QL NAA+PROBE: NEGATIVE

## 2023-01-01 ENCOUNTER — NURSE TRIAGE (OUTPATIENT)
Dept: NURSING | Facility: CLINIC | Age: 53
End: 2023-01-01

## 2023-01-01 NOTE — TELEPHONE ENCOUNTER
"Triage Call:     Pt calling to report that she is having back pain  \"Back flare: for four day  Lower back  to upper back  Back is \"Stiff and siezed:  Pain at rest: 4/10  Pain now: 6/10  Pain with movement: 8/10    Sleeping helps  Tiger balm  Ibuprofen  Hot shower    Disposition: see PCP within 3 days. Care advice was given. Pt was transferred to [facility] to make an appt.     Heaven Denton RN  [facility] Nurse Advisor 11:51 AM 1/1/2023        Reason for Disposition    [1] MODERATE back pain (e.g., interferes with normal activities) AND [2] present > 3 days    Additional Information    Negative: Passed out (i.e., lost consciousness, collapsed and was not responding)    Negative: Shock suspected (e.g., cold/pale/clammy skin, too weak to stand, low BP, rapid pulse)    Negative: Sounds like a life-threatening emergency to the triager    Negative: [1] SEVERE back pain (e.g., excruciating) AND [2] sudden onset AND [3] age > 60 years    Negative: [1] Unable to urinate (or only a few drops) > 4 hours AND [2] bladder feels very full (e.g., palpable bladder or strong urge to urinate)    Negative: [1] Loss of bladder or bowel control (urine or bowel incontinence; wetting self, leaking stool) AND [2] new-onset    Negative: Numbness in groin or rectal area (i.e., loss of sensation)    Negative: [1] SEVERE abdominal pain AND [2] present > 1 hour    Negative: [1] Abdominal pain AND [2] age > 60 years    Negative: Weakness of a leg or foot (e.g., unable to bear weight, dragging foot)    Negative: Unable to walk    Negative: Patient sounds very sick or weak to the triager    Negative: [1] SEVERE back pain (e.g., excruciating, unable to do any normal activities) AND [2] not improved 2 hours after pain medicine    Negative: [1] Pain radiates into the thigh or further down the leg AND [2] both legs    Negative: [1] Fever > 100.0 F (37.8 C) AND [2] flank pain (i.e., in side, below ribs and above hip)    Negative: [1] Pain or " "burning with passing urine (urination) AND [2] flank pain (i.e., in side, below ribs and above hip)    Negative: Numbness in a leg or foot (i.e., loss of sensation)    Negative: [1] Numbness in an arm or hand (i.e., loss of sensation) AND [2] upper back pain    Negative: High-risk adult (e.g., history of cancer, HIV, or IV drug use)    Negative: Soft tissue infection (e.g., abscess, cellulitis) or other serious infection (e.g., bacteremia) in last 2 weeks    Negative: [1] Fever AND [2] no symptoms of UTI  (Exception: has generalized muscle pains, not localized back pain)    Negative: Rash in same area as pain (may be described as \"small blisters\")    Negative: Blood in urine (red, pink, or tea-colored)    Protocols used: BACK PAIN-A-AH      "

## 2023-01-20 ENCOUNTER — OFFICE VISIT (OUTPATIENT)
Dept: URGENT CARE | Facility: URGENT CARE | Age: 53
End: 2023-01-20
Payer: COMMERCIAL

## 2023-01-20 VITALS
TEMPERATURE: 98.7 F | DIASTOLIC BLOOD PRESSURE: 93 MMHG | SYSTOLIC BLOOD PRESSURE: 136 MMHG | HEART RATE: 102 BPM | OXYGEN SATURATION: 96 %

## 2023-01-20 DIAGNOSIS — Z86.79 HISTORY OF VENTRICULAR TACHYCARDIA: ICD-10-CM

## 2023-01-20 DIAGNOSIS — J20.9 ACUTE BRONCHITIS WITH SYMPTOMS > 10 DAYS: Primary | ICD-10-CM

## 2023-01-20 PROCEDURE — 99214 OFFICE O/P EST MOD 30 MIN: CPT | Performed by: FAMILY MEDICINE

## 2023-01-20 RX ORDER — AZITHROMYCIN 250 MG/1
TABLET, FILM COATED ORAL
Qty: 6 TABLET | Refills: 0 | Status: SHIPPED | OUTPATIENT
Start: 2023-01-20 | End: 2023-01-25

## 2023-01-20 RX ORDER — METOPROLOL SUCCINATE 25 MG/1
25 TABLET, EXTENDED RELEASE ORAL DAILY
Qty: 90 TABLET | Refills: 0 | Status: SHIPPED | OUTPATIENT
Start: 2023-01-20 | End: 2023-05-02

## 2023-01-20 RX ORDER — PREDNISONE 20 MG/1
20 TABLET ORAL 2 TIMES DAILY
Qty: 10 TABLET | Refills: 0 | Status: SHIPPED | OUTPATIENT
Start: 2023-01-20 | End: 2023-01-25

## 2023-01-20 RX ORDER — BENZONATATE 200 MG/1
200 CAPSULE ORAL 3 TIMES DAILY PRN
Qty: 21 CAPSULE | Refills: 0 | Status: SHIPPED | OUTPATIENT
Start: 2023-01-20 | End: 2023-01-27

## 2023-01-20 NOTE — PROGRESS NOTES
SUBJECTIVE: Xenia Snow is a 52 year old female presenting with a chief complaint of cough .  Onset of symptoms was 2 week(s) ago.  Course of illness is worsening.    Severity moderate  Using imhaler    Past Medical History:   Diagnosis Date     History of ventricular tachycardia     on Toprol     Hypertriglyceridemia      Impaired fasting glucose      Obesity (BMI 30-39.9)      Allergies   Allergen Reactions     Amitiza [Lubiprostone] GI Disturbance     Social History     Tobacco Use     Smoking status: Never     Smokeless tobacco: Never   Substance Use Topics     Alcohol use: Never       ROS:  SKIN: no rash  GI: no vomiting    OBJECTIVE:  BP (!) 136/93   Pulse 102   Temp 98.7  F (37.1  C) (Tympanic)   SpO2 96% GENERAL APPEARANCE: healthy, alert and no distress  EYES: EOMI,  PERRL, conjunctiva clear  HENT: ear canals and TM's normal.  Nose and mouth without ulcers, erythema or lesions  RESP: lungs clear to auscultation - no rales, rhonchi or wheezes  SKIN: no suspicious lesions or rashes      ICD-10-CM    1. Acute bronchitis with symptoms > 10 days  J20.9 predniSONE (DELTASONE) 20 MG tablet     azithromycin (ZITHROMAX) 250 MG tablet     benzonatate (TESSALON) 200 MG capsule          Fluids/Rest, f/u if worse/not any better

## 2023-05-01 ASSESSMENT — ASTHMA QUESTIONNAIRES
QUESTION_3 LAST FOUR WEEKS HOW OFTEN DID YOUR ASTHMA SYMPTOMS (WHEEZING, COUGHING, SHORTNESS OF BREATH, CHEST TIGHTNESS OR PAIN) WAKE YOU UP AT NIGHT OR EARLIER THAN USUAL IN THE MORNING: NOT AT ALL
QUESTION_1 LAST FOUR WEEKS HOW MUCH OF THE TIME DID YOUR ASTHMA KEEP YOU FROM GETTING AS MUCH DONE AT WORK, SCHOOL OR AT HOME: A LITTLE OF THE TIME
QUESTION_2 LAST FOUR WEEKS HOW OFTEN HAVE YOU HAD SHORTNESS OF BREATH: ONCE OR TWICE A WEEK
ACT_TOTALSCORE: 21
QUESTION_4 LAST FOUR WEEKS HOW OFTEN HAVE YOU USED YOUR RESCUE INHALER OR NEBULIZER MEDICATION (SUCH AS ALBUTEROL): ONCE A WEEK OR LESS
QUESTION_5 LAST FOUR WEEKS HOW WOULD YOU RATE YOUR ASTHMA CONTROL: WELL CONTROLLED
ACT_TOTALSCORE: 21

## 2023-05-02 ENCOUNTER — OFFICE VISIT (OUTPATIENT)
Dept: INTERNAL MEDICINE | Facility: CLINIC | Age: 53
End: 2023-05-02
Payer: COMMERCIAL

## 2023-05-02 ENCOUNTER — ANCILLARY PROCEDURE (OUTPATIENT)
Dept: GENERAL RADIOLOGY | Facility: CLINIC | Age: 53
End: 2023-05-02
Attending: PHYSICIAN ASSISTANT
Payer: COMMERCIAL

## 2023-05-02 VITALS
BODY MASS INDEX: 33.62 KG/M2 | HEIGHT: 66 IN | OXYGEN SATURATION: 97 % | SYSTOLIC BLOOD PRESSURE: 126 MMHG | DIASTOLIC BLOOD PRESSURE: 74 MMHG | TEMPERATURE: 98.6 F | HEART RATE: 85 BPM | WEIGHT: 209.2 LBS

## 2023-05-02 DIAGNOSIS — Z86.79 HISTORY OF VENTRICULAR TACHYCARDIA: ICD-10-CM

## 2023-05-02 DIAGNOSIS — M25.561 ACUTE PAIN OF RIGHT KNEE: Primary | ICD-10-CM

## 2023-05-02 PROCEDURE — 73562 X-RAY EXAM OF KNEE 3: CPT | Mod: TC | Performed by: RADIOLOGY

## 2023-05-02 PROCEDURE — 99213 OFFICE O/P EST LOW 20 MIN: CPT | Performed by: PHYSICIAN ASSISTANT

## 2023-05-02 RX ORDER — METOPROLOL SUCCINATE 25 MG/1
25 TABLET, EXTENDED RELEASE ORAL DAILY
Qty: 90 TABLET | Refills: 0 | Status: SHIPPED | OUTPATIENT
Start: 2023-05-02 | End: 2023-08-28

## 2023-05-02 ASSESSMENT — PAIN SCALES - GENERAL: PAINLEVEL: MILD PAIN (3)

## 2023-05-02 NOTE — PROGRESS NOTES
"  Assessment & Plan     Acute pain of right knee  Continue with OTC   Referral as noted   - XR Knee Right 3 Views; Future  - Orthopedic  Referral; Future    History of ventricular tachycardia  Refill done x 1 - needs appt with PCP for PE and refills   - metoprolol succinate ER (TOPROL XL) 25 MG 24 hr tablet; Take 1 tablet (25 mg) by mouth daily             BMI:   Estimated body mass index is 33.77 kg/m  as calculated from the following:    Height as of this encounter: 1.676 m (5' 6\").    Weight as of this encounter: 94.9 kg (209 lb 3.2 oz).           Beatriz Rios PA-C  [facility] SPIKE Michaels is a 53 year old, presenting for the following health issues:  Knee Injury (Right knee)        5/2/2023    11:18 AM   Additional Questions   Roomed by Stephy PAYAN CMA     History of Present Illness       Reason for visit:  Injury to right knee 4 weeks ago. Not improving  Symptom onset:  More than a month  Symptoms include:  Sudden Intermittent pain in right knee. Trouble up&down stairs. Squatting produces severe pain  Symptom intensity:  Moderate  Symptom progression:  Staying the same  Had these symptoms before:  No  What makes it worse:  Bending knee, putting weight on it for too long  What makes it better:  Sitting, keeping straight, sleep    She eats 4 or more servings of fruits and vegetables daily.She consumes 1 sweetened beverage(s) daily.She exercises with enough effort to increase her heart rate 10 to 19 minutes per day.  She exercises with enough effort to increase her heart rate 6 days per week.   She is taking medications regularly.     Sitting - kneeling. And squatting to put wood into the fire place in April after that weekend had pain in both knees.  The left knee improved within a week or so with rest  Right knee has continued with pain and increased pain at times.   Sharp pain with squatting, and stairs, pivoting also more painful  Inside of knee, " "medial     Has been icing taking aleve x 2 and tylenol.               Review of Systems         Objective    /74   Pulse 85   Temp 98.6  F (37  C) (Oral)   Ht 1.676 m (5' 6\")   Wt 94.9 kg (209 lb 3.2 oz)   LMP 04/28/2023 (Exact Date)   SpO2 97%   Breastfeeding No   BMI 33.77 kg/m    Body mass index is 33.77 kg/m .  Physical Exam   GENERAL: healthy, alert and no distress  MS: right knee with pain at the medial joint line  Mild discomfort over the MCL   Pain with flexion and extension at the medial knee  Anterior and posterior drawer negative  SKIN: no suspicious lesions or rashes                    "

## 2023-05-07 ENCOUNTER — HEALTH MAINTENANCE LETTER (OUTPATIENT)
Age: 53
End: 2023-05-07

## 2023-05-10 ENCOUNTER — OFFICE VISIT (OUTPATIENT)
Dept: ORTHOPEDICS | Facility: CLINIC | Age: 53
End: 2023-05-10
Attending: PHYSICIAN ASSISTANT
Payer: COMMERCIAL

## 2023-05-10 VITALS
HEIGHT: 66 IN | SYSTOLIC BLOOD PRESSURE: 145 MMHG | BODY MASS INDEX: 33.68 KG/M2 | DIASTOLIC BLOOD PRESSURE: 88 MMHG | WEIGHT: 209.6 LBS

## 2023-05-10 DIAGNOSIS — M25.561 ACUTE PAIN OF RIGHT KNEE: Primary | ICD-10-CM

## 2023-05-10 PROCEDURE — 99204 OFFICE O/P NEW MOD 45 MIN: CPT | Performed by: FAMILY MEDICINE

## 2023-05-10 RX ORDER — DICLOFENAC SODIUM 75 MG/1
75 TABLET, DELAYED RELEASE ORAL 2 TIMES DAILY
Qty: 20 TABLET | Refills: 0 | Status: SHIPPED | OUTPATIENT
Start: 2023-05-10 | End: 2023-05-23

## 2023-05-10 NOTE — PATIENT INSTRUCTIONS
Thank you for choosing [facility]    [facility] LOCATIONS  [place] [address] [address] [facility], 4th [place]   [place], [address] [place] [address]   [phone]       APPOINTMENTS: [phone]    CARE QUESTIONS: [phone],    BILLING QUESTIONS: [phone]    FAX NUMBER: [phone]        Follow up: 6 weeks as needed      1. Acute pain of right knee        Knee Exercises     You may do the first 7 exercises right away. You may do the rest of the exercises when the pain in your knee has decreased.    Passive knee extension: Do this exercise if you are unable to extend your knee fully. While lying on your back, place a rolled-up towel under the heel of your injured leg so the heel is about 6 inches off the ground. Relax your leg muscles and let gravity slowly straighten your knee. Try to hold this position for 2 minutes. Repeat 3 times. You may feel some discomfort while doing this exercise. Do the exercise several times a day.  This exercise can also be done while sitting in a chair with your heel on another chair or stool.    Heel slide: Sit on a firm surface with your legs straight in front of you. Slowly slide the heel of the foot on your injured side toward your buttock by pulling your knee toward your chest as you slide the heel. Return to the starting position. Do 2 sets of 15.  Standing calf stretch: Stand facing a wall with your hands on the wall at about eye level. Keep your injured leg back with your heel on the floor. Keep the other leg forward with the knee bent. Turn your back foot slightly inward (as if you were pigeon-toed). Slowly lean into the wall until you feel a stretch in the back of your calf. Hold the stretch for 15 to 30 seconds. Return to the starting position. Repeat 3 times. Do this exercise several times each day.    Hamstring stretch on wall: Lie on your back with your buttocks close to a  doorway. Stretch your uninjured leg straight out in front of you on the floor through the doorway. Raise your injured leg and rest it against the wall next to the door frame. Keep your leg as straight as possible. You should feel a stretch in the back of your thigh. Hold this position for 15 to 30 seconds. Repeat 3 times.  Straight leg raise: Lie on your back with your legs straight out in front of you. Bend the knee on your uninjured side and place the foot flat on the floor. Tighten the thigh muscle on your injured side and lift your leg about 8 inches off the floor. Keep your leg straight and your thigh muscle tight. Slowly lower your leg back down to the floor. Do 2 sets of 15.    Prone hip extension: Lie on your stomach with your legs straight out behind you. Fold your arms under your head and rest your head on your arms. Draw your belly button in towards your spine and tighten your abdominal muscles. Tighten the buttocks and thigh muscles of the leg on your injured side and lift the leg off the floor about 8 inches. Keep your leg straight. Hold for 5 seconds. Then lower your leg and relax. Do 2 sets of 15.  Clam exercise: Lie on your uninjured side with your hips and knees bent and feet together. Slowly raise your top leg toward the ceiling while keeping your heels touching each other. Hold for 2 seconds and lower slowly. Do 2 sets of 15 repetitions.    Wall squat with a ball: Stand with your back, shoulders, and head against a wall. Look straight ahead. Keep your shoulders relaxed and your feet 3 feet (90 centimeters) from the wall and shoulder's width apart. Place a soccer or basketball-sized ball behind your back. Keeping your back against the wall, slowly squat down to a 45-degree angle. Your thighs will not yet be parallel to the floor. Hold this position for 10 seconds and then slowly slide back up the wall. Repeat 10 times. Build up to 2 sets of 15.  Step-up: Stand with the foot of your injured leg on a  support 3 to 5 inches (8 to 13 centimeters) high --like a small step or block of wood. Keep your other foot flat on the floor. Shift your weight onto the injured leg on the support. Straighten your injured leg as the other leg comes off the floor. Return to the starting position by bending your injured leg and slowly lowering your uninjured leg back to the floor. Do 2 sets of 15.    Knee stabilization: Wrap a piece of elastic tubing around the ankle of your uninjured leg. Tie a knot in the other end of the tubing and close it in a door at about ankle height.  Stand facing the door on the leg without tubing (your injured leg) and bend your knee slightly, keeping your thigh muscles tight. Stay in this position while you move the leg with the tubing (the uninjured leg) straight back behind you. Do 2 sets of 15.  Turn 90 degrees so the leg without tubing is closest to the door. Move the leg with tubing away from your body. Do 2 sets of 15.  Turn 90 degrees again so your back is to the door. Move the leg with tubing straight out in front of you. Do 2 sets of 15.  Turn your body 90 degrees again so the leg with tubing is closest to the door. Move the leg with tubing across your body. Do 2 sets of 15.  Hold onto a chair if you need help balancing. This exercise can be made more challenging by standing on a firm pillow or foam mat while you move the leg with tubing.    Resisted terminal knee extension: Make a loop with a piece of elastic tubing by tying a knot in both ends. Close the knot in a door at knee height. Step into the loop with your injured leg so the tubing is around the back of your knee. Lift the other foot off the ground and hold onto a chair for balance, if needed. Bend the knee with tubing about 45 degrees. Slowly straighten your leg, keeping your thigh muscle tight as you do this. Repeat 15 times. Do 2 sets of 15. If you need an easier way to do this, stand on both legs for better support while you do the  exercise.  If you have access to a wobble board, do the following exercises:            Developed by [employer].  Published by [employer].  Copyright  2014 [employer] and/or one of its subsidiaries. All rights reserved.    [employer] Knit Knee Compression Sleeve  <[email or web address]>      Wear sleeve during waking hours and when on your feet  Provides compression for knee swelling, bursitis, arthritis  Adds warmth, limits patella movement, and can increase proprioception (the capacity to feel the position of a joint in space as sensed by the brain)

## 2023-05-10 NOTE — LETTER
5/10/2023         RE: Xenia Snow  [address] [facility] [address]        Dear Colleague,    Thank you for referring your patient, Xenia Snow, to the [facility] [place]. Please see a copy of my visit note below.    CHIEF COMPLAINT:  Pain of the Right Knee       HISTORY OF PRESENT ILLNESS  Ms. Snow is a pleasant 53 year old year old female who presents to clinic today with right knee pain.  Xenia explains that about 1 month ago their power went out and they had to utilize their fireplace. This required her to kneel frequently and she had one incident of kneeling on a small object and felt onset of pain.     Onset: sudden  Location: right knee  Quality:  Sharp  Duration: 1+ months   Severity: 9/10 at worst  Timing:intermittent episodes   Modifying factors:  resting and non-use makes it better, movement and use makes it worse  Associated signs & symptoms: pain is anteromedial, stiffness when sitting for long periods of time  Previous similar pain: No  Treatments to date: rest, activity modification, compression, ice, OTCs    Additional history: Improvement noted over the past 2 weeks.  Intermittent pain though can be severe.  For example when she was bending down and the grocery store to grab an item at the lowest shelf, had severe pain.    Review of Systems:    Have you recently had a a fever, chills, weight loss? No    Do you have any vision problems? No    Do you have any chest pain or edema? No    Do you have any shortness of breath or wheezing?  No    Do you have stomach problems? No    Do you have any numbness or focal weakness? No    Do you have diabetes? No    Do you have problems with bleeding or clotting? No    Do you have an rashes or other skin lesions? No      MEDICAL HISTORY  Patient Active Problem List   Diagnosis     Impaired fasting glucose     History of ventricular tachycardia     Obesity (BMI 30-39.9)     Hypertriglyceridemia  "      Current Outpatient Medications   Medication Sig Dispense Refill     chlorpheniramine (CHLOR-TRIMETON) 4 MG tablet Take 4 mg by mouth every 6 hours as needed for allergies or rhinitis       ibuprofen (ADVIL/MOTRIN) 200 MG tablet Take 400 mg by mouth every 4 hours as needed for mild pain       loratadine (CLARITIN) 10 MG tablet Take 1 tablet (10 mg) by mouth daily 90 tablet 3     metoprolol succinate ER (TOPROL XL) 25 MG 24 hr tablet Take 1 tablet (25 mg) by mouth daily 90 tablet 0     albuterol (PROAIR HFA/PROVENTIL HFA/VENTOLIN HFA) 108 (90 Base) MCG/ACT inhaler Inhale 1-2 puffs into the lungs every 6 hours for 5 days 8.5 g 0       Allergies   Allergen Reactions     Amitiza [Lubiprostone] GI Disturbance       Family History   Problem Relation Age of Onset     Hypertension Mother      Hyperlipidemia Mother      Breast Cancer Mother         post-menopausal     Skin Cancer Father         unknown types     Pacemaker Father      Valvular heart disease Father      Coronary Artery Disease Maternal Grandmother         s/p CABG later in life     Myocardial Infarction Maternal Grandfather         later in life     Breast Cancer Paternal Grandmother         post-menopausal     Myocardial Infarction Maternal Uncle         later in life     Rheumatoid Arthritis Paternal Aunt      Diabetes No family hx of      Coronary Artery Disease Early Onset No family hx of      Cerebrovascular Disease No family hx of      Ovarian Cancer No family hx of      Colon Cancer No family hx of        Additional medical/Social/Surgical histories reviewed in [facility] and updated as appropriate.       PHYSICAL EXAM  Ht 1.676 m (5' 6\")   Wt 95.1 kg (209 lb 9.6 oz)   LMP 04/28/2023 (Exact Date)   BMI 33.83 kg/m      General  - normal appearance, in no obvious distress  Musculoskeletal - right  knee  - stance: normal gait without limp  - inspection: no swelling or effusion  - palpation: no joint line tenderness, patellar tendon non-tender, tender " medial patellar facet and medial >lateral joint line  - ROM: 135 degrees flexion, -5 degrees extension, not painful, crepitus with weight-bearing flexion  - strength: 5/5 in flexion, 5/5 in extension  - special tests:  (-) Lachman  (-) anterior drawer  (-) Ren  (-) Thessaly  (-) varus at 0 and 30 degrees flexion  (-) valgus at 0 and 30 degrees flexion  (+) patellar compression  (-) patellar apprehension  Neuro  - no sensory or motor deficit, grossly normal coordination, normal muscle tone    IMAGING : Right knee 3 views. Final results and radiologist's interpretation, available in the [facility] health record. Images were reviewed with the patient/family members in the office today. My personal interpretation of the performed imaging is mild osteoarthritis with joint narrowing of medial compartment.     ASSESSMENT & PLAN  Ms. Snow is a 53 year old year old female who presents to clinic today with acute right knee pain over the past 4 weeks since frequent bending/kneeling stoking their fireplace.     Diagnosis:   1. Acute pain of right knee  2. Mild osteoarthritis of right knee    Discussed likelihood of exacerbation of osteoarthritis of right knee, possible underlying symptomatic chondromalacia as well.  Treatment options recommended including continuing with oral analgesics, using knee sleeve/bracing, avoiding deep flexion exercises as well as home exercise program.  She will follow these measures.  Diclofenac course prescribed BID x 10 days. Consider CSI if no improvement in 6 weeks.    It was a pleasure seeing Xenia today.    Lokesh Lowe DO, [facility]  Primary Care Sports Medicine      Again, thank you for allowing me to participate in the care of your patient.        Sincerely,        Lokesh Lowe DO

## 2023-05-10 NOTE — PROGRESS NOTES
CHIEF COMPLAINT:  Pain of the Right Knee       HISTORY OF PRESENT ILLNESS  Ms. Snow is a pleasant 53 year old year old female who presents to clinic today with right knee pain.  Xenia explains that about 1 month ago their power went out and they had to utilize their fireplace. This required her to kneel frequently and she had one incident of kneeling on a small object and felt onset of pain.     Onset: sudden  Location: right knee  Quality:  Sharp  Duration: 1+ months   Severity: 9/10 at worst  Timing:intermittent episodes   Modifying factors:  resting and non-use makes it better, movement and use makes it worse  Associated signs & symptoms: pain is anteromedial, stiffness when sitting for long periods of time  Previous similar pain: No  Treatments to date: rest, activity modification, compression, ice, OTCs    Additional history: Improvement noted over the past 2 weeks.  Intermittent pain though can be severe.  For example when she was bending down and the grocery store to grab an item at the lowest shelf, had severe pain.    Review of Systems:    Have you recently had a a fever, chills, weight loss? No    Do you have any vision problems? No    Do you have any chest pain or edema? No    Do you have any shortness of breath or wheezing?  No    Do you have stomach problems? No    Do you have any numbness or focal weakness? No    Do you have diabetes? No    Do you have problems with bleeding or clotting? No    Do you have an rashes or other skin lesions? No      MEDICAL HISTORY  Patient Active Problem List   Diagnosis     Impaired fasting glucose     History of ventricular tachycardia     Obesity (BMI 30-39.9)     Hypertriglyceridemia       Current Outpatient Medications   Medication Sig Dispense Refill     chlorpheniramine (CHLOR-TRIMETON) 4 MG tablet Take 4 mg by mouth every 6 hours as needed for allergies or rhinitis       ibuprofen (ADVIL/MOTRIN) 200 MG tablet Take 400 mg by mouth every 4 hours as needed  "for mild pain       loratadine (CLARITIN) 10 MG tablet Take 1 tablet (10 mg) by mouth daily 90 tablet 3     metoprolol succinate ER (TOPROL XL) 25 MG 24 hr tablet Take 1 tablet (25 mg) by mouth daily 90 tablet 0     albuterol (PROAIR HFA/PROVENTIL HFA/VENTOLIN HFA) 108 (90 Base) MCG/ACT inhaler Inhale 1-2 puffs into the lungs every 6 hours for 5 days 8.5 g 0       Allergies   Allergen Reactions     Amitiza [Lubiprostone] GI Disturbance       Family History   Problem Relation Age of Onset     Hypertension Mother      Hyperlipidemia Mother      Breast Cancer Mother         post-menopausal     Skin Cancer Father         unknown types     Pacemaker Father      Valvular heart disease Father      Coronary Artery Disease Maternal Grandmother         s/p CABG later in life     Myocardial Infarction Maternal Grandfather         later in life     Breast Cancer Paternal Grandmother         post-menopausal     Myocardial Infarction Maternal Uncle         later in life     Rheumatoid Arthritis Paternal Aunt      Diabetes No family hx of      Coronary Artery Disease Early Onset No family hx of      Cerebrovascular Disease No family hx of      Ovarian Cancer No family hx of      Colon Cancer No family hx of        Additional medical/Social/Surgical histories reviewed in [facility] and updated as appropriate.       PHYSICAL EXAM  Ht 1.676 m (5' 6\")   Wt 95.1 kg (209 lb 9.6 oz)   LMP 04/28/2023 (Exact Date)   BMI 33.83 kg/m      General  - normal appearance, in no obvious distress  Musculoskeletal - right  knee  - stance: normal gait without limp  - inspection: no swelling or effusion  - palpation: no joint line tenderness, patellar tendon non-tender, tender medial patellar facet and medial >lateral joint line  - ROM: 135 degrees flexion, -5 degrees extension, not painful, crepitus with weight-bearing flexion  - strength: 5/5 in flexion, 5/5 in extension  - special tests:  (-) Lachman  (-) anterior drawer  (-) Ren  (-) " Thessaly  (-) varus at 0 and 30 degrees flexion  (-) valgus at 0 and 30 degrees flexion  (+) patellar compression  (-) patellar apprehension  Neuro  - no sensory or motor deficit, grossly normal coordination, normal muscle tone    IMAGING : Right knee 3 views. Final results and radiologist's interpretation, available in the [facility] health record. Images were reviewed with the patient/family members in the office today. My personal interpretation of the performed imaging is mild osteoarthritis with joint narrowing of medial compartment.     ASSESSMENT & PLAN  Ms. Snow is a 53 year old year old female who presents to clinic today with acute right knee pain over the past 4 weeks since frequent bending/kneeling stoking their fireplace.     Diagnosis:   1. Acute pain of right knee  2. Mild osteoarthritis of right knee    Discussed likelihood of exacerbation of osteoarthritis of right knee, possible underlying symptomatic chondromalacia as well.  Treatment options recommended including continuing with oral analgesics, using knee sleeve/bracing, avoiding deep flexion exercises as well as home exercise program.  She will follow these measures.  Diclofenac course prescribed BID x 10 days. Consider CSI if no improvement in 6 weeks.    It was a pleasure seeing Xenia today.    Lokesh Lowe DO, CAQSM  Primary Care Sports Medicine

## 2023-05-22 DIAGNOSIS — M25.561 ACUTE PAIN OF RIGHT KNEE: ICD-10-CM

## 2023-05-22 RX ORDER — DICLOFENAC SODIUM 75 MG/1
75 TABLET, DELAYED RELEASE ORAL 2 TIMES DAILY
Qty: 20 TABLET | Refills: 0 | Status: CANCELLED | OUTPATIENT
Start: 2023-05-22

## 2023-05-22 NOTE — TELEPHONE ENCOUNTER
Medication Request for: diclofenac 75mg          Prescription last written on 5/10/23 by Dr. Lowe    Sig: take 1 tab twice daily    Last Fill Quantity: 20 with # refills: 0     Last Office Visit by provider: 5/10/23    Pharmacy selected in [place].    Phone number patient can be reached at: Cell number on file:    Telephone Information:   Mobile [phone]       Can we leave a detailed message on this number? NO    Please advise regarding refill request.     TOBI Ford RN

## 2023-05-22 NOTE — TELEPHONE ENCOUNTER
[facility] Call Center    Phone Message    May a detailed message be left on voicemail: no     Reason for Call: Requesting refill on  Diclofenac  [facility] [address] [place]

## 2023-05-23 DIAGNOSIS — M25.561 ACUTE PAIN OF RIGHT KNEE: ICD-10-CM

## 2023-05-23 RX ORDER — DICLOFENAC SODIUM 75 MG/1
75 TABLET, DELAYED RELEASE ORAL 2 TIMES DAILY
Qty: 20 TABLET | Refills: 0 | Status: SHIPPED | OUTPATIENT
Start: 2023-05-23 | End: 2023-06-02

## 2023-06-01 ENCOUNTER — TELEPHONE (OUTPATIENT)
Dept: ORTHOPEDICS | Facility: CLINIC | Age: 53
End: 2023-06-01
Payer: COMMERCIAL

## 2023-06-01 DIAGNOSIS — M25.561 ACUTE PAIN OF RIGHT KNEE: ICD-10-CM

## 2023-06-01 NOTE — TELEPHONE ENCOUNTER
Medication Request for:  Diclofenac Sodium 75 MG DR Tablets    Patient currently takin tablet twice daily  Patient has 1 of medication remaining.  Patient is also taking OTC: Tylenol    Problems/ Concerns of Patient: no side effects reported  Last prescription written: diclofenac (VOLTAREN) 75 MG EC tablet  Prescription last written on 23 by Dr. Lowe  Sig: Take 1 tablet (75 mg) by mouth 2 times daily - Oral  Last Fill Quantity: 20 with # refills: 0     Last Office Visit by provider: 5/10/23  Future Office Visit:   23  Patient desires to have faxed or E-prescribe to pharmacy if available  Pharmacy selected in [facility].    Phone number patient can be reached at: Home number on file [phone] (home)      Medication requests may take up to 3 business days for a response  Has patient been notified of this Yes    Patient states she has continued pain that wakes her at night, but that the medication helps. She states her right and left knees hurt equally. She feels she is making progress as she is able to walk more steps daily.   She states when she stopped the medication last time the pain tripled so she is hoping for another refill and fearful of running out of medication. If she runs out she plans to transition to Aleve.    She is taking diclofenac twice daily and Tylenol PRN. She has not tried the HEP.     Patient was notified Dr. Lowe is out of the office until next week and that prescription refill request may not be addressed until then.     Marija Power, ATC

## 2023-06-01 NOTE — TELEPHONE ENCOUNTER
[facility] Call Center    Phone Message    May a detailed message be left on voicemail: yes     Reason for Call: Other: Patient is calling today because she needs a new prescription for Diclofenac sodium 75MG DR Tabs     Action Taken: Other: [facility] [[id number]    Travel Screening: Not Applicable

## 2023-06-02 RX ORDER — DICLOFENAC SODIUM 75 MG/1
75 TABLET, DELAYED RELEASE ORAL 2 TIMES DAILY
Qty: 20 TABLET | Refills: 0 | Status: SHIPPED | OUTPATIENT
Start: 2023-06-02 | End: 2023-09-19

## 2023-06-13 ENCOUNTER — TELEPHONE (OUTPATIENT)
Dept: ORTHOPEDICS | Facility: CLINIC | Age: 53
End: 2023-06-13
Payer: COMMERCIAL

## 2023-06-13 DIAGNOSIS — M25.561 ACUTE PAIN OF RIGHT KNEE: Primary | ICD-10-CM

## 2023-06-13 RX ORDER — DICLOFENAC SODIUM 75 MG/1
75 TABLET, DELAYED RELEASE ORAL 2 TIMES DAILY
Qty: 30 TABLET | Refills: 0 | Status: SHIPPED | OUTPATIENT
Start: 2023-06-13 | End: 2023-09-19

## 2023-06-13 NOTE — TELEPHONE ENCOUNTER
Patient is asking for a refill of her diclofenac sodium by Dr. Lowe to be sent to her pharmacy. Patient states she is out currently. If needed, patient can be reached at [phone].

## 2023-06-13 NOTE — TELEPHONE ENCOUNTER
Last prescription by Dr. Benitez on 6/2/23 in Dr. Lowe's absence. Patient has upcoming appointment on 6/21/23. Please see refill request below and advise on response.     Marija Power MSA, ATC  Certified Athletic Trainer

## 2023-06-14 NOTE — TELEPHONE ENCOUNTER
Notified patient that refill has been sent to her pharmacy. She was very appreciative of call. No further action needed.     Marija Power MSA, ATC  Certified Athletic Trainer

## 2023-06-21 ENCOUNTER — OFFICE VISIT (OUTPATIENT)
Dept: ORTHOPEDICS | Facility: CLINIC | Age: 53
End: 2023-06-21
Payer: COMMERCIAL

## 2023-06-21 VITALS
DIASTOLIC BLOOD PRESSURE: 76 MMHG | HEIGHT: 66 IN | SYSTOLIC BLOOD PRESSURE: 117 MMHG | WEIGHT: 201.6 LBS | BODY MASS INDEX: 32.4 KG/M2

## 2023-06-21 DIAGNOSIS — M25.561 ACUTE PAIN OF RIGHT KNEE: Primary | ICD-10-CM

## 2023-06-21 PROCEDURE — 99213 OFFICE O/P EST LOW 20 MIN: CPT | Performed by: FAMILY MEDICINE

## 2023-06-21 ASSESSMENT — PAIN SCALES - GENERAL: PAINLEVEL: MILD PAIN (2)

## 2023-06-21 NOTE — PROGRESS NOTES
ESTABLISHED PATIENT FOLLOW-UP:  Follow Up of the Right Knee       HISTORY OF PRESENT ILLNESS  Ms. Snow is a pleasant 53 year old year old female who presents to clinic today for follow-up of acute pain of right knee and mild osteoarthritis of right knee.    Date of injury: ongoing 2.5 months  Date last seen: 5/10/23  Following Therapeutic Plan: Yes- diclofenac  Pain: improving, worsens with extension, stairs and twisting motions  Function: improving  Interval History: Patient notes improvement since last visit. She notes both knees are still mildly painful at this point. Minimal pain or pain free on flat surfaces. Painful kneeling, stair climbing.  Has not performed HEP due to pain initially but would be willing to try at this time.  She has questions about HEP.      Completed two courses of diclofenac which she found greatly helpful.  Transitioned to aleve but offered less pain reduction.      Additional medical/Social/Surgical histories reviewed in [facility] and updated as appropriate.    REVIEW OF SYSTEMS (6/21/2023)  CONSTITUTIONAL: Denies fever and weight loss  GASTROINTESTINAL: Denies abdominal pain, nausea, vomiting  MUSCULOSKELETAL: See HPI  SKIN: Denies any recent rash or lesion  NEUROLOGICAL: Denies numbness or focal weakness     PHYSICAL EXAM  [facility] 04/28/2023 (Exact Date)        General  - normal appearance, in no obvious distress  Musculoskeletal - right  knee  - stance: normal gait without limp  - inspection: no swelling or effusion  - palpation: no joint line tenderness, patellar tendon non-tender, tender medial patellar facet and medial >lateral joint line  - ROM: 135 degrees flexion, -5 degrees extension, not painful, crepitus with weight-bearing flexion  - strength: 5/5 in flexion, 5/5 in extension  - special tests:  (-) Lachman  (-) anterior drawer  (-) Ren  (-) Thessaly  (-) varus at 0 and 30 degrees flexion  (-) valgus at 0 and 30 degrees flexion  (+) patellar compression  (-) patellar  apprehension  Neuro  - no sensory or motor deficit, grossly normal coordination, normal muscle tone    IMAGING : XR right knee 3 views.  Final results and radiologist's interpretation, available in the [facility] health record. Images were reviewed with the patient/family members in the office today. My personal interpretation of the performed imaging is mild degenerative changes right knee     ASSESSMENT & PLAN  Ms. Snow is a 53 year old year old female who presents to clinic today with Follow Up of the Right Knee    Concern for exacerbation of patellofemoral pain, medial compartment DJD and additional concern patellar tendinitis.    -Start home exercise program, offered physical therapy referral to learn HEP however she wishes to start with my handouts.   -Continue HEP, consider knee sleeve   -Diclofenac refill provided so that she can initiate HEP /strengthening in a pain free manner  -Follow up 6 weeks if no substantial improvement; consider corticosteroid injection, possibly MRI depending on level of pain.    It was a pleasure seeing Xenia.    Lokesh Lowe, DO, CAQSM  Primary Care Sports Medicine

## 2023-06-21 NOTE — LETTER
6/21/2023         RE: Xenia Snow  [address] [facility] [address]        Dear Colleague,    Thank you for referring your patient, Xenia Snow, to the [facility] [place]. Please see a copy of my visit note below.    ESTABLISHED PATIENT FOLLOW-UP:  Follow Up of the Right Knee       HISTORY OF PRESENT ILLNESS  Ms. Snow is a pleasant 53 year old year old female who presents to clinic today for follow-up of acute pain of right knee and mild osteoarthritis of right knee.    Date of injury: ongoing 2.5 months  Date last seen: 5/10/23  Following Therapeutic Plan: Yes- diclofenac  Pain: improving, worsens with extension, stairs and twisting motions  Function: improving  Interval History: Patient notes improvement since last visit. She notes both knees are still mildly painful at this point. Minimal pain or pain free on flat surfaces. Painful kneeling, stair climbing.  Has not performed HEP due to pain initially but would be willing to try at this time.  She has questions about HEP.      Completed two courses of diclofenac which she found greatly helpful.  Transitioned to aleve but offered less pain reduction.      Additional medical/Social/Surgical histories reviewed in [facility] and updated as appropriate.    REVIEW OF SYSTEMS (6/21/2023)  CONSTITUTIONAL: Denies fever and weight loss  GASTROINTESTINAL: Denies abdominal pain, nausea, vomiting  MUSCULOSKELETAL: See HPI  SKIN: Denies any recent rash or lesion  NEUROLOGICAL: Denies numbness or focal weakness     PHYSICAL EXAM  [facility] 04/28/2023 (Exact Date)        General  - normal appearance, in no obvious distress  Musculoskeletal - right  knee  - stance: normal gait without limp  - inspection: no swelling or effusion  - palpation: no joint line tenderness, patellar tendon non-tender, tender medial patellar facet and medial >lateral joint line  - ROM: 135 degrees flexion, -5 degrees extension, not painful, crepitus with  weight-bearing flexion  - strength: 5/5 in flexion, 5/5 in extension  - special tests:  (-) Lachman  (-) anterior drawer  (-) Ren  (-) Thessaly  (-) varus at 0 and 30 degrees flexion  (-) valgus at 0 and 30 degrees flexion  (+) patellar compression  (-) patellar apprehension  Neuro  - no sensory or motor deficit, grossly normal coordination, normal muscle tone    IMAGING : XR right knee 3 views.  Final results and radiologist's interpretation, available in the [facility] health record. Images were reviewed with the patient/family members in the office today. My personal interpretation of the performed imaging is mild degenerative changes right knee     ASSESSMENT & PLAN  Ms. Snow is a 53 year old year old female who presents to clinic today with Follow Up of the Right Knee    Concern for exacerbation of patellofemoral pain, medial compartment DJD and additional concern patellar tendinitis.    -Start home exercise program, offered physical therapy referral to learn HEP however she wishes to start with my handouts.   -Continue HEP, consider knee sleeve   -Diclofenac refill provided so that she can initiate HEP /strengthening in a pain free manner  -Follow up 6 weeks if no substantial improvement; consider corticosteroid injection, possibly MRI depending on level of pain.    It was a pleasure seeing XeniaChristopher Lowe DO, [facility]  Primary Care Sports Medicine          Again, thank you for allowing me to participate in the care of your patient.        Sincerely,        Lokesh Lowe DO

## 2023-06-21 NOTE — PATIENT INSTRUCTIONS
Thank you for choosing [facility]    DR GUEVARA'S CLINIC LOCATIONS  [place] [address] [facility], [address]th [place]   [place], [place], [address] [place] [address]   [phone]       APPOINTMENTS: [phone]    CARE QUESTIONS: [phone],    BILLING QUESTIONS: [phone]    FAX NUMBER: [phone]        Follow up: as needed        Knee Exercises      You may do the first 7 exercises right away. You may do the rest of the exercises when the pain in your knee has decreased.     Passive knee extension: Do this exercise if you are unable to extend your knee fully. While lying on your back, place a rolled-up towel under the heel of your injured leg so the heel is about 6 inches off the ground. Relax your leg muscles and let gravity slowly straighten your knee. Try to hold this position for 2 minutes. Repeat 3 times. You may feel some discomfort while doing this exercise. Do the exercise several times a day.  This exercise can also be done while sitting in a chair with your heel on another chair or stool.     Heel slide: Sit on a firm surface with your legs straight in front of you. Slowly slide the heel of the foot on your injured side toward your buttock by pulling your knee toward your chest as you slide the heel. Return to the starting position. Do 2 sets of 15.  Standing calf stretch: Stand facing a wall with your hands on the wall at about eye level. Keep your injured leg back with your heel on the floor. Keep the other leg forward with the knee bent. Turn your back foot slightly inward (as if you were pigeon-toed). Slowly lean into the wall until you feel a stretch in the back of your calf. Hold the stretch for 15 to 30 seconds. Return to the starting position. Repeat 3 times. Do this exercise several times each day.     Hamstring stretch on wall: Lie on your back with your buttocks close to a doorway. Stretch your uninjured leg  straight out in front of you on the floor through the doorway. Raise your injured leg and rest it against the wall next to the door frame. Keep your leg as straight as possible. You should feel a stretch in the back of your thigh. Hold this position for 15 to 30 seconds. Repeat 3 times.  Straight leg raise: Lie on your back with your legs straight out in front of you. Bend the knee on your uninjured side and place the foot flat on the floor. Tighten the thigh muscle on your injured side and lift your leg about 8 inches off the floor. Keep your leg straight and your thigh muscle tight. Slowly lower your leg back down to the floor. Do 2 sets of 15.     Prone hip extension: Lie on your stomach with your legs straight out behind you. Fold your arms under your head and rest your head on your arms. Draw your belly button in towards your spine and tighten your abdominal muscles. Tighten the buttocks and thigh muscles of the leg on your injured side and lift the leg off the floor about 8 inches. Keep your leg straight. Hold for 5 seconds. Then lower your leg and relax. Do 2 sets of 15.  Clam exercise: Lie on your uninjured side with your hips and knees bent and feet together. Slowly raise your top leg toward the ceiling while keeping your heels touching each other. Hold for 2 seconds and lower slowly. Do 2 sets of 15 repetitions.     Wall squat with a ball: Stand with your back, shoulders, and head against a wall. Look straight ahead. Keep your shoulders relaxed and your feet 3 feet (90 centimeters) from the wall and shoulder's width apart. Place a soccer or basketball-sized ball behind your back. Keeping your back against the wall, slowly squat down to a 45-degree angle. Your thighs will not yet be parallel to the floor. Hold this position for 10 seconds and then slowly slide back up the wall. Repeat 10 times. Build up to 2 sets of 15.  Step-up: Stand with the foot of your injured leg on a support 3 to 5 inches (8 to 13  centimeters) high --like a small step or block of wood. Keep your other foot flat on the floor. Shift your weight onto the injured leg on the support. Straighten your injured leg as the other leg comes off the floor. Return to the starting position by bending your injured leg and slowly lowering your uninjured leg back to the floor. Do 2 sets of 15.     Knee stabilization: Wrap a piece of elastic tubing around the ankle of your uninjured leg. Tie a knot in the other end of the tubing and close it in a door at about ankle height.  Stand facing the door on the leg without tubing (your injured leg) and bend your knee slightly, keeping your thigh muscles tight. Stay in this position while you move the leg with the tubing (the uninjured leg) straight back behind you. Do 2 sets of 15.  Turn 90 degrees so the leg without tubing is closest to the door. Move the leg with tubing away from your body. Do 2 sets of 15.  Turn 90 degrees again so your back is to the door. Move the leg with tubing straight out in front of you. Do 2 sets of 15.  Turn your body 90 degrees again so the leg with tubing is closest to the door. Move the leg with tubing across your body. Do 2 sets of 15.  Hold onto a chair if you need help balancing. This exercise can be made more challenging by standing on a firm pillow or foam mat while you move the leg with tubing.     Resisted terminal knee extension: Make a loop with a piece of elastic tubing by tying a knot in both ends. Close the knot in a door at knee height. Step into the loop with your injured leg so the tubing is around the back of your knee. Lift the other foot off the ground and hold onto a chair for balance, if needed. Bend the knee with tubing about 45 degrees. Slowly straighten your leg, keeping your thigh muscle tight as you do this. Repeat 15 times. Do 2 sets of 15. If you need an easier way to do this, stand on both legs for better support while you do the exercise.  If you have access  to a wobble board, do the following exercises:               Developed by [employer].  Published by [employer].  Copyright  2014 [employer] and/or one of its subsidiaries. All rights reserved.

## 2023-08-26 DIAGNOSIS — Z86.79 HISTORY OF VENTRICULAR TACHYCARDIA: ICD-10-CM

## 2023-08-28 RX ORDER — METOPROLOL SUCCINATE 25 MG/1
25 TABLET, EXTENDED RELEASE ORAL DAILY
Qty: 90 TABLET | Refills: 0 | Status: SHIPPED | OUTPATIENT
Start: 2023-08-28 | End: 2023-09-19

## 2023-09-19 ENCOUNTER — OFFICE VISIT (OUTPATIENT)
Dept: INTERNAL MEDICINE | Facility: CLINIC | Age: 53
End: 2023-09-19
Attending: PHYSICIAN ASSISTANT
Payer: COMMERCIAL

## 2023-09-19 VITALS
OXYGEN SATURATION: 99 % | DIASTOLIC BLOOD PRESSURE: 78 MMHG | BODY MASS INDEX: 30.18 KG/M2 | HEIGHT: 66 IN | WEIGHT: 187.8 LBS | SYSTOLIC BLOOD PRESSURE: 124 MMHG | HEART RATE: 89 BPM | RESPIRATION RATE: 16 BRPM

## 2023-09-19 DIAGNOSIS — R73.01 IMPAIRED FASTING GLUCOSE: ICD-10-CM

## 2023-09-19 DIAGNOSIS — Z86.79 HISTORY OF VENTRICULAR TACHYCARDIA: ICD-10-CM

## 2023-09-19 DIAGNOSIS — Z23 NEED FOR VACCINATION: ICD-10-CM

## 2023-09-19 DIAGNOSIS — Z13.1 SCREENING FOR DIABETES MELLITUS: ICD-10-CM

## 2023-09-19 DIAGNOSIS — Z12.31 ENCOUNTER FOR SCREENING MAMMOGRAM FOR BREAST CANCER: ICD-10-CM

## 2023-09-19 DIAGNOSIS — Z13.220 SCREENING FOR CHOLESTEROL LEVEL: ICD-10-CM

## 2023-09-19 DIAGNOSIS — Z00.00 ROUTINE HISTORY AND PHYSICAL EXAMINATION OF ADULT: Primary | ICD-10-CM

## 2023-09-19 DIAGNOSIS — E78.1 HYPERTRIGLYCERIDEMIA: ICD-10-CM

## 2023-09-19 DIAGNOSIS — Z23 NEED FOR PROPHYLACTIC VACCINATION AND INOCULATION AGAINST INFLUENZA: ICD-10-CM

## 2023-09-19 PROCEDURE — 99213 OFFICE O/P EST LOW 20 MIN: CPT | Mod: 25 | Performed by: INTERNAL MEDICINE

## 2023-09-19 PROCEDURE — 90472 IMMUNIZATION ADMIN EACH ADD: CPT | Performed by: INTERNAL MEDICINE

## 2023-09-19 PROCEDURE — 90750 HZV VACC RECOMBINANT IM: CPT | Performed by: INTERNAL MEDICINE

## 2023-09-19 PROCEDURE — 90471 IMMUNIZATION ADMIN: CPT | Performed by: INTERNAL MEDICINE

## 2023-09-19 PROCEDURE — 90682 RIV4 VACC RECOMBINANT DNA IM: CPT | Performed by: INTERNAL MEDICINE

## 2023-09-19 PROCEDURE — 99396 PREV VISIT EST AGE 40-64: CPT | Mod: 25 | Performed by: INTERNAL MEDICINE

## 2023-09-19 RX ORDER — METOPROLOL SUCCINATE 25 MG/1
25 TABLET, EXTENDED RELEASE ORAL DAILY
Qty: 90 TABLET | Refills: 0 | Status: SHIPPED | OUTPATIENT
Start: 2023-09-19 | End: 2023-11-26

## 2023-09-19 ASSESSMENT — ENCOUNTER SYMPTOMS
ARTHRALGIAS: 0
NERVOUS/ANXIOUS: 0
DIZZINESS: 0
SHORTNESS OF BREATH: 0
JOINT SWELLING: 0
EYE PAIN: 0
SORE THROAT: 0
CONSTIPATION: 0
PARESTHESIAS: 0
MYALGIAS: 0
DIARRHEA: 0
NAUSEA: 0
HEADACHES: 0
PALPITATIONS: 0
WEAKNESS: 0
DYSURIA: 0
HEARTBURN: 0
HEMATURIA: 0
FREQUENCY: 0
FEVER: 0
COUGH: 0
HEMATOCHEZIA: 0
CHILLS: 0
BREAST MASS: 0
ABDOMINAL PAIN: 0

## 2023-09-19 NOTE — PATIENT INSTRUCTIONS
Flu shot today.    Shingrix #1 today. #2 in 2-6 months.    Please schedule fasting labs and mammogram on the way out today.    Refill of Toprol sent in.

## 2023-09-19 NOTE — PROGRESS NOTES
ASSESSMENT/PLAN                                                       (Z00.00) Routine history and physical examination of adult  (primary encounter diagnosis)  Comment: PMH, PSH, FH, SH, medications, allergies, immunizations, and preventative health measures reviewed and updated as appropriate.  Plan: see below for plans.      (Z12.31) Encounter for screening mammogram for breast cancer  Plan: screening mammogram ordered - patient to schedule.     (E78.1) Hypertriglyceridemia  (Z13.220) Screening for cholesterol level  (Z13.1) Screening for diabetes mellitus  (R73.01) Impaired fasting glucose  Plan: fasting labs ordered - patient to schedule.     (Z23) Need for vaccination  Plan: Shingrix #1 given today; #2 and 2-6 months.    (Z23) Need for prophylactic vaccination and inoculation against influenza  Plan: flu shot given today.    (Z86.79) History of ventricular tachycardia  Comment: well-controlled on current regimen.    Plan: continue present management; refills provided.     Carol Yung MD   [address] [address]  T: [phone], F: [phone]    SUBJECTIVE                                                      Xenia Snow is a very pleasant 53 year old female who presents for a physical.    ROS:  Constitutional: no unintentional weight loss or gain reported; no fevers, chills, or sweats reported  Cardiovascular: no chest pain, palpitations, or edema reported  Respiratory: no cough, wheezing, shortness of breath, or dyspnea on exertion reported  Gastrointestinal: no nausea, vomiting, constipation, diarrhea, or abdominal pain reported  Genitourinary: no urinary frequency, urgency, dysuria, or hematuria reported  Integumentary: no rash or pruritus reported  Musculoskeletal: no back pain, muscle pain, joint pain, or joint swelling reported  Neurologic: no focal weakness, numbness, or tingling reported  Hematologic: no easy bruising or bleeding  reported  Endocrine: no heat or cold intolerance reported; no polyuria or polydipsia reported  Psychiatric: no anxiety or depression reported    Past Medical History:   Diagnosis Date    History of ventricular tachycardia     on Toprol    Hypertriglyceridemia     Impaired fasting glucose     Obesity (BMI 30-39.9)      Past Surgical History:   Procedure Laterality Date    COLONOSCOPY N/A 8/25/2022    Procedure: COLONOSCOPY with polypectomies using exacto cold snare;  Surgeon: Tucker Sánchez MD;  Location:  GI    DILATION AND CURETTAGE      DILATION AND EVACUATION      ENTEROSCOPY SMALL BOWEL N/A 7/18/2022    Procedure: ENTEROSCOPY;  Surgeon: Silverio Ziegler MD;  Location: [facility] OR    ESOPHAGOSCOPY, GASTROSCOPY, DUODENOSCOPY (EGD), COMBINED N/A 7/18/2022    Procedure: ESOPHAGOGASTRODUODENOSCOPY, WITH FINE NEEDLE ASPIRATION BIOPSY, WITH ENDOSCOPIC ULTRASOUND GUIDANCE;  Surgeon: Silverio Ziegler MD;  Location: [facility] OR    LAPAROSCOPIC APPENDECTOMY  2018    LAPAROSCOPIC CHOLECYSTECTOMY  2017     Family History   Problem Relation Age of Onset    Hypertension Mother     Hyperlipidemia Mother     Breast Cancer Mother         post-menopausal    Skin Cancer Father         unknown types    Pacemaker Father     Valvular heart disease Father     Coronary Artery Disease Maternal Grandmother         s/p CABG later in life    Myocardial Infarction Maternal Grandfather         later in life    Breast Cancer Paternal Grandmother         post-menopausal    Myocardial Infarction Maternal Uncle         later in life    Rheumatoid Arthritis Paternal Aunt     Diabetes No family hx of     Coronary Artery Disease Early Onset No family hx of     Cerebrovascular Disease No family hx of     Ovarian Cancer No family hx of     Colon Cancer No family hx of      Social History     Occupational History    Occupation: Consulting -    Tobacco Use    Smoking status: Never    Smokeless tobacco: Never   Vaping Use    Vaping Use: Never used  "  Substance and Sexual Activity    Alcohol use: Never    Drug use: Never    Sexual activity: Yes     Partners: Female     Birth control/protection: Male Surgical   Social History Narrative    .    4 kids (3 adult, 1 high school).    Exercising sometimes.     Allergies   Allergen Reactions    Amitiza [Lubiprostone] GI Disturbance     Current Outpatient Medications   Medication Sig    chlorpheniramine (CHLOR-TRIMETON) 4 MG tablet Take 4 mg by mouth every 6 hours as needed for allergies or rhinitis    ibuprofen (ADVIL/MOTRIN) 200 MG tablet Take 400 mg by mouth every 4 hours as needed for mild pain    loratadine (CLARITIN) 10 MG tablet Take 1 tablet (10 mg) by mouth daily    metoprolol succinate ER (TOPROL XL) 25 MG 24 hr tablet Take 1 tablet (25 mg) by mouth daily     Immunization History   Administered Date(s) Administered    COVID-19 MONOVALENT 12+ (Pfizer) 04/20/2021, 05/18/2021, 01/11/2022    Influenza Vaccine 18-64 (Flublok) 09/19/2023    Influenza Vaccine >6 months (Alfuria,Fluzone) 12/13/2019, 10/30/2020, 01/11/2022    TDAP Vaccine (Adacel) 08/03/2019    Zoster recombinant adjuvanted (SHINGRIX) 09/19/2023     PREVENTATIVE HEALTH                                                      BMI: obese  Blood pressure: well-controlled on current regimen   Breast CA screening: DUE  Cervical CA screening: up to date   Colon CA screening: up to date   Lung CA screening: n/a   Dexa: not medically indicated at this time   Screening cholesterol: DUE  Screening diabetes: DUE  STD testing: no risk factors present  Alcohol misuse screening: alcohol use reviewed - no intervention indicated at this time  Immunizations: reviewed;  flu shot and Shingrix series DUE    OBJECTIVE                                                      /78   Pulse 89   Resp 16   Ht 1.676 m (5' 6\")   Wt 85.2 kg (187 lb 12.8 oz)   SpO2 99%   BMI 30.31 kg/m    Constitutional: well-appearing  Head, Ears, and Eyes: normocephalic; normal external " auditory canal and pinna; tympanic membranes visualized and normal; normal lids and conjunctivae  Neck: supple, symmetric, no thyromegaly or lymphadenopathy  Respiratory: normal respiratory effort; clear to auscultation bilaterally  Cardiovascular: regular rate and rhythm; no edema  Gastrointestinal: soft, non-tender, and non-distended; no organomegaly or masses  Musculoskeletal: normal gait and station  Psych: normal judgment and insight; normal mood and affect; recent and remote memory intact    ---  (Note was completed, in part, with Open [employer] voice-recognition software. Documentation was reviewed, but some grammatical, spelling, and word errors may remain.)

## 2023-11-17 ENCOUNTER — VIRTUAL VISIT (OUTPATIENT)
Dept: INTERNAL MEDICINE | Facility: CLINIC | Age: 53
End: 2023-11-17
Payer: COMMERCIAL

## 2023-11-17 DIAGNOSIS — M54.50 ACUTE BILATERAL LOW BACK PAIN WITHOUT SCIATICA: Primary | ICD-10-CM

## 2023-11-17 DIAGNOSIS — M54.6 ACUTE BILATERAL THORACIC BACK PAIN: ICD-10-CM

## 2023-11-17 PROCEDURE — 99213 OFFICE O/P EST LOW 20 MIN: CPT | Mod: VID | Performed by: PHYSICIAN ASSISTANT

## 2023-11-17 RX ORDER — METHOCARBAMOL 500 MG/1
500 TABLET, FILM COATED ORAL 3 TIMES DAILY
Qty: 30 TABLET | Refills: 1 | Status: SHIPPED | OUTPATIENT
Start: 2023-11-17

## 2023-11-17 RX ORDER — METHYLPREDNISOLONE 4 MG
TABLET, DOSE PACK ORAL
Qty: 21 TABLET | Refills: 0 | Status: SHIPPED | OUTPATIENT
Start: 2023-11-17

## 2023-11-17 NOTE — PROGRESS NOTES
"Xenia is a 53 year old who is being evaluated via a billable video visit.      How would you like to obtain your AVS? MyChart  If the video visit is dropped, the invitation should be resent by: Text to cell phone: [phone]  Will anyone else be joining your video visit? No          Assessment & Plan     Acute bilateral low back pain without sciatica    - Physical Therapy Referral; Future  - methocarbamol (ROBAXIN) 500 MG tablet; Take 1 tablet (500 mg) by mouth 3 times daily  - methylPREDNISolone (MEDROL DOSEPAK) 4 MG tablet therapy pack; Follow Package Directions    Acute bilateral thoracic back pain    - Physical Therapy Referral; Future  - methocarbamol (ROBAXIN) 500 MG tablet; Take 1 tablet (500 mg) by mouth 3 times daily  - methylPREDNISolone (MEDROL DOSEPAK) 4 MG tablet therapy pack; Follow Package Directions             BMI:   Estimated body mass index is 30.31 kg/m  as calculated from the following:    Height as of 9/19/23: 1.676 m (5' 6\").    Weight as of 9/19/23: 85.2 kg (187 lb 12.8 oz).   Weight management plan: Patient was referred to their PCP to discuss a diet and exercise plan.    Reviewed treatment   Continue heating/ice   Acute spine PT referral done      Beatriz Rios PA-C  [facility]    Awa Michaels is a 53 year old, presenting for the following health issues:  No chief complaint on file.      HPI     Pain History:  When did you first notice your pain? 4 days   Have you seen anyone else for your pain? No  How has your pain affected your ability to work? Pain does not limit ability to work   What type of work do you or did you do? Work from home  Where in your body do you have pain? Back Pain  Onset/Duration: 4 days  Description:   Location of pain: low back both, middle of back both, and upper back both  Character of pain: sharp, stabbing, fullness, cramping, and constant  Pain radiation: none  New numbness or weakness in legs, not " attributed to pain: no   Intensity: Currently 5/10, At its worst 8/10  Progression of Symptoms: same  History:   Specific cause: yard work, noticed the next day    History of back problems: recurrent self limited episodes of low back pain in the past and scoliosis as a kid, skiing accident in high school  Any previous MRI or X-rays: Yes--unsure when/where  Sees a specialist for back pain: No  Alleviating factors:   Improved by: aleve, tiger balm and heat    Precipitating factors:  Worsened by: certain movements  Therapies tried and outcome: heat/tiger balm/aleve    Accompanying Signs & Symptoms:  Risk of Fracture: None  Risk of Cauda Equina: None  Risk of Infection: None  Risk of Cancer: None  Risk of Ankylosing Spondylitis: Onset at age <35, male, AND morning back stiffness  no                 Review of Systems         Objective           Vitals:  No vitals were obtained today due to virtual visit.    Physical Exam   GENERAL: Healthy, alert and no distress  EYES: Eyes grossly normal to inspection.  No discharge or erythema, or obvious scleral/conjunctival abnormalities.  RESP: No audible wheeze, cough, or visible cyanosis.  No visible retractions or increased work of breathing.    SKIN: Visible skin clear. No significant rash, abnormal pigmentation or lesions.  NEURO: Cranial nerves grossly intact.  Mentation and speech appropriate for age.  PSYCH: Mentation appears normal, affect normal/bright, judgement and insight intact, normal speech and appearance well-groomed.                Video-Visit Details    Type of service:  Video Visit     Originating Location (pt. Location): Home    Distant Location (provider location):  On-site  Platform used for Video Visit: Ous[employer]e

## 2023-11-20 ENCOUNTER — THERAPY VISIT (OUTPATIENT)
Dept: PHYSICAL THERAPY | Facility: CLINIC | Age: 53
End: 2023-11-20
Attending: PHYSICIAN ASSISTANT
Payer: COMMERCIAL

## 2023-11-20 DIAGNOSIS — M54.50 ACUTE BILATERAL LOW BACK PAIN WITHOUT SCIATICA: ICD-10-CM

## 2023-11-20 DIAGNOSIS — M54.6 ACUTE BILATERAL THORACIC BACK PAIN: ICD-10-CM

## 2023-11-20 PROCEDURE — 97530 THERAPEUTIC ACTIVITIES: CPT | Mod: GP | Performed by: PHYSICAL THERAPIST

## 2023-11-20 PROCEDURE — 97161 PT EVAL LOW COMPLEX 20 MIN: CPT | Mod: GP | Performed by: PHYSICAL THERAPIST

## 2023-11-20 PROCEDURE — 97110 THERAPEUTIC EXERCISES: CPT | Mod: GP | Performed by: PHYSICAL THERAPIST

## 2023-11-20 NOTE — PROGRESS NOTES
"PHYSICAL THERAPY EVALUATION  Type of Visit: Evaluation    See electronic medical record for Abuse and Falls Screening details.    Subjective       Presenting condition or subjective complaint:    Date of onset:      Relevant medical history: Overweight   Dates & types of surgery:      Prior diagnostic imaging/testing results:   none recent   Prior therapy history for the same diagnosis, illness or injury: Yes 2021    Prior Level of Function  Transfers: Independent  Ambulation: Independent  ADL: Independent      Living Environment  Social support: With family members   Type of home: 2-story   Stairs to enter the home: Yes 3 Is there a railing: Yes   Ramp: No   Stairs inside the home: Yes 6 Is there a railing: Yes   Help at home: Home management tasks (cooking, cleaning); Home and Yard maintenance tasks  Equipment owned: [employer]     Employment: Yes  - FT, works from home in recliner/laptop  Hobbies/Interests: reading, crafts    Patient goals for therapy: laundry, regular household chores    Patient has history of intermittent LBP, last exacerbation 1 year ago after wrapping gifts, initial injury was with skiing in high school.  11-14-23 she spent 20' sweeping leaves off the deck - pain started next a.m. mid thoracic to LB bilaterally and was unable to move her back by the end of the day.  She was unable to relieve with heat and medication, went into clinic 11-17-23 and was started on muscle relaxer and steroid dose pack.  Prior to dose pack pain was ranging 5-7/10, now 1-4/10, describes as \"seizing\".  Pain is best in the a.m., worse as the day goes on.  Patient works from home, laptop in recliner since covid.  Symptoms increased with standing/walking immediately, bending forward, twisting, laughing, transition sit-stand.  Symptoms decrease with steroid, moving, recliner.       Objective   Posture/alignment  Sitting:  Fair  Standing:  decreased lumbar lordosis, slight scoliosis with covexity left "     Gait  Assistive device:  none  Deviation:  none    Lumbar AROM  Flexion:  hands to knees, guarded/pain  Extension:  25%       Lumbar Dermatomes  WNL/symetrical to light touch    Lumbar Myotomes  WNL/symetrical      Slump test  Right  negative  Left  negative    Repeated movement testing  Symptoms prior to test movements: Mild discomfort mid thoracic to lumbar  Correction of sitting posture:  no change  Prone:  slight discomfort across the LB  REIL:  increase LBP  Prone over 1 pillow:  decrease pain/better  REIL over 1 pillow:  no effect  Prone over 2 pillows:  no effect  REIL over 2 pillows:  no effect            Assessment & Plan   CLINICAL IMPRESSIONS  Medical Diagnosis: B LB and thoracic pain    Treatment Diagnosis: B LB and thoracic pain   Impression/Assessment: Patient is a 53 year old female with lumbar and thoracic complaints.  The following significant findings have been identified: Pain, Decreased ROM/flexibility, Inflammation, Decreased activity tolerance, and Impaired posture. These impairments interfere with their ability to perform self care tasks, work tasks, recreational activities, household chores, household mobility, and community mobility as compared to previous level of function.     Clinical Decision Making (Complexity):  Clinical Presentation: Stable/Uncomplicated  Clinical Presentation Rationale: based on medical and personal factors listed in PT evaluation  Clinical Decision Making (Complexity): Low complexity    PLAN OF CARE  Treatment Interventions:  Interventions: Neuromuscular Re-education, Therapeutic Activity, Therapeutic Exercise, Self-Care/Home Management    Long Term Goals     PT Goal 1  Goal Identifier: Sitting  Goal Description: Patient will be able to sit 1 hour without pain, no pain with transition to stand  Rationale: to maximize safety and independence with performance of ADLs and functional tasks;to maximize safety and independence with transportation;to maximize safety  and independence with self cares  Target Date: 01/15/24      Frequency of Treatment: 1x/week for 4 weeks then 2x/month for 2 visits  Duration of Treatment: 8 weeks      Education Assessment:        Risks and benefits of evaluation/treatment have been explained.   Patient/Family/caregiver agrees with Plan of Care.     Evaluation Time:     PT Eval, Low Complexity Minutes (17870): 23       Signing Clinician: Lashell Monsivais PT

## 2023-11-26 ENCOUNTER — MYC REFILL (OUTPATIENT)
Dept: INTERNAL MEDICINE | Facility: CLINIC | Age: 53
End: 2023-11-26
Payer: COMMERCIAL

## 2023-11-26 DIAGNOSIS — Z86.79 HISTORY OF VENTRICULAR TACHYCARDIA: ICD-10-CM

## 2023-11-27 ENCOUNTER — ALLIED HEALTH/NURSE VISIT (OUTPATIENT)
Dept: INTERNAL MEDICINE | Facility: CLINIC | Age: 53
End: 2023-11-27
Payer: COMMERCIAL

## 2023-11-27 DIAGNOSIS — Z23 NEED FOR VACCINATION: Primary | ICD-10-CM

## 2023-11-27 PROCEDURE — 90750 HZV VACC RECOMBINANT IM: CPT

## 2023-11-27 PROCEDURE — 90471 IMMUNIZATION ADMIN: CPT

## 2023-11-27 RX ORDER — METOPROLOL SUCCINATE 25 MG/1
25 TABLET, EXTENDED RELEASE ORAL DAILY
Qty: 90 TABLET | Refills: 1 | Status: SHIPPED | OUTPATIENT
Start: 2023-11-27 | End: 2023-12-01

## 2023-11-30 ENCOUNTER — THERAPY VISIT (OUTPATIENT)
Dept: PHYSICAL THERAPY | Facility: CLINIC | Age: 53
End: 2023-11-30
Payer: COMMERCIAL

## 2023-11-30 DIAGNOSIS — M54.50 BILATERAL LOW BACK PAIN WITHOUT SCIATICA: Primary | ICD-10-CM

## 2023-11-30 DIAGNOSIS — M54.6 BILATERAL THORACIC BACK PAIN: ICD-10-CM

## 2023-11-30 PROCEDURE — 97530 THERAPEUTIC ACTIVITIES: CPT | Mod: GP | Performed by: PHYSICAL THERAPIST

## 2023-11-30 PROCEDURE — 97110 THERAPEUTIC EXERCISES: CPT | Mod: 59 | Performed by: PHYSICAL THERAPIST

## 2023-12-01 DIAGNOSIS — Z86.79 HISTORY OF VENTRICULAR TACHYCARDIA: ICD-10-CM

## 2023-12-01 RX ORDER — METOPROLOL SUCCINATE 25 MG/1
25 TABLET, EXTENDED RELEASE ORAL DAILY
Qty: 90 TABLET | Refills: 3 | Status: SHIPPED | OUTPATIENT
Start: 2023-12-01

## 2023-12-01 NOTE — TELEPHONE ENCOUNTER
Pt requesting to modify metoprolol succinate ER (TOPROL XL) 25 MG 24 hr tablet  order to 1 yr refill instead of 6 months.     Pt advised to contact clinic when refill needed. Routing to provider as fyi.    Please review and send new rx if appropriate.

## 2023-12-02 ENCOUNTER — LAB (OUTPATIENT)
Dept: LAB | Facility: CLINIC | Age: 53
End: 2023-12-02
Payer: COMMERCIAL

## 2023-12-02 DIAGNOSIS — R73.01 IMPAIRED FASTING GLUCOSE: ICD-10-CM

## 2023-12-02 DIAGNOSIS — Z13.220 SCREENING FOR CHOLESTEROL LEVEL: ICD-10-CM

## 2023-12-02 DIAGNOSIS — E78.1 HYPERTRIGLYCERIDEMIA: ICD-10-CM

## 2023-12-02 DIAGNOSIS — Z13.1 SCREENING FOR DIABETES MELLITUS: ICD-10-CM

## 2023-12-02 LAB
ALBUMIN SERPL BCG-MCNC: 4.1 G/DL (ref 3.5–5.2)
ALP SERPL-CCNC: 60 U/L (ref 40–150)
ALT SERPL W P-5'-P-CCNC: 17 U/L (ref 0–50)
ANION GAP SERPL CALCULATED.3IONS-SCNC: 9 MMOL/L (ref 7–15)
AST SERPL W P-5'-P-CCNC: 20 U/L (ref 0–45)
BILIRUB SERPL-MCNC: 0.2 MG/DL
BUN SERPL-MCNC: 14.8 MG/DL (ref 6–20)
CALCIUM SERPL-MCNC: 9.2 MG/DL (ref 8.6–10)
CHLORIDE SERPL-SCNC: 105 MMOL/L (ref 98–107)
CHOLEST SERPL-MCNC: 208 MG/DL
CREAT SERPL-MCNC: 0.95 MG/DL (ref 0.51–0.95)
DEPRECATED HCO3 PLAS-SCNC: 25 MMOL/L (ref 22–29)
EGFRCR SERPLBLD CKD-EPI 2021: 72 ML/MIN/1.73M2
GLUCOSE SERPL-MCNC: 123 MG/DL (ref 70–99)
HBA1C MFR BLD: 5.7 % (ref 0–5.6)
HDLC SERPL-MCNC: 40 MG/DL
LDLC SERPL CALC-MCNC: 119 MG/DL
NONHDLC SERPL-MCNC: 168 MG/DL
POTASSIUM SERPL-SCNC: 4.4 MMOL/L (ref 3.4–5.3)
PROT SERPL-MCNC: 7 G/DL (ref 6.4–8.3)
SODIUM SERPL-SCNC: 139 MMOL/L (ref 135–145)
TRIGL SERPL-MCNC: 244 MG/DL

## 2023-12-02 PROCEDURE — 80061 LIPID PANEL: CPT

## 2023-12-02 PROCEDURE — 36415 COLL VENOUS BLD VENIPUNCTURE: CPT

## 2023-12-02 PROCEDURE — 80053 COMPREHEN METABOLIC PANEL: CPT

## 2023-12-02 PROCEDURE — 83036 HEMOGLOBIN GLYCOSYLATED A1C: CPT

## 2024-01-18 ENCOUNTER — THERAPY VISIT (OUTPATIENT)
Dept: PHYSICAL THERAPY | Facility: CLINIC | Age: 54
End: 2024-01-18
Payer: COMMERCIAL

## 2024-01-18 DIAGNOSIS — G89.29 CHRONIC BILATERAL LOW BACK PAIN WITHOUT SCIATICA: Primary | ICD-10-CM

## 2024-01-18 DIAGNOSIS — G89.29 CHRONIC BILATERAL THORACIC BACK PAIN: ICD-10-CM

## 2024-01-18 DIAGNOSIS — M54.6 CHRONIC BILATERAL THORACIC BACK PAIN: ICD-10-CM

## 2024-01-18 DIAGNOSIS — M54.50 CHRONIC BILATERAL LOW BACK PAIN WITHOUT SCIATICA: Primary | ICD-10-CM

## 2024-01-18 PROCEDURE — 97530 THERAPEUTIC ACTIVITIES: CPT | Mod: GP | Performed by: PHYSICAL THERAPIST

## 2024-01-18 PROCEDURE — 97110 THERAPEUTIC EXERCISES: CPT | Mod: GP | Performed by: PHYSICAL THERAPIST

## 2024-01-22 NOTE — PROGRESS NOTES
"    DISCHARGE  Reason for Discharge: After visit #3 patient cancelled all future visits due to improvement in condition.    Equipment Issued: nonef    Discharge Plan: Patient to continue home program.   01/18/24 0500   Appointment Info   Signing clinician's name / credentials Lashell Lynch PT   Total/Authorized Visits 6   Visits Used 3   Medical Diagnosis B LB and thoracic pain   PT Tx Diagnosis B LB and thoracic pain   Other pertinent information chronic history since HS skiing injury   Progress Note/Certification   Therapy Frequency 1x/week for 4 weeks then 2x/month for 2 visits   Predicted Duration 8 weeks   Progress Note Due Date 01/19/24   PT Goal 1   Goal Identifier Sitting   Goal Description Patient will be able to sit 1 hour without pain, no pain with transition to stand   Rationale to maximize safety and independence with performance of ADLs and functional tasks;to maximize safety and independence with transportation;to maximize safety and independence with self cares   Goal Progress met most days, antipate being fully met with continuation/correction of HEP - extend goal date until next visit   Target Date 02/01/24   Subjective Report   Subjective Report Returns after ~7 weeks.  Can go days, but not a week without LBP, LBP up to 4/10 at times, no knee pain for weeks.  Able to kneel without symptoms consistently. Doing REIL at least 1x/day on bed, sometimes 2. Was worried about knees getting on floor previously, but now will transition to ex on floor.  Varying work position from sit-stand station to \"bobble chair\", not consistently using lumbar roll.  Utilizing  principles consistently.   Objective Measures   Objective Measures Objective Measure 1   Objective Measure 1   Objective Measure Lumbar flexion AROM WNL, extension 90% without sxs.  Good sitting posture without cuing.   Treatment Interventions (PT)   Interventions Therapeutic Procedure/Exercise;Therapeutic Activity   Therapeutic " Procedure/Exercise   Therapeutic Procedures: strength, endurance, ROM, flexibillity minutes (96122) 25   Ther Proc 1 0/10 1)review positioning of lumbar roll 2)prone - 0/10 3)REIL - vcs/vscs/mcs for getting to end ROM, avoiding compensation and neutral cervical - NE 3)REIL with stab belt to work on getting to end ROM 4)REIL with pt OP - vcs/vscs - NE.  Patient to resume REIL 5-6x/day until sx-free 1 week, add pt OP last 3-4 reps after mid-day.  When sx-free one week to maintenance of REIL 1x/day.  Trial ALYSSIA with hand support - can use when unable to lie down and interrupt prolonged sitting.  Reminder to have  use towel for stabilization on hips every/every other day 1x   Skilled Intervention Instruction in exercise to increase ROM and strength to decrease pain and increase function   Therapeutic Activity   Therapeutic Activities: dynamic activities to improve functional performance minutes (19536) 10   Ther Act 1 further education re: POC and expectations, importance of/how to get to sx-free , reviewed maintenance and what to do if sxs return.   Ther Act 2 lumbar roll - encouraged consistency   Ther Act 2 - Details brief review 's bow   Skilled Intervention Education and exercise to decrease pain and increase function   Plan   Plan for next session pt may cancel next appt if sx-free/no questions   Total Session Time   Timed Code Treatment Minutes 35   Total Treatment Time (sum of timed and untimed services) 35         Referring Provider:  Beatriz Stephen

## 2024-01-29 ENCOUNTER — THERAPY VISIT (OUTPATIENT)
Dept: PHYSICAL THERAPY | Facility: CLINIC | Age: 54
End: 2024-01-29
Payer: COMMERCIAL

## 2024-01-29 DIAGNOSIS — M54.6 CHRONIC BILATERAL THORACIC BACK PAIN: ICD-10-CM

## 2024-01-29 DIAGNOSIS — G89.29 CHRONIC BILATERAL LOW BACK PAIN WITHOUT SCIATICA: Primary | ICD-10-CM

## 2024-01-29 DIAGNOSIS — G89.29 CHRONIC BILATERAL THORACIC BACK PAIN: ICD-10-CM

## 2024-01-29 DIAGNOSIS — M54.50 CHRONIC BILATERAL LOW BACK PAIN WITHOUT SCIATICA: Primary | ICD-10-CM

## 2024-01-29 PROCEDURE — 97530 THERAPEUTIC ACTIVITIES: CPT | Mod: GP | Performed by: PHYSICAL THERAPIST

## 2024-01-29 PROCEDURE — 97110 THERAPEUTIC EXERCISES: CPT | Mod: GP | Performed by: PHYSICAL THERAPIST

## 2024-03-18 NOTE — PROGRESS NOTES
"    DISCHARGE  Reason for Discharge: Patient was to return if was not continuing to progress.  No further appointments were schedule.  Will discharge chart at this time.     Equipment Issued: none    Discharge Plan: Patient to continue home program.   01/29/24 0500   Appointment Info   Signing clinician's name / credentials Lashell Monsivais PT   Total/Authorized Visits 6   Visits Used 4   Medical Diagnosis B LB and thoracic pain   PT Tx Diagnosis B LB and thoracic pain   Other pertinent information chronic history since HS skiing injury   Progress Note/Certification   Therapy Frequency 1x/week for 4 weeks then 2x/month for 2 visits   Predicted Duration 8 weeks   Progress Note Due Date 01/19/24   PT Goal 1   Goal Identifier Sitting   Goal Description Patient will be able to sit 1 hour without pain, no pain with transition to stand   Rationale to maximize safety and independence with performance of ADLs and functional tasks;to maximize safety and independence with transportation;to maximize safety and independence with self cares   Goal Progress met most days, antipate being fully met with continuation/correction of HEP - extend goal date until next visit   Target Date 02/01/24   Subjective Report   Subjective Report Patient had exacerbation 1-23-24.  MyCharted and was given recommendations.  Pain was LB and bilateral hips 3-7/10 and was having difficulty moving.  She did add some of her \"old exercises\"/movement (ie pelvic tilts), prone and then able to resrtart REIL.  She was interrupting sitting more.  1 week later she is doing much better.  Now 0-4/10, no \"zinging\", localized to central low spine.  Doing REIL variable times/day, up to 4x/day (in addition to \"other\" exercise).   Objective Measure 1   Objective Measure Lumbar AROM extension 75%, no increase sxs - pt fearful, then surprised at no sxs, increase to 90% with reps   Treatment Interventions (PT)   Interventions Therapeutic Procedure/Exercise;Therapeutic " "Activity   Therapeutic Procedure/Exercise   Therapeutic Procedures: strength, endurance, ROM, flexibility minutes (80336) 30   Ther Proc 1 1/10 central LB 1)ALYSSIA - NE 2)prone - sxs nil 3)REIL - vcs for technique, getting to end ROM - no sxs 3)REIL with pt OP - corrected - NE.  To do REIL 5-6x/day consistently until sx-free for at least 7 days, add pt OP last 3-5 reps after mid-day.   Skilled Intervention Instruction in exercise to increase ROM and strength to decrease pain and increase function   Ther Proc 1 - Details HOLD on all other LB exercises for now from previous - ie pelvic tilts, DAVE, etc.  and went through rationale   Therapeutic Activity   Therapeutic Activities: dynamic activities to improve functional performance minutes (27917) 10   Ther Act 1 education re: POC, rationale for HEP and avoiding \"general\" LB exercise - used cut knuckle analogy   Ther Act 1 - Details Education/re-education re: consistency of HEP until sx-free at least 7 days, then progressing to maintenanca   Ther Act 2 lumbar roll - cont using consistently   Ther Act 2 - Details brief review golfers' lift and waiters bow   Skilled Intervention Education and exercise to decrease pain and increase function   Plan   Plan for next session pt may cancel next appt if sx-free/no questions   Total Session Time   Timed Code Treatment Minutes 40   Total Treatment Time (sum of timed and untimed services) 40         Referring Provider:  Beatriz Stephen    "

## 2024-05-05 ENCOUNTER — HEALTH MAINTENANCE LETTER (OUTPATIENT)
Age: 54
End: 2024-05-05

## 2024-05-15 ENCOUNTER — ANCILLARY PROCEDURE (OUTPATIENT)
Dept: MAMMOGRAPHY | Facility: CLINIC | Age: 54
End: 2024-05-15
Attending: INTERNAL MEDICINE
Payer: COMMERCIAL

## 2024-05-15 DIAGNOSIS — Z12.31 ENCOUNTER FOR SCREENING MAMMOGRAM FOR BREAST CANCER: ICD-10-CM

## 2024-05-15 DIAGNOSIS — Z12.31 VISIT FOR SCREENING MAMMOGRAM: ICD-10-CM

## 2024-05-15 PROCEDURE — 77067 SCR MAMMO BI INCL CAD: CPT | Mod: TC | Performed by: RADIOLOGY

## 2024-05-15 PROCEDURE — 77063 BREAST TOMOSYNTHESIS BI: CPT | Mod: TC | Performed by: RADIOLOGY

## 2024-11-16 ENCOUNTER — OFFICE VISIT (OUTPATIENT)
Dept: URGENT CARE | Facility: URGENT CARE | Age: 54
End: 2024-11-16
Payer: COMMERCIAL

## 2024-11-16 VITALS
SYSTOLIC BLOOD PRESSURE: 128 MMHG | TEMPERATURE: 98 F | OXYGEN SATURATION: 98 % | HEART RATE: 81 BPM | DIASTOLIC BLOOD PRESSURE: 80 MMHG

## 2024-11-16 DIAGNOSIS — R05.9 COUGH, UNSPECIFIED TYPE: Primary | ICD-10-CM

## 2024-11-16 DIAGNOSIS — J45.909 MODERATE ASTHMA, UNSPECIFIED WHETHER COMPLICATED, UNSPECIFIED WHETHER PERSISTENT: ICD-10-CM

## 2024-11-16 DIAGNOSIS — R06.2 WHEEZING: ICD-10-CM

## 2024-11-16 PROCEDURE — 99213 OFFICE O/P EST LOW 20 MIN: CPT | Performed by: FAMILY MEDICINE

## 2024-11-16 RX ORDER — CETIRIZINE HYDROCHLORIDE 10 MG/1
10 TABLET ORAL ONCE
Status: COMPLETED | OUTPATIENT
Start: 2024-11-16 | End: 2024-11-16

## 2024-11-16 RX ORDER — ALBUTEROL SULFATE 90 UG/1
2 INHALANT RESPIRATORY (INHALATION) EVERY 6 HOURS PRN
Qty: 18 G | Refills: 0 | Status: SHIPPED | OUTPATIENT
Start: 2024-11-16

## 2024-11-16 RX ORDER — PREDNISONE 50 MG/1
50 TABLET ORAL DAILY
Status: ACTIVE | OUTPATIENT
Start: 2024-11-16

## 2024-11-16 RX ORDER — PREDNISONE 20 MG/1
20 TABLET ORAL 2 TIMES DAILY
Qty: 8 TABLET | Refills: 0 | Status: SHIPPED | OUTPATIENT
Start: 2024-11-17 | End: 2024-11-21

## 2024-11-16 RX ADMIN — CETIRIZINE HYDROCHLORIDE 10 MG: 10 TABLET ORAL at 09:39

## 2024-11-16 RX ADMIN — PREDNISONE 50 MG: 50 TABLET ORAL at 09:40

## 2024-11-16 NOTE — PROGRESS NOTES
Chief Complaint   Patient presents with    Asthma     Flare up since last night - FARHANA Michaels was seen today for asthma.    Diagnoses and all orders for this visit:    Cough, unspecified type    Wheezing  -     albuterol (PROAIR HFA/PROVENTIL HFA/VENTOLIN HFA) 108 (90 Base) MCG/ACT inhaler; Inhale 2 puffs into the lungs every 6 hours as needed.  -     predniSONE (DELTASONE) tablet 50 mg  -     cetirizine (zyrTEC) tablet 10 mg  -     predniSONE (DELTASONE) 20 MG tablet; Take 1 tablet (20 mg) by mouth 2 times daily for 4 days.    Moderate asthma, unspecified whether complicated, unspecified whether persistent      ASSESSMENT:      Cough, unspecified type  Wheezing    PLAN:   See orders in epic.   She was given 50 mg of prednisone in the clinic and also  Zyrtec tablet and advised to complete the course of the prednisone starting tomorrow.  Continue on albuterol, inhalor as needed every 4 hrs  for wheezing   Complete course of prednisone starting tomorrow for next 4 days   Patient was stable at the time of the discharge.  Continue on your regular medications   Follow up if symptoms worsens      SUBJECTIVE:  Xenia Snow is a 54 year old female with h/o asthma  with a chief complaint of  wheezing   Onset of symptoms was 1 day(s) ago.    Course of illness: sudden onset.  Severity moderate  She thinks her symptoms started after she got exposed to malt   Current and Associated symptoms: cough - productive and wheezing  Treatment measures tried include Inhaler (name: albuterol).  Predisposing factors include h/o asthma allergy induced asthma  She describes the cough as productive but is mostly clear phlegm.  Denies any fever chills or worsening chest pain.    Past Medical History:   Diagnosis Date    History of ventricular tachycardia     on Toprol    Hypertriglyceridemia     Impaired fasting glucose     Obesity (BMI 30-39.9)      Current Outpatient Medications   Medication Sig Dispense Refill    albuterol  (PROAIR HFA/PROVENTIL HFA/VENTOLIN HFA) 108 (90 Base) MCG/ACT inhaler Inhale 2 puffs into the lungs every 6 hours as needed. 18 g 0    chlorpheniramine (CHLOR-TRIMETON) 4 MG tablet Take 4 mg by mouth every 6 hours as needed for allergies or rhinitis      ibuprofen (ADVIL/MOTRIN) 200 MG tablet Take 400 mg by mouth every 4 hours as needed for mild pain      loratadine (CLARITIN) 10 MG tablet Take 1 tablet (10 mg) by mouth daily 90 tablet 3    metoprolol succinate ER (TOPROL XL) 25 MG 24 hr tablet Take 1 tablet (25 mg) by mouth daily 90 tablet 3    methocarbamol (ROBAXIN) 500 MG tablet Take 1 tablet (500 mg) by mouth 3 times daily 30 tablet 1    methylPREDNISolone (MEDROL DOSEPAK) 4 MG tablet therapy pack Follow Package Directions 21 tablet 0     Social History     Tobacco Use    Smoking status: Never    Smokeless tobacco: Never   Substance Use Topics    Alcohol use: Never       ROS:  Review of systems negative except as stated above.    OBJECTIVE:   /80   Pulse 81   Temp 98  F (36.7  C) (Tympanic)   SpO2 98%   GENERAL APPEARANCE: healthy, alert and no distress  EYES: EOMI,  PERRL, conjunctiva clear  HENT: ear canals and TM's normal.  Nose normal.  Pharynx no erythema noted.  NECK: supple, non-tender to palpation, no adenopathy noted  RESP: lungs good air entry some wheezing symptoms noted   CV: regular rates and rhythm, normal S1 S2, no murmur noted  PSYCH: mentation appears normal    Khushbu Espinosa MD

## 2024-11-16 NOTE — PATIENT INSTRUCTIONS
Continue on albuterol, inhalor as needed every 4 hrs  for wheezing   Complete course of prednisone starting tomorrow for next 4 days   Continue on your regular medications   Follow up if symptoms worsens      Khushbu Espinosa MD

## 2024-12-01 ENCOUNTER — HEALTH MAINTENANCE LETTER (OUTPATIENT)
Age: 54
End: 2024-12-01

## 2024-12-07 ENCOUNTER — OFFICE VISIT (OUTPATIENT)
Dept: URGENT CARE | Facility: URGENT CARE | Age: 54
End: 2024-12-07
Payer: COMMERCIAL

## 2024-12-07 VITALS
DIASTOLIC BLOOD PRESSURE: 82 MMHG | RESPIRATION RATE: 24 BRPM | TEMPERATURE: 97.5 F | HEART RATE: 93 BPM | OXYGEN SATURATION: 97 % | BODY MASS INDEX: 31.47 KG/M2 | WEIGHT: 195 LBS | SYSTOLIC BLOOD PRESSURE: 136 MMHG

## 2024-12-07 DIAGNOSIS — M54.50 ACUTE BILATERAL LOW BACK PAIN WITHOUT SCIATICA: Primary | ICD-10-CM

## 2024-12-07 PROCEDURE — 99213 OFFICE O/P EST LOW 20 MIN: CPT | Performed by: NURSE PRACTITIONER

## 2024-12-07 RX ORDER — PREDNISONE 20 MG/1
20 TABLET ORAL DAILY
Qty: 5 TABLET | Refills: 0 | Status: SHIPPED | OUTPATIENT
Start: 2024-12-07 | End: 2024-12-12

## 2024-12-07 ASSESSMENT — PAIN SCALES - GENERAL: PAINLEVEL_OUTOF10: EXTREME PAIN (8)

## 2024-12-07 NOTE — PATIENT INSTRUCTIONS
Start zanaflex as needed for back spasms.   Take prednisone daily x 5 days  Rest the affected painful area as much as possible.    Apply ice or heat for 15-20 minutes intermittently as needed and especially after any offending activity.   Daily stretching.    As pain recedes, begin normal activities slowly as tolerated.

## 2024-12-07 NOTE — PROGRESS NOTES
Assessment & Plan     Acute bilateral low back pain without sciatica  - tiZANidine (ZANAFLEX) 4 MG tablet  Dispense: 20 tablet; Refill: 0  - predniSONE (DELTASONE) 20 MG tablet  Dispense: 5 tablet; Refill: 0     Patient Instructions   Start zanaflex as needed for back spasms.   Take prednisone daily x 5 days  Rest the affected painful area as much as possible.    Apply ice or heat for 15-20 minutes intermittently as needed and especially after any offending activity.   Daily stretching.    As pain recedes, begin normal activities slowly as tolerated.      Return in about 1 week (around 12/14/2024) for with regular provider if symptoms persist.    SHINE Rojas CNP  [facility] SPIKE Michaels is a 54 year old female who presents to clinic today for the following health issues:  Chief Complaint   Patient presents with    Back Pain     Lower back pain since Wednesday.  Pain is low near tailbone radiating up spine.  Has chronic issues     HPI    Back Pain    Onset of symptoms was 4 day(s) ago.  Location: bilateral low back  Radiation: radiates into the right buttocks  Context:       The injury happened while at home      Mechanism: none recalled by the patient      Patient experienced delayed pain  Course of symptoms is worsening.    Severity moderately severe  Current and Associated symptoms: pain and stiffness  Denies: fecal incontinence, urinary incontinence, lower extremity numbness, lower extremity weakness, and paresthesia    Aggravating Factors: coughing, sneezing, lifting, bending, and changing position  Therapies to improve symptoms include: heat, ice, ibuprofen, and physical therapy  Past history: recurrent self limited episodes of low back pain in the past    Review of Systems  Constitutional, HEENT, cardiovascular, pulmonary, GI, , musculoskeletal, neuro, skin, endocrine and psych systems are negative, except as otherwise noted.      Objective    /82  (BP Location: Right arm, Patient Position: Sitting, Cuff Size: Adult Regular)   Pulse 93   Temp 97.5  F (36.4  C) (Tympanic)   Resp 24   Wt 88.5 kg (195 lb)   SpO2 97%   BMI 31.47 kg/m    Physical Exam   GENERAL: alert and no distress  RESP: lungs clear to auscultation - no rales, rhonchi or wheezes  CV: regular rate and rhythm, normal S1 S2, no S3 or S4, no murmur, click or rub, no peripheral edema  MS: decreased range of motion to lumbar back, no edema, and peripheral pulses normal  BACK: no paralumbar tenderness

## 2025-01-06 ASSESSMENT — ASTHMA QUESTIONNAIRES
QUESTION_4 LAST FOUR WEEKS HOW OFTEN HAVE YOU USED YOUR RESCUE INHALER OR NEBULIZER MEDICATION (SUCH AS ALBUTEROL): ONCE A WEEK OR LESS
QUESTION_2 LAST FOUR WEEKS HOW OFTEN HAVE YOU HAD SHORTNESS OF BREATH: ONCE OR TWICE A WEEK
QUESTION_3 LAST FOUR WEEKS HOW OFTEN DID YOUR ASTHMA SYMPTOMS (WHEEZING, COUGHING, SHORTNESS OF BREATH, CHEST TIGHTNESS OR PAIN) WAKE YOU UP AT NIGHT OR EARLIER THAN USUAL IN THE MORNING: NOT AT ALL
ACT_TOTALSCORE: 21
QUESTION_1 LAST FOUR WEEKS HOW MUCH OF THE TIME DID YOUR ASTHMA KEEP YOU FROM GETTING AS MUCH DONE AT WORK, SCHOOL OR AT HOME: A LITTLE OF THE TIME
QUESTION_5 LAST FOUR WEEKS HOW WOULD YOU RATE YOUR ASTHMA CONTROL: WELL CONTROLLED
ACT_TOTALSCORE: 21

## 2025-01-07 ENCOUNTER — TELEPHONE (OUTPATIENT)
Dept: INTERNAL MEDICINE | Facility: CLINIC | Age: 55
End: 2025-01-07

## 2025-01-07 ENCOUNTER — OFFICE VISIT (OUTPATIENT)
Dept: INTERNAL MEDICINE | Facility: CLINIC | Age: 55
End: 2025-01-07
Payer: COMMERCIAL

## 2025-01-07 VITALS
SYSTOLIC BLOOD PRESSURE: 128 MMHG | HEIGHT: 66 IN | BODY MASS INDEX: 31.95 KG/M2 | WEIGHT: 198.8 LBS | DIASTOLIC BLOOD PRESSURE: 84 MMHG | OXYGEN SATURATION: 99 % | HEART RATE: 77 BPM

## 2025-01-07 DIAGNOSIS — R73.03 PREDIABETES: ICD-10-CM

## 2025-01-07 DIAGNOSIS — R00.2 PALPITATIONS: ICD-10-CM

## 2025-01-07 DIAGNOSIS — Z23 ENCOUNTER FOR IMMUNIZATION: ICD-10-CM

## 2025-01-07 DIAGNOSIS — E78.5 DYSLIPIDEMIA: ICD-10-CM

## 2025-01-07 DIAGNOSIS — Z86.79 HISTORY OF VENTRICULAR TACHYCARDIA: Primary | ICD-10-CM

## 2025-01-07 LAB
ALT SERPL W P-5'-P-CCNC: 20 U/L (ref 0–50)
ANION GAP SERPL CALCULATED.3IONS-SCNC: 10 MMOL/L (ref 7–15)
BUN SERPL-MCNC: 14.8 MG/DL (ref 6–20)
CALCIUM SERPL-MCNC: 9.1 MG/DL (ref 8.8–10.4)
CHLORIDE SERPL-SCNC: 104 MMOL/L (ref 98–107)
CHOLEST SERPL-MCNC: 188 MG/DL
CREAT SERPL-MCNC: 0.89 MG/DL (ref 0.51–0.95)
EGFRCR SERPLBLD CKD-EPI 2021: 78 ML/MIN/1.73M2
EST. AVERAGE GLUCOSE BLD GHB EST-MCNC: 120 MG/DL
FASTING STATUS PATIENT QL REPORTED: YES
FASTING STATUS PATIENT QL REPORTED: YES
GLUCOSE SERPL-MCNC: 113 MG/DL (ref 70–99)
HBA1C MFR BLD: 5.8 % (ref 0–5.6)
HCO3 SERPL-SCNC: 24 MMOL/L (ref 22–29)
HDLC SERPL-MCNC: 39 MG/DL
LDLC SERPL CALC-MCNC: ABNORMAL MG/DL
LDLC SERPL DIRECT ASSAY-MCNC: 89 MG/DL
NONHDLC SERPL-MCNC: 149 MG/DL
POTASSIUM SERPL-SCNC: 4.3 MMOL/L (ref 3.4–5.3)
SODIUM SERPL-SCNC: 138 MMOL/L (ref 135–145)
TRIGL SERPL-MCNC: 430 MG/DL

## 2025-01-07 RX ORDER — METOPROLOL SUCCINATE 25 MG/1
25 TABLET, EXTENDED RELEASE ORAL DAILY
Qty: 90 TABLET | Refills: 3 | Status: SHIPPED | OUTPATIENT
Start: 2025-01-07

## 2025-01-07 NOTE — PROGRESS NOTES
Assessment & Plan   History of ventricular tachycardia  Palpitations  Patient reports worsening control of her symptoms in the last year. I do not know if her symptoms are related to an arrhythmia (no records available for review and patient is not sure). Discussed my recommendation we work this up further with Zio Patch testing at a minimum. Patient declined. She plans to take her metoprolol in the AM instead of the PM to see if that helps. I reviewed ER precautions and again reviewed why I think it's a good idea to pursue further testing. Patient again deferred that today but knows she can reach out in the future if she changes her mind. Refilled metoprolol at current dose per request.  - metoprolol succinate ER (TOPROL XL) 25 MG 24 hr tablet; Take 1 tablet (25 mg) by mouth daily.    Dyslipidemia  Fasting lab check today.  - ALT; Future  - Lipid panel reflex to direct LDL Fasting; Future    Prediabetes  Lab check today.  - Basic metabolic panel; Future  - Hemoglobin A1c; Future    Encounter for immunization  - INFLUENZA VACCINE TRIVALENT(FLUBLOK)  - COVID-19 12+ (PFIZER)    Signed Electronically by:  Leroy Yadav MD, MPH  [facility]  Internal Medicine    Subjective   Xenia is a 54 year old who presents fasting today for a lab check. This is the first time I have met Xenia, who typically sees Dr. Yung in our clinic. She is hoping to have labs done today. She needs a refill of her metoprolol though is wondering about adjustment. She takes the metoprolol in the evening and notes it wears off during the day the next day. Feels lightheaded, palpitations. Feels it hasn't been doing as good of a job controlling her tachycardia as it had been. Worsening just this last year. Dancing at a concert and doing ballet have both worsened symptoms. Her Apple watch noted HR during ballet got above 130. That made her symptomatic. She denies chest pain. She does get lightheaded. She is not  "sure what rhythm she was diagnosed with in 2019, just \"ventricular tachycardia\".      Objective    /84   Pulse 77   Ht 1.676 m (5' 6\")   Wt 90.2 kg (198 lb 12.8 oz)   LMP 12/17/2024   SpO2 99%   BMI 32.09 kg/m    Body mass index is 32.09 kg/m .    Physical Exam   GENERAL: alert and in no distress.  EYES: conjunctivae/corneas clear. EOMs grossly intact  HENT: Facies symmetric.  RESP: CTAB, no w/r/r.  CV: RRR, no m/r/g.  MSK: Moves all four extremities freely.  SKIN: No significant ulcers, lesions, or rashes on the visualized portions of the skin  NEURO: CN II-XII grossly intact.  "

## 2025-01-07 NOTE — TELEPHONE ENCOUNTER
Patient can be scheduled in a virtual slot. And also can be double booked into the lunch slot. Please do not schedule in a same day or next day slot.

## 2025-01-07 NOTE — PATIENT INSTRUCTIONS
- I will send you a message on [employer] when I am able to look at the results of your tests from today

## 2025-03-06 ENCOUNTER — VIRTUAL VISIT (OUTPATIENT)
Dept: URGENT CARE | Facility: CLINIC | Age: 55
End: 2025-03-06
Payer: COMMERCIAL

## 2025-03-06 DIAGNOSIS — Z29.9 PROPHYLACTIC MEASURE: ICD-10-CM

## 2025-03-06 DIAGNOSIS — J45.41 MODERATE PERSISTENT ASTHMA WITH EXACERBATION: Primary | ICD-10-CM

## 2025-03-06 DIAGNOSIS — J22 LOWER RESPIRATORY INFECTION: ICD-10-CM

## 2025-03-06 RX ORDER — FLUCONAZOLE 150 MG/1
150 TABLET ORAL ONCE
Qty: 1 TABLET | Refills: 0 | Status: SHIPPED | OUTPATIENT
Start: 2025-03-06 | End: 2025-03-06

## 2025-03-06 RX ORDER — BENZONATATE 200 MG/1
200 CAPSULE ORAL 3 TIMES DAILY PRN
Qty: 30 CAPSULE | Refills: 0 | Status: SHIPPED | OUTPATIENT
Start: 2025-03-06

## 2025-03-06 RX ORDER — PREDNISONE 20 MG/1
40 TABLET ORAL DAILY
Qty: 10 TABLET | Refills: 0 | Status: SHIPPED | OUTPATIENT
Start: 2025-03-06 | End: 2025-03-11

## 2025-03-06 RX ORDER — AZITHROMYCIN 250 MG/1
TABLET, FILM COATED ORAL
Qty: 6 TABLET | Refills: 0 | Status: SHIPPED | OUTPATIENT
Start: 2025-03-06 | End: 2025-03-11

## 2025-03-06 NOTE — LETTER
[facility]  [address] [address]  Phone: [phone]    March 6, 2025        Xenia Snow  [address] [place] [id number]          To whom it may concern:    RE: Xenia Snow    Patient was evaluated today. Before returning to work, she must be fever free for 24 hours and have an improvement in symptoms. Please excuse her from work missed during this time. If she must be absent beyond 3/10/25, she'll need to be seen for further evaluation.     Please contact me for questions or concerns.      Sincerely,      JULISA Avendano [facility]  Electronically signed

## 2025-03-06 NOTE — PROGRESS NOTES
Assessment & Plan     Moderate persistent asthma with exacerbation  - predniSONE (DELTASONE) 20 MG tablet; Take 2 tablets (40 mg) by mouth daily for 5 days.    Prophylactic measure  - fluconazole (DIFLUCAN) 150 MG tablet; Take 1 tablet (150 mg) by mouth once for 1 dose.    Lower respiratory infection  - azithromycin (ZITHROMAX) 250 MG tablet; Take 2 tablets (500 mg) by mouth daily for 1 day, THEN 1 tablet (250 mg) daily for 4 days.  - benzonatate (TESSALON) 200 MG capsule; Take 1 capsule (200 mg) by mouth 3 times daily as needed for cough.    Be seen with respiratory distress.  Return in about 4 days (around 3/10/2025) for visit with primary care provider if not improving.       Bee Amaya PA-C  [facility]    Subjective   Xenia Snow is a 54 year old who presents for the following health issues    HPI    Symptom onset: cold for about a week, worsened Saturday worsened  Current symptoms: cough, mostly dry  Sometimes a chunk of tan mucus, minimal nasal congestion  No fever, short of breath- difficult to talk or take deep breath because it triggers her cough  O2 93% last night  Medication review complete.    Review of Systems   ROS negative except as stated above.      Objective    Physical Exam   healthy, alert and no distress  PSYCH: Alert and oriented times 3; coherent speech, normal   rate and volume, able to articulate logical thoughts, able   to abstract reason, no tangential thoughts, no hallucinations   or delusions. Affect is normal and pleasant  RESP: Constant, difficult to control, deep cough, no audible wheezing, able to talk in full sentences  Remainder of exam unable to be completed due to telephone visits    Connected to video, patient unable to hear provider.   Changed from video visit to telephone visit  Call duration: 18 min  Provider location: offsite at [facility]  Patient location: at [place], [place]    No results found for any visits on 03/06/25.

## 2025-03-06 NOTE — PATIENT INSTRUCTIONS
Azithromycin 250 mg as directed- 2 pills together at the same time today then 1 pill a day for the next 4 days.  Prednisone 40mg daily for 5 days  Tessalon Perles (benzonatate) 1-2 capsules 3 times daily as needed for cough.  Diflucan at the first sign of a yeast infection    Rest! Your body needs more rest to heal.  Drink plenty of fluids (warm fluids like tea or soup are soothing and reduce cough)  Sit in the bathroom with a hot shower running and breathe in the steam.  Honey may soothe your sore throat and help manage your cough- may take straight or in warm water with lemon juice.  Avoid smoke (cigarettes, bonfires, fireplace, wood burning stoves).  Take Tylenol or an NSAID such as ibuprofen or naproxen as needed for pain.  Delsym (dextromethorphan polistirex) is an over the counter cough medication that lasts 12 hours.   Mucinex or Robitussin (guiafenesin) thin mucus and may help it to loosen more quickly  Good handwashing is the best way to prevent spread of germs  Present to emergency room if you develop trouble breathing, swallowing or cough-up blood.  Follow up with your primary care provider if symptoms worsen or fail to improve as expected.

## 2025-03-15 ENCOUNTER — OFFICE VISIT (OUTPATIENT)
Dept: URGENT CARE | Facility: URGENT CARE | Age: 55
End: 2025-03-15
Payer: COMMERCIAL

## 2025-03-15 VITALS
WEIGHT: 202.4 LBS | HEART RATE: 91 BPM | TEMPERATURE: 98 F | SYSTOLIC BLOOD PRESSURE: 132 MMHG | BODY MASS INDEX: 32.67 KG/M2 | OXYGEN SATURATION: 99 % | DIASTOLIC BLOOD PRESSURE: 82 MMHG | RESPIRATION RATE: 17 BRPM

## 2025-03-15 DIAGNOSIS — J45.901 MILD ASTHMA WITH EXACERBATION, UNSPECIFIED WHETHER PERSISTENT: Primary | ICD-10-CM

## 2025-03-15 PROCEDURE — 3075F SYST BP GE 130 - 139MM HG: CPT | Performed by: PHYSICIAN ASSISTANT

## 2025-03-15 PROCEDURE — 99214 OFFICE O/P EST MOD 30 MIN: CPT | Performed by: PHYSICIAN ASSISTANT

## 2025-03-15 PROCEDURE — 3079F DIAST BP 80-89 MM HG: CPT | Performed by: PHYSICIAN ASSISTANT

## 2025-03-15 RX ORDER — PREDNISONE 20 MG/1
TABLET ORAL
Qty: 16 TABLET | Refills: 0 | Status: SHIPPED | OUTPATIENT
Start: 2025-03-15 | End: 2025-03-27

## 2025-03-15 NOTE — PATIENT INSTRUCTIONS
Patient was educated on the natural course of condition. Take medication as directed. Side effects discussed. Continue using albuterol inhaler as needed. Conservative measures include rest, increased fluids, humidifier, and teaspoon of honey a few times daily. See your primary care provider if symptoms do not improve in 7 days. Seek emergency care if you develop shortness of breath or fever over 103.

## 2025-03-15 NOTE — PROGRESS NOTES
URGENT CARE VISIT:    SUBJECTIVE:   Xenia Snow is a 54 year old female presenting with a chief complaint of cough - non-productive and shortness of breath.  Onset was 1 day(s) ago.   She denies the following symptoms: fever and stuffy nose  Course of illness is worsening.    Treatment measures tried include Inhaler (name: albuterol) with no relief of symptoms.  Predisposing factors include HX of asthma.    PMH:   Past Medical History:   Diagnosis Date    History of ventricular tachycardia     on Toprol    Hypertriglyceridemia     Impaired fasting glucose     Obesity (BMI 30-39.9)     Uncomplicated asthma 2015    Under control.     Allergies: Amitiza [lubiprostone]   Medications:   Current Outpatient Medications   Medication Sig Dispense Refill    albuterol (PROAIR HFA/PROVENTIL HFA/VENTOLIN HFA) 108 (90 Base) MCG/ACT inhaler Inhale 2 puffs into the lungs every 6 hours as needed. 18 g 0    benzonatate (TESSALON) 200 MG capsule Take 1 capsule (200 mg) by mouth 3 times daily as needed for cough. 30 capsule 0    chlorpheniramine (CHLOR-TRIMETON) 4 MG tablet Take 4 mg by mouth every 6 hours as needed for allergies or rhinitis      ibuprofen (ADVIL/MOTRIN) 200 MG tablet Take 400 mg by mouth every 4 hours as needed for mild pain      loratadine (CLARITIN) 10 MG tablet Take 1 tablet (10 mg) by mouth daily 90 tablet 3    metoprolol succinate ER (TOPROL XL) 25 MG 24 hr tablet Take 1 tablet (25 mg) by mouth daily. 90 tablet 3    predniSONE (DELTASONE) 20 MG tablet Take 2 tablets (40 mg) by mouth daily for 5 days, THEN 1 tablet (20 mg) daily for 4 days, THEN 0.5 tablets (10 mg) daily for 3 days. 16 tablet 0     Social History:   Social History     Tobacco Use    Smoking status: Never    Smokeless tobacco: Never   Substance Use Topics    Alcohol use: Never       ROS:  General: negative  Skin: negative  Eyes: negative  Ears/Nose/Throat: nasal congestion  Respiratory: Cough  Cardiovascular: negative  Gastrointestinal:  negative  Genitourinary: negative  Musculoskeletal: negative  Neurologic: negative      OBJECTIVE:  /82   Pulse 91   Temp 98  F (36.7  C) (Tympanic)   Resp 17   Wt 91.8 kg (202 lb 6.4 oz)   SpO2 99%   BMI 32.67 kg/m    GENERAL APPEARANCE: healthy, alert and no distress  EYES: EOMI,  PERRL, conjunctiva clear  HENT: ear canals and TM's normal.  Nose and mouth without ulcers, erythema or lesions  NECK: supple, nontender, no lymphadenopathy  RESP: lungs clear to auscultation - no rales, rhonchi or wheezes  CV: regular rates and rhythm, normal S1 S2, no murmur noted  SKIN: no suspicious lesions or rashes      ASSESSMENT:    ICD-10-CM    1. Mild asthma with exacerbation, unspecified whether persistent  J45.901 predniSONE (DELTASONE) 20 MG tablet          PLAN:  Patient Instructions   Patient was educated on the natural course of condition. Take medication as directed. Side effects discussed. Continue using albuterol inhaler as needed. Conservative measures include rest, increased fluids, humidifier, and teaspoon of honey a few times daily. See your primary care provider if symptoms do not improve in 7 days. Seek emergency care if you develop shortness of breath or fever over 103.    Patient verbalized understanding and is agreeable to plan. The patient was discharged ambulatory and in stable condition.    Sabrina Verma PA-C ....................  3/15/2025   2:37 PM

## 2025-03-15 NOTE — LETTER
March 15, 2025      Xenia Snow  [address] [place] [id number]        To Whom It May Concern:    Xenia M Gwendolyn was seen in clinic today. Please excuse from work on Monday due to illness relapse.       Sincerely,        Sabrina Verma PA-C    Electronically signed

## 2025-03-17 ENCOUNTER — HOSPITAL ENCOUNTER (EMERGENCY)
Facility: CLINIC | Age: 55
Discharge: HOME | End: 2025-03-18
Attending: EMERGENCY MEDICINE | Admitting: EMERGENCY MEDICINE
Payer: COMMERCIAL

## 2025-03-17 DIAGNOSIS — J45.909 MILD REACTIVE AIRWAYS DISEASE, UNSPECIFIED WHETHER PERSISTENT: ICD-10-CM

## 2025-03-17 DIAGNOSIS — J40 BRONCHITIS: ICD-10-CM

## 2025-03-17 PROCEDURE — 94640 AIRWAY INHALATION TREATMENT: CPT

## 2025-03-17 PROCEDURE — 250N000009 HC RX 250: Performed by: EMERGENCY MEDICINE

## 2025-03-17 PROCEDURE — 99285 EMERGENCY DEPT VISIT HI MDM: CPT | Mod: 25

## 2025-03-17 RX ORDER — IPRATROPIUM BROMIDE AND ALBUTEROL SULFATE 2.5; .5 MG/3ML; MG/3ML
3 SOLUTION RESPIRATORY (INHALATION) ONCE
Status: COMPLETED | OUTPATIENT
Start: 2025-03-18 | End: 2025-03-17

## 2025-03-17 RX ORDER — IPRATROPIUM BROMIDE AND ALBUTEROL SULFATE 2.5; .5 MG/3ML; MG/3ML
3 SOLUTION RESPIRATORY (INHALATION) ONCE
Status: COMPLETED | OUTPATIENT
Start: 2025-03-17 | End: 2025-03-17

## 2025-03-17 RX ADMIN — IPRATROPIUM BROMIDE AND ALBUTEROL SULFATE 3 ML: .5; 3 SOLUTION RESPIRATORY (INHALATION) at 17:35

## 2025-03-17 RX ADMIN — IPRATROPIUM BROMIDE AND ALBUTEROL SULFATE 3 ML: .5; 3 SOLUTION RESPIRATORY (INHALATION) at 23:57

## 2025-03-17 ASSESSMENT — ACTIVITIES OF DAILY LIVING (ADL)
ADLS_ACUITY_SCORE: 42

## 2025-03-17 NOTE — ED TRIAGE NOTES
Has had asthma exacerbation for the last couple of weeks, seen at the Urgent Care on Saturday and prescribed prednisone, and has been taking these as prescribed without much improvement. Took 2 puffs of her inhaler at 4pm today, but still feeling short of breath.     Triage Assessment (Adult)       Row Name 03/17/25 1716          Triage Assessment    Airway WDL WDL        Respiratory WDL    Respiratory WDL X;all     Rhythm/Pattern, Respiratory shortness of breath        Skin Circulation/Temperature WDL    Skin Circulation/Temperature WDL WDL        Cardiac WDL    Cardiac WDL X     Cardiac Rhythm ST        Peripheral/Neurovascular WDL    Peripheral Neurovascular WDL WDL        Cognitive/Neuro/Behavioral WDL    Cognitive/Neuro/Behavioral WDL WDL

## 2025-03-17 NOTE — Clinical Note
[facility] EMERGENCY DEPT  [address] [facility] [address]  Phone: [phone]  Fax: [phone]    March 18, 2025        Xenia Snow  [address] [place] [id number]          To whom it may concern:    RE: Xenia Snow    {:[id number]}    Please contact me for questions or concerns.      Sincerely,      Taurus Neely*

## 2025-03-18 ENCOUNTER — APPOINTMENT (OUTPATIENT)
Dept: GENERAL RADIOLOGY | Facility: CLINIC | Age: 55
End: 2025-03-18
Attending: EMERGENCY MEDICINE
Payer: COMMERCIAL

## 2025-03-18 VITALS
SYSTOLIC BLOOD PRESSURE: 149 MMHG | BODY MASS INDEX: 32.28 KG/M2 | OXYGEN SATURATION: 100 % | WEIGHT: 200 LBS | HEART RATE: 72 BPM | DIASTOLIC BLOOD PRESSURE: 85 MMHG | RESPIRATION RATE: 20 BRPM | TEMPERATURE: 98.4 F

## 2025-03-18 PROCEDURE — 250N000009 HC RX 250: Performed by: EMERGENCY MEDICINE

## 2025-03-18 PROCEDURE — 71046 X-RAY EXAM CHEST 2 VIEWS: CPT

## 2025-03-18 RX ORDER — ALBUTEROL SULFATE 90 UG/1
2 INHALANT RESPIRATORY (INHALATION) EVERY 6 HOURS PRN
Qty: 18 G | Refills: 0 | Status: SHIPPED | OUTPATIENT
Start: 2025-03-18

## 2025-03-18 RX ORDER — BENZONATATE 200 MG/1
200 CAPSULE ORAL 3 TIMES DAILY PRN
Qty: 30 CAPSULE | Refills: 0 | Status: SHIPPED | OUTPATIENT
Start: 2025-03-18 | End: 2025-03-25

## 2025-03-18 RX ORDER — IPRATROPIUM BROMIDE AND ALBUTEROL SULFATE 2.5; .5 MG/3ML; MG/3ML
3 SOLUTION RESPIRATORY (INHALATION) ONCE
Status: COMPLETED | OUTPATIENT
Start: 2025-03-18 | End: 2025-03-18

## 2025-03-18 RX ADMIN — IPRATROPIUM BROMIDE AND ALBUTEROL SULFATE 3 ML: .5; 3 SOLUTION RESPIRATORY (INHALATION) at 00:05

## 2025-03-18 ASSESSMENT — ACTIVITIES OF DAILY LIVING (ADL): ADLS_ACUITY_SCORE: 42

## 2025-03-18 NOTE — DISCHARGE INSTRUCTIONS
As we discussed, it does look like you have significant reactive airway disease and I do not that antibiotics would necessarily be helpful at this time and you already have had a trial of this.  Your vital signs are reassuring here, although your cough is quite significant, please take the medication that we have given you to help with your cough, and also use over-the-counter medication like Robitussin with dextromethorphan.  Please continue on your taper, and use your inhaler as needed.  Please do your best to check in with your regular doctor in the next 3 days at least by phone if you can to make sure that things are getting better.  Come back with any other concerns.

## 2025-03-18 NOTE — ED PROVIDER NOTES
Emergency Department Note      History of Present Illness     Chief Complaint  Asthma Exacerbation    HPI  Xenia Snow is a 54 year old female who presents to the emergency room with several weeks of shortness of breath and cough with runny nose.  She does state that she has been seen 2 previous times for this, and has a diagnosis of asthma.  She is received antibiotics, is currently on a steroid taper and did take prednisone this morning and notes that she still has significant episodes of shortness of breath and a cough.  Denies any chest pain or fainting, states that she is been compliant with all her medications.      Independent Historian  Yes patients  is at bedside     Review of External Notes  Yes I have reviewed the patient's last office visit from 3 - 15 - 25 the patient was seen for mild asthma exacerbation.      Past Medical History   Medical History and Problem List  Past Medical History:   Diagnosis Date    History of ventricular tachycardia     Hypertriglyceridemia     Impaired fasting glucose     Obesity (BMI 30-39.9)     Uncomplicated asthma 2015       Medications  albuterol (PROAIR HFA/PROVENTIL HFA/VENTOLIN HFA) 108 (90 Base) MCG/ACT inhaler  benzonatate (TESSALON) 200 MG capsule  chlorpheniramine (CHLOR-TRIMETON) 4 MG tablet  ibuprofen (ADVIL/MOTRIN) 200 MG tablet  loratadine (CLARITIN) 10 MG tablet  metoprolol succinate ER (TOPROL XL) 25 MG 24 hr tablet  predniSONE (DELTASONE) 20 MG tablet        Surgical History   Past Surgical History:   Procedure Laterality Date    COLONOSCOPY N/A 8/25/2022    Procedure: COLONOSCOPY with polypectomies using exacto cold snare;  Surgeon: Tucker Sánchez MD;  Location:  GI    DILATION AND CURETTAGE      DILATION AND EVACUATION      ENTEROSCOPY SMALL BOWEL N/A 7/18/2022    Procedure: ENTEROSCOPY;  Surgeon: Silverio Ziegler MD;  Location: [facility] OR    ESOPHAGOSCOPY, GASTROSCOPY, DUODENOSCOPY (EGD), COMBINED N/A 7/18/2022    Procedure:  ESOPHAGOGASTRODUODENOSCOPY, WITH FINE NEEDLE ASPIRATION BIOPSY, WITH ENDOSCOPIC ULTRASOUND GUIDANCE;  Surgeon: Silverio Ziegler MD;  Location: [facility] OR    LAPAROSCOPIC APPENDECTOMY  2018    LAPAROSCOPIC CHOLECYSTECTOMY  2017         Physical Exam   Patient Vitals for the past 24 hrs:   BP Temp Temp src Pulse Resp SpO2 Weight   03/17/25 2358 (!) 149/85 98.4  F (36.9  C) Axillary 72 -- -- --   03/17/25 2357 -- -- -- -- -- 100 % --   03/17/25 1715 (!) 156/81 97.9  F (36.6  C) Temporal 101 20 97 % 90.7 kg (200 lb)       Physical Exam  Vitals: reviewed by me  General: Pt seen on hospital [facility], cooperative, and alert to conversation  Eyes: Tracking well, clear conjunctiva BL  ENT: MMM, midline trachea.   Lungs: Significant coughing noted, hacking.  Auscultation exam reveals significant expiratory wheezes bilaterally.  CV: Rate as above  MSK: no joint effusion.  No evidence of trauma  Skin: No rash  Neuro: Clear speech and no facial droop.  Psych: Not RIS, no e/o AH/VH      Diagnostics   Lab Results   Labs Ordered and Resulted from Time of ED Arrival to Time of ED Departure - No data to display    Imaging  XR Chest 2 Views   Final Result   IMPRESSION:        Hyperinflation versus deep inspiration changes. Clinical correlation for air trapping.  Heart size and pulmonary vascularity within normal limits. No focal lung infiltrates. Osseous structures grossly intact. Visualized upper abdomen unremarkable.                  Independent Interpretation  Yes I have independently reviewed the patient's chest x-ray, no obvious pneumothorax noted      ED Course      Medications Administered   Medications   ipratropium - albuterol 0.5 mg/2.5 mg/3 mL (DUONEB) neb solution 3 mL (has no administration in time range)   ipratropium - albuterol 0.5 mg/2.5 mg/3 mL (DUONEB) neb solution 3 mL (3 mLs Nebulization $Given 3/17/25 7589)   ipratropium - albuterol 0.5 mg/2.5 mg/3 mL (DUONEB) neb solution 3 mL (3 mLs Nebulization $Given 3/17/25  2357)                Optional/Additional Documentation  None      Medical Decision Making / Diagnosis           MDM  This is a very pleasant 54-year-old female who presents the emergency room with what appears to be going on about 2 to 2-1/2 weeks of significant cough.  I do suspect that this is likely bronchitis as x-ray is unremarkable, she is afebrile, and has already received a trial of antibiotics without help.  Importantly there is clearly evidence of reactive airway disease as well, and she actually feels significantly improved here after several DuoNebs and her repeat exam with auscultation is actually significantly improved as well.  She tells me that she no longer feels short of breath and while she still has a hacking cough, she feels comfortable going home and we will give her Tessalon Perles as well to go home with.  She is very early on her steroid burst taper and is actually on day 3 today, and I do think that she should continue on this.  I do not think antibiotics would be particularly helpful at this time, patient is saturating well with no respiratory distress at time of discharge and I do think that she is stable for outpatient management.  Highly reliable appearing patient, red flags for when to come back to the ER were discussed in detail.  Will plan for discharge as above.    ICD-10 Codes:    ICD-10-CM    1. Mild reactive airways disease, unspecified whether persistent  J45.909       2. Bronchitis  J40              Discharge Medications  Discharge Medication List as of 3/18/2025  1:26 AM        START taking these medications    Details   !! albuterol (PROAIR HFA/PROVENTIL HFA/VENTOLIN HFA) 108 (90 Base) MCG/ACT inhaler Inhale 2 puffs into the lungs every 6 hours as needed for shortness of breath, wheezing or cough., Disp-18 g, R-0, E-PrescribePharmacy may dispense brand covered by insurance (Proair, or proventil or ventolin or generic albuterol inhaler)      !! benzonatate (TESSALON) 200 MG  capsule Take 1 capsule (200 mg) by mouth 3 times daily as needed for cough., Disp-30 capsule, R-0, E-Prescribe       !! - Potential duplicate medications found. Please discuss with provider.                     Taurus Neely MD  03/18/25 015

## 2025-03-20 ENCOUNTER — OFFICE VISIT (OUTPATIENT)
Dept: INTERNAL MEDICINE | Facility: CLINIC | Age: 55
End: 2025-03-20
Payer: COMMERCIAL

## 2025-03-20 VITALS
HEIGHT: 66 IN | WEIGHT: 205.4 LBS | SYSTOLIC BLOOD PRESSURE: 148 MMHG | DIASTOLIC BLOOD PRESSURE: 90 MMHG | BODY MASS INDEX: 33.01 KG/M2 | RESPIRATION RATE: 20 BRPM | OXYGEN SATURATION: 98 % | HEART RATE: 85 BPM | TEMPERATURE: 98 F

## 2025-03-20 DIAGNOSIS — J45.41 MODERATE PERSISTENT ASTHMA WITH EXACERBATION: Primary | ICD-10-CM

## 2025-03-20 RX ORDER — BUDESONIDE AND FORMOTEROL FUMARATE DIHYDRATE 160; 4.5 UG/1; UG/1
AEROSOL RESPIRATORY (INHALATION)
Qty: 20.4 G | Refills: 2 | Status: SHIPPED | OUTPATIENT
Start: 2025-03-20

## 2025-03-20 NOTE — PROGRESS NOTES
"  {PROVIDER CHARTING PREFERENCE:822707}    Awa Sharma is a 54 year old, presenting for the following health issues:  ER F/U    Seen on 3/15 and started prednisone taper  Seen 3/17 in ED given duonebs.    Now week six of illness but overall better. Has ongoing runny nose, cough is mostly dry. She has albuterol at home. She generally uses this when seh feels airway tightness. Does not generally use this for cough alone.     History of Present Illness     Asthma:  She presents for follow up of asthma.  She has some cough, some wheezing, and some shortness of breath.  She is using a relief medication daily. She does not have a controller medication. Patient is aware of the following triggers: unaware of any triggers. The patient has had a visit to the Emergency Room, Urgent Care or Hospital due to asthma since the last clinic visit. She has been to the Emergency Room or Urgent Care 2 times.She has had a Hospitalization 0 times.    Reason for visit:  Asthma flare/bronchitis er visit follow-up    She eats 2-3 servings of fruits and vegetables daily.She consumes 0 sweetened beverage(s) daily.She exercises with enough effort to increase her heart rate 9 or less minutes per day.  She exercises with enough effort to increase her heart rate 3 or less days per week.   She is taking medications regularly.        {MA/LPN/RN Pre-Provider Visit Orders- hCG/UA/Strep (Optional):853710}  ED/UC Followup:    Facility:  [facility] ED  Date of visit: 3/17/25-3/18/25  Reason for visit: Asthma Exacerbation  Current Status: Pt states they are feeling a little better, but is still having a lot of coughing. Pt state cough is usually a dry cough but once they start coughing it is hard to stop coughing.   {additonal problems for provider to add (Optional):350006}    {ROS Picklists (Optional):140576}      Objective    BP (!) 148/90   Pulse 85   Temp 98  F (36.7  C) (Temporal)   Resp 20   Ht 1.676 m (5' 6\")   Wt 93.2 kg (205 lb 6.4 " oz)   LMP 03/19/2025 (Exact Date)   SpO2 98%   BMI 33.15 kg/m    Body mass index is 33.15 kg/m .  Physical Exam   {Exam List (Optional):967656}    {Diagnostic Test Results (Optional):258185}        Signed Electronically by: Carolyn Meade MD  {Email feedback regarding this note to [email or web address]   :[id number]}

## 2025-03-20 NOTE — LETTER
3/20/2025    Xenia Snow   1970        To Whom it May Concern;    Please excuse Xenia Snow from work/school for illness on Mar 20, 2025.    Sincerely,        Carolyn Meade MD

## 2025-03-20 NOTE — PATIENT INSTRUCTIONS
Asthma in Adults: Care Instructions  Overview    Asthma makes it hard for you to breathe. During an asthma attack, the airways swell and narrow. Severe asthma attacks can be dangerous, but you can usually prevent them. Controlling asthma and treating symptoms before they get bad can help you avoid bad attacks. You may also avoid future trips to the doctor.  Follow-up care is a key part of your treatment and safety. Be sure to make and go to all appointments, and call your doctor if you are having problems. It's also a good idea to know your test results and keep a list of the medicines you take.  How can you care for yourself at home?  Follow your asthma action plan so you can manage your symptoms at home. An asthma action plan will help you prevent and control airway reactions and will tell you what to do during an asthma attack. If you do not have an asthma action plan, work with your doctor to build one.  Take your asthma medicine exactly as prescribed. Medicine plays an important role in controlling asthma. Talk to your doctor right away if you have any questions about what to take and how to take it.  Take your controller medicine. If you have symptoms often, you will likely need to take it every day. Controller medicine usually includes an inhaled corticosteroid. The goal is to prevent problems before they occur.  Use your quick-relief medicine when you have symptoms of an asthma attack. Some people need to use quick-relief medicine before they exercise to prevent asthma symptoms. Albuterol is a quick-relief medicine that is often used. In some cases, a certain type of controller inhaler is used as a quick-relief medicine. Ask your doctor what to use for quick relief.  If your doctor prescribed corticosteroid pills to use during an attack, take them as directed. They may take hours to work, but they may shorten the attack and help you breathe better.  Keep your quick-relief medicine with you at all  times.  Talk to your doctor before using other medicines. Some medicines, such as aspirin, can cause asthma attacks in some people.  Check yourself for asthma symptoms to know which step to follow in your action plan. Watch for things like being short of breath, having chest tightness, coughing, and wheezing. Also notice if symptoms wake you up at night or if you get tired quickly when you exercise.  If you have a peak flow meter, use it to check how well you are breathing. This can help you predict when an asthma attack is going to occur. Then you can take medicine to prevent the asthma attack or make it less severe.  See your doctor regularly. These visits will help you learn more about asthma and what you can do to control it. Your doctor will monitor your treatment to make sure the medicine is helping you.  Keep track of your asthma attacks and your treatment. After you have had an attack, write down what triggered it, what helped end it, and any concerns you have about your asthma action plan. Take your diary when you see your doctor. You can then review your asthma action plan and decide if it is working.  Don't smoke, vape, or use other tobacco or nicotine products. If you need help quitting, talk to your doctor about quit programs and medicines. Try to avoid being around smoke or the aerosol mist from vaping.  Learn what triggers an asthma attack for you, and avoid the triggers when you can. Common triggers include colds, smoke, air pollution, dust, pollen, mold, pets with fur, cockroaches, stress, and cold, dry air.  Avoid infections such as COVID-19, colds, and the flu. Wash your hands often. Talk to your doctor about getting pneumococcal and respiratory syncytial virus (RSV) vaccines. Get a flu vaccine every fall. Stay up to date on your COVID-19 vaccines.  Call 911 anytime you think you may need emergency care, and keep using your asthma action plan until help arrives. For example, call if:  You have  "severe trouble breathing.  Call your doctor now or seek immediate medical care if:  Your symptoms do not get better after you have followed your asthma action plan.  You cough up yellow, dark brown, or bloody mucus (sputum).  Watch closely for changes in your health, and be sure to contact your doctor if:  Your coughing and wheezing get worse.  You need to use quick-relief medicine on more than 2 days a week (unless it is just for exercise).  You need help figuring out what is triggering your asthma attacks.  Where can you learn more?  Go to https://www.[employer].net/patiented  Enter P597 in the search box to learn more about \"Asthma in Adults: Care Instructions.\"  Current as of: July 31, 2024  Content Version: 14.4    [id number] [employer].   Care instructions adapted under license by your healthcare professional. If you have questions about a medical condition or this instruction, always ask your healthcare professional. [employer] disclaims any warranty or liability for your use of this information.    "

## 2025-04-21 SDOH — HEALTH STABILITY: PHYSICAL HEALTH: ON AVERAGE, HOW MANY MINUTES DO YOU ENGAGE IN EXERCISE AT THIS LEVEL?: 30 MIN

## 2025-04-21 SDOH — HEALTH STABILITY: PHYSICAL HEALTH: ON AVERAGE, HOW MANY DAYS PER WEEK DO YOU ENGAGE IN MODERATE TO STRENUOUS EXERCISE (LIKE A BRISK WALK)?: 3 DAYS

## 2025-04-21 ASSESSMENT — SOCIAL DETERMINANTS OF HEALTH (SDOH): HOW OFTEN DO YOU GET TOGETHER WITH FRIENDS OR RELATIVES?: ONCE A WEEK

## 2025-04-24 ENCOUNTER — OFFICE VISIT (OUTPATIENT)
Dept: INTERNAL MEDICINE | Facility: CLINIC | Age: 55
End: 2025-04-24
Attending: INTERNAL MEDICINE
Payer: COMMERCIAL

## 2025-04-24 VITALS
WEIGHT: 207.4 LBS | HEART RATE: 88 BPM | TEMPERATURE: 97 F | BODY MASS INDEX: 33.48 KG/M2 | OXYGEN SATURATION: 99 % | SYSTOLIC BLOOD PRESSURE: 122 MMHG | DIASTOLIC BLOOD PRESSURE: 72 MMHG

## 2025-04-24 DIAGNOSIS — Z12.4 SCREENING FOR CERVICAL CANCER: ICD-10-CM

## 2025-04-24 DIAGNOSIS — Z00.00 ROUTINE GENERAL MEDICAL EXAMINATION AT A HEALTH CARE FACILITY: Primary | ICD-10-CM

## 2025-04-24 DIAGNOSIS — L98.9 SKIN LESION: ICD-10-CM

## 2025-04-24 DIAGNOSIS — J45.40 MODERATE PERSISTENT ASTHMA WITHOUT COMPLICATION: ICD-10-CM

## 2025-04-24 DIAGNOSIS — Z86.79 HISTORY OF VENTRICULAR TACHYCARDIA: ICD-10-CM

## 2025-04-24 PROBLEM — R73.03 PREDIABETES: Status: ACTIVE | Noted: 2025-04-24

## 2025-04-24 RX ORDER — BUDESONIDE AND FORMOTEROL FUMARATE DIHYDRATE 160; 4.5 UG/1; UG/1
AEROSOL RESPIRATORY (INHALATION)
Qty: 20.4 G | Refills: 4 | Status: SHIPPED | OUTPATIENT
Start: 2025-04-24

## 2025-04-24 NOTE — PROGRESS NOTES
"Preventive Care Visit  [facility]  Leroy Yadav MD, Internal Medicine  Apr 24, 2025    Assessment & Plan   Routine general medical examination at a health care facility  Reviewed PMH. Discussed healthcare maintenance issues, including cancer screenings, relevant immunizations, and cardiac risk factor screenings such as for cholesterol, HTN, and DM.    Moderate persistent asthma without complication  Refilled Symbicort as it is helping her symptoms. Patient wondering if she has new allergies and why her asthma is worsening. Offered referral to our asthma/allergy clinic, she accepted.  - budesonide-formoterol (SYMBICORT/BREYNA) 160-4.5 MCG/ACT Inhaler; Inhale 1 puff TWICE daily every day and add 2 puffs every 4 to 6 hours as needed for shortness of breath/symptoms (max 12 puffs per day)  - Adult Allergy/Asthma  Referral; Future    History of ventricular tachycardia  Recommended she re-establish with cardiology. She was in agreement today. Referral placed.  - Adult Cardiology Eval  Referral; Future    Skin lesion  Dermatology referral placed.  - Adult Dermatology  Referral; Future    Screening for cervical cancer  Pap obtained with broom. Plan pending results.  - HPV and Gynecologic Cytology Panel - Recommended Age 30-65 Years    BMI  Estimated body mass index is 33.48 kg/m  as calculated from the following:    Height as of 3/20/25: 1.676 m (5' 6\").    Weight as of this encounter: 94.1 kg (207 lb 6.4 oz).     Counseling  Appropriate preventive services were addressed with this patient via screening, questionnaire, or discussion as appropriate for fall prevention, nutrition, physical activity, Tobacco-use cessation, social engagement, weight loss and cognition. Checklist reviewing preventive services available has been given to the patient. Reviewed patient's diet, addressing concerns and/or questions. She is at risk for lack of exercise and has been provided with " information to increase physical activity for the benefit of her well-being.     Signed Electronically by:  Leroy Yadav MD, MPH  [facility]  Internal Medicine    The longitudinal plan of care for the diagnosis(es)/condition(s) as documented were addressed during this visit. Due to the added complexity in care, I will continue to support Edith in the subsequent management and with ongoing continuity of care.    Subjective   Edith is a 55 year old presenting for the following: Physical    HPI  Edith presents today for a physical exam. We also discussed her asthma. The Symbicort is improving her symptoms. She has a spot on her skin she'd like me to look at.    Advance Care Planning - Not on file        4/21/2025   General Health   How would you rate your overall physical health? Good   Feel stress (tense, anxious, or unable to sleep) Only a little   (!) STRESS CONCERN      4/21/2025   Nutrition   Three or more servings of calcium each day? Yes   Diet: Gluten-free/reduced    Other   If other, please elaborate: Yeast free, corn free   How many servings of fruit and vegetables per day? 4 or more   How many sweetened beverages each day? 0-1       Multiple values from one day are sorted in reverse-chronological order         4/21/2025   Exercise   Days per week of moderate/strenous exercise 3 days   Average minutes spent exercising at this level 30 min         4/21/2025   Social Factors   Frequency of gathering with friends or relatives Once a week   Worry food won't last until get money to buy more No   Food not last or not have enough money for food? No   Do you have housing? (Housing is defined as stable permanent housing and does not include staying outside in a car, in a tent, in an abandoned building, in an overnight shelter, or couch-surfing.) Yes   Are you worried about losing your housing? No   Lack of transportation? No   Unable to get utilities (heat,electricity)? No          4/21/2025   Fall Risk   Fallen 2 or more times in the past year? No   Trouble with walking or balance? No          4/21/2025   Dental   Dentist two times every year? Yes     Today's PHQ-2 Score:       1/6/2025    10:34 AM   PHQ-2 ( 1999 [employer])   Q1: Little interest or pleasure in doing things 0   Q2: Feeling down, depressed or hopeless 0   PHQ-2 Score 0    Q1: Little interest or pleasure in doing things Not at all   Q2: Feeling down, depressed or hopeless Not at all   PHQ-2 Score 0       Patient-reported         4/21/2025   Substance Use   Alcohol more than 3/day or more than 7/wk No   Do you use any other substances recreationally? No     Social History     Tobacco Use    Smoking status: Never    Smokeless tobacco: Never   Vaping Use    Vaping status: Never Used   Substance Use Topics    Alcohol use: Never    Drug use: Never         5/15/2024   LAST FHS-7 RESULTS   1st degree relative breast or ovarian cancer Yes   Any relative bilateral breast cancer No   Any male have breast cancer No   Any ONE woman have BOTH breast AND ovarian cancer No   Any woman with breast cancer before 50yrs No   2 or more relatives with breast AND/OR ovarian cancer Yes   2 or more relatives with breast AND/OR bowel cancer No         4/21/2025   STI Screening   New sexual partner(s) since last STI/HIV test? No     History of abnormal Pap smear: No - age 30-64 HPV with reflex Pap every 5 years recommended        Latest Ref Rng & Units 1/3/2020     8:30 AM 1/3/2020     8:11 AM   PAP / HPV   PAP (Historical)   NIL    HPV 16 DNA NEG^Negative Negative     HPV 18 DNA NEG^Negative Negative     Other HR HPV NEG^Negative Negative       ASCVD Risk   The 10-year ASCVD risk score (Sara SHELTON, et al., 2019) is: 2%    Values used to calculate the score:      Age: 55 years      Sex: Female      Is Non- : No      Diabetic: No      Tobacco smoker: No      Systolic Blood Pressure: 122 mmHg      Is BP treated: No      HDL  "Cholesterol: 39 mg/dL      Total Cholesterol: 188 mg/dL    Reviewed and updated as needed this visit by Provider   Tobacco  Allergies  Meds  Problems  Med Hx  Surg Hx  Fam Hx             Objective    Exam  /72   Pulse 88   Temp 97  F (36.1  C) (Tympanic)   Wt 94.1 kg (207 lb 6.4 oz)   LMP 04/13/2025 (Exact Date)   SpO2 99%   BMI 33.48 kg/m     Estimated body mass index is 33.48 kg/m  as calculated from the following:    Height as of 3/20/25: 1.676 m (5' 6\").    Weight as of this encounter: 94.1 kg (207 lb 6.4 oz).    Physical Exam  GENERAL: In no distress.  EYES: Conjunctivae/corneas clear. EOMs grossly intact.  HENT: NC/AT, facies symmetric. Neck supple. No LAD or thyromegaly noted.  RESP: CTAB. No w/r/r.  CV: RRR, no m/r/g.  GI: NT, ND, without rebound or guarding, no CVA tenderness, no hepatomegaly appreciated.  : Vulva/vaginal tissue appears normal without lesion. Cervix fully visualized, no noted friability or lesions.  MSK: Moves all four extremities freely.  SKIN: No significant ulcers, lesions or rashes on the visualized portions of the skin  NEURO: Alert. Oriented.  PSYCH: Linear thought process. Speech normal rate and volume. No tangential thoughts, hallucinations, or delusions.  "

## 2025-04-24 NOTE — PATIENT INSTRUCTIONS
- Stop by our pharmacy downstairs or your preferred pharmacy to discuss obtaining a Prevnar (pneumonia) shot    - See dermatology if this skin patch doesn't resolve or worsens (call [phone] to schedule)    - Referral placed for the allergy/asthma clinic (call [phone] to schedule)    - Referral placed for you to see a cardiologist (call [phone] to schedule)

## 2025-04-29 LAB
BKR AP ASSOCIATED HPV REPORT: NORMAL
BKR LAB AP GYN ADEQUACY: NORMAL
BKR LAB AP GYN INTERPRETATION: NORMAL
BKR LAB AP LMP: NORMAL
BKR LAB AP PREVIOUS ABNORMAL: NORMAL
PATH REPORT.COMMENTS IMP SPEC: NORMAL
PATH REPORT.COMMENTS IMP SPEC: NORMAL
PATH REPORT.RELEVANT HX SPEC: NORMAL

## 2025-04-30 ENCOUNTER — PATIENT OUTREACH (OUTPATIENT)
Dept: CARE COORDINATION | Facility: CLINIC | Age: 55
End: 2025-04-30
Payer: COMMERCIAL

## 2025-05-20 ENCOUNTER — TELEPHONE (OUTPATIENT)
Dept: CARDIOLOGY | Facility: CLINIC | Age: 55
End: 2025-05-20
Payer: COMMERCIAL

## 2025-05-20 DIAGNOSIS — R00.2 PALPITATIONS: Primary | ICD-10-CM

## 2025-05-20 NOTE — TELEPHONE ENCOUNTER
JAMEEL for pt to call back to see where she has been seen for VT, as there is nothing in Epic or care everywhere. Shellie

## 2025-05-27 ENCOUNTER — ORDERS ONLY (AUTO-RELEASED) (OUTPATIENT)
Dept: CARDIOLOGY | Facility: CLINIC | Age: 55
End: 2025-05-27
Payer: COMMERCIAL

## 2025-05-27 DIAGNOSIS — R00.2 PALPITATIONS: ICD-10-CM

## 2025-05-27 NOTE — TELEPHONE ENCOUNTER
Pt called back stating she moved to [place] in 2019 and first had episode that was actually caught on EKG when she presented to ER in Jan 2019, did not mention what ER this was at. States was started on metoprolol at that time and had been fine ever since up until the last 6 months when started having symptoms again. Called pt back to ask about what ER and what symptoms.     Called pt back who states she knows she has records with [facility] and knows she wore a previous ZP through them and can't recall if ER visit in jan 2019 was also there or not. Will try to reach out for records. She states if needed, her  was in [employer] and if needed to obtain records his name is Willie Snow and his ID #.    Pt also stated sometime at end of last year she had another episode while at a concert of elevated HR and pre syncopal symptoms. Since has been having intermittent symptoms. Informed her  may want another monitor prior to visit and we would let her know. Pt verbalized understanding and denied further questions at this time.   OPAL Payne

## 2025-05-27 NOTE — TELEPHONE ENCOUNTER
05/27/25 Another attempt made to reach pt to find out where she previously was treated for VT. No answer, reached ALISA, JAMEEL and requested callback  Javier 845 am

## 2025-05-27 NOTE — TELEPHONE ENCOUNTER
05/27/25 Records available in Care Everywhere. Verbal order recd from Dr Nunez for pt to wear updated monitor x 2 weeks. Pt prefers mail out. Address verified. Appt changed from 6/12 to 7/14 at 215. Message sent to scheduling  Javier 235 pm

## 2025-06-19 LAB — CV ZIO PRELIM RESULTS: NORMAL

## 2025-06-22 LAB — CV ZIO PRELIM RESULTS: NORMAL

## 2025-06-23 ENCOUNTER — OFFICE VISIT (OUTPATIENT)
Dept: URGENT CARE | Facility: URGENT CARE | Age: 55
End: 2025-06-23
Payer: COMMERCIAL

## 2025-06-23 VITALS
OXYGEN SATURATION: 99 % | RESPIRATION RATE: 16 BRPM | SYSTOLIC BLOOD PRESSURE: 126 MMHG | HEIGHT: 66 IN | TEMPERATURE: 97.5 F | BODY MASS INDEX: 31.5 KG/M2 | WEIGHT: 196 LBS | DIASTOLIC BLOOD PRESSURE: 68 MMHG | HEART RATE: 74 BPM

## 2025-06-23 DIAGNOSIS — M54.50 ACUTE EXACERBATION OF CHRONIC LOW BACK PAIN: ICD-10-CM

## 2025-06-23 DIAGNOSIS — M62.830 BACK MUSCLE SPASM: ICD-10-CM

## 2025-06-23 DIAGNOSIS — J45.909 MILD ASTHMA WITHOUT COMPLICATION, UNSPECIFIED WHETHER PERSISTENT: Primary | ICD-10-CM

## 2025-06-23 DIAGNOSIS — G89.29 ACUTE EXACERBATION OF CHRONIC LOW BACK PAIN: ICD-10-CM

## 2025-06-23 PROCEDURE — 99214 OFFICE O/P EST MOD 30 MIN: CPT | Performed by: PHYSICIAN ASSISTANT

## 2025-06-23 PROCEDURE — 3078F DIAST BP <80 MM HG: CPT | Performed by: PHYSICIAN ASSISTANT

## 2025-06-23 PROCEDURE — 3074F SYST BP LT 130 MM HG: CPT | Performed by: PHYSICIAN ASSISTANT

## 2025-06-23 RX ORDER — METHYLPREDNISOLONE 4 MG/1
TABLET ORAL
Qty: 21 TABLET | Refills: 0 | Status: SHIPPED | OUTPATIENT
Start: 2025-06-23

## 2025-06-23 RX ORDER — HYDROCODONE BITARTRATE AND ACETAMINOPHEN 5; 325 MG/1; MG/1
1 TABLET ORAL
Qty: 6 TABLET | Refills: 0 | Status: SHIPPED | OUTPATIENT
Start: 2025-06-23

## 2025-06-23 NOTE — PROGRESS NOTES
Assessment & Plan     Mild asthma without complication, unspecified whether persistent      Asthma makes it hard for you to breathe. During an asthma attack, the airways swell and narrow. Severe asthma attacks can be dangerous, but you can usually prevent them. Controlling asthma and treating symptoms before they get bad can help you avoid bad attacks.  Take medications as directed.  Follow up with your family doctor if not improving or go to the ED if symptoms worsen.     - methylPREDNISolone (MEDROL DOSEPAK) 4 MG tablet therapy pack  Dispense: 21 tablet; Refill: 0    Acute exacerbation of chronic low back pain    Back pain is extremely common.  Its important to avoid using heating pads, but alternating ice and warm moist compresses are helpful.  Its usually best to try to return to your daily routine as soon as possible.  Stretching and walking to help relieve your discomfort is helpful.  Many times back pain and muscle strains will worsen for the first 3-4 days and then settle down the following 3 to 4 days.  Take medication as prescribed and pay attention as some medications can cause drowsiness.  Over time you will want to slowly increase the amount of time you walk or stretch.   Expect discomfort when you first start, but this should improved as your back starts to recover and become stronger.  Use pain as your guide, if it hurts you are not ready for that activity.  Work back into activity gradually and return to activity as tolerated.      - methylPREDNISolone (MEDROL DOSEPAK) 4 MG tablet therapy pack  Dispense: 21 tablet; Refill: 0  - HYDROcodone-acetaminophen (NORCO) 5-325 MG tablet  Dispense: 6 tablet; Refill: 0    Back muscle spasm    A back spasm is sudden tightness and pain in your back muscles. It may happen from overuse or an injury. Things like sleeping in an awkward way, bending, lifting, standing, or sitting can sometimes cause a spasm. But the cause isn't always clear.  Home treatment includes  "using heat or ice, taking over-the-counter (OTC) pain medicines, and avoiding activities that may cause back pain.  For a back spasm that doesn't get better with home care, your doctor may prescribe medicine. Treatments such as massage or manipulation may also help ease a back spasm. Your doctor may also suggest exercise or physical therapy to help improve strength and flexibility in your back muscles.  In most cases, getting back to your normal activities is good for your back. Just make sure to avoid doing things that make your pain worse.    - tiZANidine (ZANAFLEX) 4 MG tablet  Dispense: 21 tablet; Refill: 0       At today's visit with Xenia Snow , we discussed results, diagnosis, medications and formulated a plan.  We also discussed red flags for immediate return to clinic/ER, as well as indications for follow up with PCP if not improved in 3 days. Patient understood and agreed to plan. Xenia LUNA Gwendolyn was discharged with stable vitals and has no further questions.       No follow-ups on file.    Dieudonne Sultana, [facility], PA-C  [facility] Sharma is a 55 year old female who presents to clinic today for the following health issues:  Chief Complaint   Patient presents with    Urgent Care     Back pain x today stepped out of the shower. Has been coughing. Pain scale 10.        HPI  Review of Systems  Constitutional, HEENT, cardiovascular, pulmonary, gi and gu systems are negative, except as otherwise noted.      Objective    /68 (BP Location: Right arm, Patient Position: Sitting, Cuff Size: Adult Regular)   Pulse 74   Temp 97.5  F (36.4  C) (Tympanic)   Resp 16   Ht 1.676 m (5' 6\")   Wt 88.9 kg (196 lb)   LMP 04/13/2025 (Exact Date)   SpO2 99%   Breastfeeding No   BMI 31.64 kg/m    Physical Exam   GENERAL: alert and no distress  RESP: lungs clear to auscultation - no rales, rhonchi or wheezes  CV: regular rate and rhythm, normal S1 S2, no S3 or S4, " no murmur, click or rub, no peripheral edema  ABDOMEN: soft, nontender, no hepatosplenomegaly, no masses and bowel sounds normal  MS: pos for lower back pain, tenderness, spasms of lower back  SKIN: no suspicious lesions or rashes  NEURO: Normal strength and tone, mentation intact and speech normal  PSYCH: mentation appears normal, affect normal/bright

## 2025-06-24 ENCOUNTER — TELEPHONE (OUTPATIENT)
Dept: INTERNAL MEDICINE | Facility: CLINIC | Age: 55
End: 2025-06-24
Payer: COMMERCIAL

## 2025-06-24 NOTE — TELEPHONE ENCOUNTER
"Per pharamcy    \" Drug ( budesonide-formoterol (SYMBICORT/BREYNA) 160-4.5 MCG/ACT Inhaler) not covered by plan.Please call/fax the pharmacy to change med.    [facility] [id number]  "

## 2025-06-24 NOTE — TELEPHONE ENCOUNTER
Team - please follow-up with pharmacy to see what is covered here. Please also involve patient in discussion.

## 2025-06-25 NOTE — TELEPHONE ENCOUNTER
"Called and spoke to the pharmacy. Pharmacy stated to \"ignore the message below.\" They were able to find an alternative inhaler that is covered by insurance and nothing additional is needed at this time.    Closing encounter.    Treva Ballard RN  "

## 2025-07-01 ENCOUNTER — OFFICE VISIT (OUTPATIENT)
Dept: URGENT CARE | Facility: URGENT CARE | Age: 55
End: 2025-07-01
Payer: COMMERCIAL

## 2025-07-01 ENCOUNTER — TELEPHONE (OUTPATIENT)
Dept: INTERNAL MEDICINE | Facility: CLINIC | Age: 55
End: 2025-07-01

## 2025-07-01 VITALS
OXYGEN SATURATION: 99 % | TEMPERATURE: 97.7 F | WEIGHT: 196 LBS | SYSTOLIC BLOOD PRESSURE: 122 MMHG | HEART RATE: 83 BPM | RESPIRATION RATE: 22 BRPM | BODY MASS INDEX: 31.64 KG/M2 | DIASTOLIC BLOOD PRESSURE: 72 MMHG

## 2025-07-01 DIAGNOSIS — M62.830 BACK MUSCLE SPASM: Primary | ICD-10-CM

## 2025-07-01 PROCEDURE — 3074F SYST BP LT 130 MM HG: CPT | Performed by: FAMILY MEDICINE

## 2025-07-01 PROCEDURE — 3078F DIAST BP <80 MM HG: CPT | Performed by: FAMILY MEDICINE

## 2025-07-01 PROCEDURE — 99214 OFFICE O/P EST MOD 30 MIN: CPT | Performed by: FAMILY MEDICINE

## 2025-07-01 RX ORDER — METHYLPREDNISOLONE 4 MG/1
TABLET ORAL
Qty: 21 TABLET | Refills: 0 | Status: SHIPPED | OUTPATIENT
Start: 2025-07-01 | End: 2025-07-08

## 2025-07-01 RX ORDER — HYDROCODONE BITARTRATE AND ACETAMINOPHEN 5; 325 MG/1; MG/1
1 TABLET ORAL EVERY 6 HOURS PRN
Qty: 8 TABLET | Refills: 0 | Status: SHIPPED | OUTPATIENT
Start: 2025-07-01 | End: 2025-07-03

## 2025-07-01 NOTE — PROGRESS NOTES
SUBJECTIVEHPI: Xenia Snow is a 55 year old female who presents for evaluation of back pain.  Symptoms began 2-3 day(s) ago, have been onset gradual and are worse.  Pain is located in the middle of back right region, with radiation to does not radiate.  Recent injury:none recalled by the patient  Personal hx of back pain is recurrent self limited episodes of low back pain in the past.  Pain is exacerbated by: bending and changing position.  Pain is relieved by: none.  Associated sx include: none.  Red flag symptoms: negative.    Was seen for lower back pain last week, got better then worse    Past Medical History:   Diagnosis Date    History of ventricular tachycardia     on Toprol    Hypertriglyceridemia     Impaired fasting glucose     Obesity (BMI 30-39.9)     Uncomplicated asthma 2015    Under control.     Allergies   Allergen Reactions    Amitiza [Lubiprostone] GI Disturbance     Social History     Tobacco Use    Smoking status: Never     Passive exposure: Never    Smokeless tobacco: Never   Substance Use Topics    Alcohol use: Never       ROS:CONSTITUTIONAL:NEGATIVE for fever, chills, change in weight  RESP:NEGATIVE for significant cough or SOB  GI: NEGATIVE for nausea, abdominal pain, heartburn, or change in bowel habits  : negative for, dysuria, and hematuria    OBJECTIVE:  /72   Pulse 83   Temp 97.7  F (36.5  C) (Tympanic)   Resp 22   Wt 88.9 kg (196 lb)   LMP 06/05/2025   SpO2 99%   BMI 31.64 kg/m    Back examination: Back symmetric, no curvature. ROM normal. No CVA tenderness., positive findings: limitation of motion - flexion: Moderate and extension: Moderate, paraspinal muscle spasm, tenderness to palpation.  GENERAL APPEARANCE: healthy, alert and no distress  NEURO: Normal strength and tone with no weakness or sensory deficit noted, reflexes normal   SKIN: no suspicious lesions or rashes      ICD-10-CM    1. Back muscle spasm  M62.830 methylPREDNISolone (MEDROL DOSEPAK) 4 MG  tablet therapy pack     tiZANidine (ZANAFLEX) 4 MG tablet     HYDROcodone-acetaminophen (NORCO) 5-325 MG tablet     Physical Therapy  Referral        Continue prn heat or ice application.    F/U PCP/IM/FP if persists, ED if worse

## 2025-07-01 NOTE — PROGRESS NOTES
Urgent Care Clinic Visit    Chief Complaint   Patient presents with    Urgent Care    Back Pain     Pt was put on steroids a week ago. Pt states she feels like after completing the steroids but since she feels like she is getting worst.               7/1/2025     1:20 PM   Additional Questions   Roomed by Rochelle   Accompanied by self

## 2025-07-01 NOTE — TELEPHONE ENCOUNTER
Patient seen in urgent care 6-23-25 for back pain. Back felt better then is now having pain. She would like to follow urgent care provider's recommendation to follow up with PCP if needed. No appointments available today. Patient will go to urgent care clinic now.     Edith Antony RN

## 2025-07-08 ENCOUNTER — THERAPY VISIT (OUTPATIENT)
Dept: PHYSICAL THERAPY | Facility: CLINIC | Age: 55
End: 2025-07-08
Attending: FAMILY MEDICINE
Payer: COMMERCIAL

## 2025-07-08 DIAGNOSIS — M62.830 BACK MUSCLE SPASM: ICD-10-CM

## 2025-07-08 DIAGNOSIS — M54.41 RIGHT-SIDED LOW BACK PAIN WITH RIGHT-SIDED SCIATICA: Primary | ICD-10-CM

## 2025-07-08 PROCEDURE — 97110 THERAPEUTIC EXERCISES: CPT | Mod: GP | Performed by: PHYSICAL THERAPIST

## 2025-07-08 PROCEDURE — 97530 THERAPEUTIC ACTIVITIES: CPT | Mod: GP | Performed by: PHYSICAL THERAPIST

## 2025-07-08 PROCEDURE — 97162 PT EVAL MOD COMPLEX 30 MIN: CPT | Mod: GP | Performed by: PHYSICAL THERAPIST

## 2025-07-08 NOTE — PROGRESS NOTES
"PHYSICAL THERAPY EVALUATION  Type of Visit: Evaluation       Fall Risk Screen:  Have you fallen 2 or more times in the past year?: No  Have you fallen and had an injury in the past year?: No    Subjective         Presenting condition or subjective complaint: Back pain & stiffness  Date of onset: 06/23/25    Relevant medical history: Asthma; Pain at night or rest   Dates & types of surgery:  appendectomy, cholecystectomy    Prior diagnostic imaging/testing results:     none recent  Prior therapy history for the same diagnosis, illness or injury: Yes   Jan 2024    Prior Level of Function  Transfers: Independent  Ambulation: Independent  ADL: Independent    Living Environment  Social support: With a significant other or spouse   Type of home: House   Stairs to enter the home: Yes 3 Is there a railing: Yes     Ramp: No   Stairs inside the home: Yes 7 Is there a railing: Yes     Help at home: Home management tasks (cooking, cleaning)  Equipment owned:       Employment: Yes  - 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   We saw her in this clinic after an exacerbation 11-14-23 from spending 20' sweeping leaves off the deck.  This episode started 6-23-25, when getting out of shower.  She was reaching for an oversized towel to the right and had a \"zing\" in the low back and buttock. That morning she had a significant asthma/coughing episode prior to her shower.  She went to  that day, was given a steroid dose pack which helped temporarily.  Due to continued symptoms, returned to  7-1-25 and was given a second dose pack - finished 1-2 days ago.  She tried REIL and was improving, but then symptoms exacerbated again.  At this time pain is constant right>left low back and right buttock/lateral hip and yesterday and today symptoms extended " intermittently to the foot on the right.  Se denies left leg symptoms, but can be in the left buttock.  Pain ranges 4-6/10.  Symptoms increase with sitting almost immediately, sit-stand intermittently, standing 5', bending, stairs (needing railings).  Symptoms decrease with heating pad.      Objective   Posture/alignment  Sitting:  Fair-poor, uncomfortable, moving often   Standing:  No trunk shift, slight trunk-flexed posture, no lumbar lordosis    Gait  Assistive device:  none  Deviation:  slight trunk-flexed posture    Lumbar AROM  Flexion:  Hands to knees, guarded, increase LBP  Extension:  50%  Right sideglide:  min loss  Left sideglide:  WNL    Lumbar Dermatomes  WNL/symetrical to light touch    Lumbar Myotomes  WNL/symetrical      Slump test  Right  positive  Left  Negative     Repeated movement testing  Symptoms prior to test movements: Pain right LB, hip, buttock   Correction of sitting posture:  no effect, continued uncomfortable/near constant movement   Prone:  increased central LBP, unable to tolerate   Prone over 1 pillow:  increased central LBP, unable to tolerate   Prone over 3 pillows:  minimal decrease pain, unable to tolerate   Flex/rotation in right sidelying:  decrease pain/better, increase ease with transitions      Assessment & Plan   CLINICAL IMPRESSIONS  Medical Diagnosis: Back muscle spasm    Treatment Diagnosis: Right low back pain with right sciatica   Impression/Assessment: Patient is a 55 year old female with right LB and LE complaints.  The following significant findings have been identified: Pain, Decreased ROM/flexibility, Inflammation, Decreased activity tolerance, and Impaired posture. These impairments interfere with their ability to perform self care tasks, work tasks, recreational activities, and household chores as compared to previous level of function.     Clinical Decision Making (Complexity):  Clinical Presentation: Evolving/Changing  Clinical Presentation Rationale: based on  medical and personal factors listed in PT evaluation  Clinical Decision Making (Complexity): Moderate complexity    PLAN OF CARE  Treatment Interventions:  Interventions: Neuromuscular Re-education, Therapeutic Activity, Therapeutic Exercise, Self-Care/Home Management    Long Term Goals     PT Goal 1  Goal Identifier: Sitting  Goal Description: Patient will be able to sit 30' with 1/10 pain or less  Rationale: to maximize safety and independence with performance of ADLs and functional tasks;to maximize safety and independence within the home;to maximize safety and independence with self cares;to maximize safety and independence with transportation  Target Date: 09/02/25  PT Goal 2  Goal Identifier: Standing  Goal Description: Patient will be able to stand 20 with 2/10 pain or less  Rationale: to maximize safety and independence with performance of ADLs and functional tasks;to maximize safety and independence within the home;to maximize safety and independence within the community;to maximize safety and independence with self cares  Target Date: 09/02/25      Frequency of Treatment: 1x/week for 4 weeks then 2x/month for 2 visits  Duration of Treatment: 8 weeks      Education Assessment:   Learner/Method: Patient;Listening;Reading;Demonstration;Pictures/Video;No Barriers to Learning    Risks and benefits of evaluation/treatment have been explained.   Patient/Family/caregiver agrees with Plan of Care.     Evaluation Time:     PT Eval, Moderate Complexity Minutes (95055): 25       Signing Clinician: Lashell Monsivais PT

## 2025-07-09 ENCOUNTER — VIRTUAL VISIT (OUTPATIENT)
Dept: INTERNAL MEDICINE | Facility: CLINIC | Age: 55
End: 2025-07-09
Payer: COMMERCIAL

## 2025-07-09 DIAGNOSIS — M54.50 CHRONIC LUMBOSACRAL PAIN: Primary | ICD-10-CM

## 2025-07-09 DIAGNOSIS — G89.29 CHRONIC LUMBOSACRAL PAIN: Primary | ICD-10-CM

## 2025-07-09 PROCEDURE — 98005 SYNCH AUDIO-VIDEO EST LOW 20: CPT | Performed by: INTERNAL MEDICINE

## 2025-07-09 NOTE — PROGRESS NOTES
Edith is a 55 year old who is being evaluated via a billable video visit.    How would you like to obtain your AVS? MyChart  If the video visit is dropped, the invitation should be resent by: Text to cell phone: [phone]  Will anyone else be joining your video visit? No    Assessment & Plan   Chronic lumbosacral pain  Continued back pain despite medications + PT. Offered imaging, patient declined for now. Recommended spine consultation as next step, referral placed. If symptoms worsen, recommend repeat in-person evaluation with any available provider.  - Spine  Referral; Future    Signed Electronically by:  Leroy Yadav MD, MPH  [facility]  Internal Medicine    The longitudinal plan of care for the diagnosis(es)/condition(s) as documented were addressed during this visit. Due to the added complexity in care, I will continue to support Edith in the subsequent management and with ongoing continuity of care.    Subjective   Edith is a 55 year old who presents for a same day acute care virtual video visit with chief concern of: Musculoskeletal Problem    History of Present Illness       Reason for visit:  Lower back pain that was getting better but than started getting worst     Saw Urgent Care on June 23 and July 1 for back pain. Referred to PT. Saw PT yesterday. Was improving. Worsened again this morning. Completed 2 Medrol Dosepaks. Improved while on the medication and then worsened when medication stopped. Feels this is a muscle strain. Does not think it's bone.        Objective     Vitals: No vitals were obtained today due to virtual visit.    Physical Exam   GENERAL: alert and no distress  EYES: Eyes grossly normal to inspection.  No discharge or erythema, or obvious scleral/conjunctival abnormalities.  RESP: No audible wheeze, cough, or visible cyanosis.    SKIN: Visible skin clear. No significant rash, abnormal pigmentation or lesions.  NEURO: Cranial nerves grossly  intact.  Mentation and speech appropriate for age.  PSYCH: Appropriate affect, tone, and pace of words    Video-Visit Details  Type of service: Video Visit   Originating Location (pt. Location): Home  Distant Location (provider location):  Off-site  [facility]

## 2025-07-14 ENCOUNTER — OFFICE VISIT (OUTPATIENT)
Dept: CARDIOLOGY | Facility: CLINIC | Age: 55
End: 2025-07-14
Attending: INTERNAL MEDICINE
Payer: COMMERCIAL

## 2025-07-14 VITALS
WEIGHT: 196 LBS | DIASTOLIC BLOOD PRESSURE: 83 MMHG | HEIGHT: 66 IN | BODY MASS INDEX: 31.5 KG/M2 | OXYGEN SATURATION: 98 % | RESPIRATION RATE: 18 BRPM | SYSTOLIC BLOOD PRESSURE: 140 MMHG | HEART RATE: 92 BPM

## 2025-07-14 DIAGNOSIS — Z86.79 HISTORY OF VENTRICULAR TACHYCARDIA: ICD-10-CM

## 2025-07-14 PROCEDURE — 3077F SYST BP >= 140 MM HG: CPT | Performed by: INTERNAL MEDICINE

## 2025-07-14 PROCEDURE — G2211 COMPLEX E/M VISIT ADD ON: HCPCS | Performed by: INTERNAL MEDICINE

## 2025-07-14 PROCEDURE — 99203 OFFICE O/P NEW LOW 30 MIN: CPT | Performed by: INTERNAL MEDICINE

## 2025-07-14 PROCEDURE — 3079F DIAST BP 80-89 MM HG: CPT | Performed by: INTERNAL MEDICINE

## 2025-07-14 NOTE — LETTER
"7/14/2025    Leroy Green MD  [address]  [facility] [address]    RE: Xenia Snow       Dear Colleague,     I had the pleasure of seeing Xenia Snow in the [facility].    Electrophysiology Clinic Progress Note    Xenia Snow MRN# [id number]   YOB: 1970 Age: 55 year old     Primary cardiologist:          Assessment and Plan   Delightful 54 yo female with h/o palpitations who was eval at a local ED when she lived in [place] in 2019. Pt was told to have a \"6 seconds \" of SVT captured on tele and was given Metoprolol. Subsequent zio showed rare pacs/pvcs. She has moved to [place] for the past few yrs now and has not had any palpitations like before. Zio from last May show one single run of 18 beats of SVT for which she did not have any sxs. For some reasons Dr. Green mentioned in his note that she has a history of \"VT\". I believed this was a typo. Pt never has VT but just very brief run of SVT which is quite benign. Pt was reassured and see ep prn.       The longitudinal plan of care of the diagnosis(es)/condition (s) as documented were addressed during this visit. Due to the added complexity in care, I will continue to support the patient in the subsequent management and with ongoing continuity of care.       Review of Systems     12-pt ROS is negative except for as noted in the HPI.          Physical Exam     Vitals: BP (!) 140/83   Pulse 92   Resp 18   Ht 1.676 m (5' 6\")   Wt 88.9 kg (196 lb)   LMP 06/05/2025   SpO2 98%   BMI 31.64 kg/m    Wt Readings from Last 10 Encounters:   07/14/25 88.9 kg (196 lb)   07/01/25 88.9 kg (196 lb)   06/23/25 88.9 kg (196 lb)   04/24/25 94.1 kg (207 lb 6.4 oz)   03/20/25 93.2 kg (205 lb 6.4 oz)   03/17/25 90.7 kg (200 lb)   03/15/25 91.8 kg (202 lb 6.4 oz)   01/07/25 90.2 kg (198 lb 12.8 oz)   12/07/24 88.5 kg (195 lb)   09/19/23 85.2 kg (187 lb 12.8 oz)       Constitutional:  Patient is pleasant, alert, cooperative, " and in NAD.  HEENT:  NCAT. PERRLA. EOM's intact.   Neck:  CVP appears normal. No carotid bruits.   Pulmonary: Normal respiratory effort. CTAB.   Cardiac: RRR, normal S1/S2, no S3/S4, no murmur or rub.   Abdomen:  Non-tender abdomen, no hepatosplenomegaly appreciated.   Vascular: Pulses in the upper and lower extremities are 2+ and equal bilaterally.  Extremities: No edema, erythema, cyanosis or tenderness appreciated.  Skin:  No rashes or lesions appreciated.   Neurological:  No gross motor or sensory deficits.   Psych: Appropriate affect.          Data   Labs reviewed:  Recent Labs   Lab Test 01/07/25  0911 12/02/23  0910 01/25/22  0822 01/21/21  0833 01/02/20  0834   LDL 89 119*  --    < > 119*   HDL 39* 40* 30*   < > 46*   NHDL 149* 168* 153*   < > 182*   CHOL 188 208* 183   < > 228*   TRIG 430* 244* 516*   < > 315*   TSH  --   --   --   --  2.24   IRON  --   --   --   --  40   FEB  --   --   --   --  396   IRONSAT  --   --   --   --  10*   CASSANDRA  --   --   --   --  7*    < > = values in this interval not displayed.       Lab Results   Component Value Date    WBC 8.7 07/10/2022    WBC 7.9 01/23/2020    RBC 3.98 07/10/2022    RBC 4.35 01/23/2020    HGB 11.7 07/10/2022    HGB 12.5 01/23/2020    HCT 36.1 07/10/2022    HCT 38.5 01/23/2020    MCV 91 07/10/2022    MCV 89 01/23/2020    MCH 29.4 07/10/2022    MCH 28.7 01/23/2020    MCHC 32.4 07/10/2022    MCHC 32.5 01/23/2020    RDW 13.9 07/10/2022    RDW 15.0 01/23/2020     07/10/2022     01/23/2020       Lab Results   Component Value Date     01/07/2025     01/21/2021    POTASSIUM 4.3 01/07/2025    POTASSIUM 4.2 06/27/2022    POTASSIUM 3.8 01/21/2021    CHLORIDE 104 01/07/2025    CHLORIDE 108 06/27/2022    CHLORIDE 106 01/21/2021    CO2 24 01/07/2025    CO2 27 06/27/2022    CO2 27 01/21/2021    ANIONGAP 10 01/07/2025    ANIONGAP 4 06/27/2022    ANIONGAP 5 01/21/2021     (H) 01/07/2025     (H) 07/18/2022     (H) 06/27/2022      (H) 01/21/2021    BUN 14.8 01/07/2025    BUN 14 06/27/2022    BUN 12 01/21/2021    CR 0.89 01/07/2025    CR 0.98 01/21/2021    GFRESTIMATED 78 01/07/2025    GFRESTIMATED 68 01/21/2021    GFRESTBLACK 78 01/21/2021    AIYANA 9.1 01/07/2025    AIYANA 8.9 01/21/2021      Lab Results   Component Value Date    AST 20 12/02/2023    AST 12 01/21/2021    ALT 20 01/07/2025    ALT 19 01/21/2021       Lab Results   Component Value Date    A1C 5.8 (H) 01/07/2025       Lab Results   Component Value Date    INR 0.90 01/23/2020            Problem List     Patient Active Problem List   Diagnosis     History of ventricular tachycardia     Obesity (BMI 30-39.9)     Hypertriglyceridemia     Bilateral low back pain without sciatica     Bilateral thoracic back pain     Moderate persistent asthma without complication     Prediabetes     Right-sided low back pain with right-sided sciatica            Medications     Current Outpatient Medications   Medication Sig Dispense Refill     albuterol (PROAIR HFA/PROVENTIL HFA/VENTOLIN HFA) 108 (90 Base) MCG/ACT inhaler Inhale 2 puffs into the lungs every 6 hours as needed for shortness of breath, wheezing or cough. 18 g 0     albuterol (PROAIR HFA/PROVENTIL HFA/VENTOLIN HFA) 108 (90 Base) MCG/ACT inhaler Inhale 2 puffs into the lungs every 6 hours as needed. 18 g 0     budesonide-formoterol (SYMBICORT/BREYNA) 160-4.5 MCG/ACT Inhaler Inhale 1 puff TWICE daily every day and add 2 puffs every 4 to 6 hours as needed for shortness of breath/symptoms (max 12 puffs per day) 20.4 g 4     ibuprofen (ADVIL/MOTRIN) 200 MG tablet Take 400 mg by mouth every 4 hours as needed for mild pain       loratadine (CLARITIN) 10 MG tablet Take 1 tablet (10 mg) by mouth daily 90 tablet 3     metoprolol succinate ER (TOPROL XL) 25 MG 24 hr tablet Take 1 tablet (25 mg) by mouth daily. 90 tablet 3            Past Medical History     Past Medical History:   Diagnosis Date     History of ventricular tachycardia     on Toprol      Hypertriglyceridemia      Impaired fasting glucose      Obesity (BMI 30-39.9)      Uncomplicated asthma 2015    Under control.     Past Surgical History:   Procedure Laterality Date     COLONOSCOPY N/A 8/25/2022    Procedure: COLONOSCOPY with polypectomies using exacto cold snare;  Surgeon: Tucker Sánchez MD;  Location: [facility]     DILATION AND CURETTAGE       DILATION AND EVACUATION       ENTEROSCOPY SMALL BOWEL N/A 7/18/2022    Procedure: ENTEROSCOPY;  Surgeon: Silverio Ziegler MD;  Location: [facility]     ESOPHAGOSCOPY, GASTROSCOPY, DUODENOSCOPY (EGD), COMBINED N/A 7/18/2022    Procedure: ESOPHAGOGASTRODUODENOSCOPY, WITH FINE NEEDLE ASPIRATION BIOPSY, WITH ENDOSCOPIC ULTRASOUND GUIDANCE;  Surgeon: Silverio Ziegler MD;  Location: [facility]     LAPAROSCOPIC APPENDECTOMY  2018     LAPAROSCOPIC CHOLECYSTECTOMY  2017     Family History   Problem Relation Age of Onset     Hypertension Mother      Hyperlipidemia Mother      Breast Cancer Mother         post-menopausal     Skin Cancer Father         unknown types     Pacemaker Father      Valvular heart disease Father      Coronary Artery Disease Maternal Grandmother         s/p CABG later in life     Myocardial Infarction Maternal Grandfather         later in life     Breast Cancer Paternal Grandmother         post-menopausal     Myocardial Infarction Maternal Uncle         later in life     Rheumatoid Arthritis Paternal Aunt      Diabetes No family hx of      Coronary Artery Disease Early Onset No family hx of      Cerebrovascular Disease No family hx of      Ovarian Cancer No family hx of      Colon Cancer No family hx of      Social History     Socioeconomic History     Marital status:      Spouse name: Not on file     Number of children: Not on file     Years of education: Not on file     Highest education level: Not on file   Occupational History     Occupation: Consulting -    Tobacco Use     Smoking status: Never     Passive exposure: Never      Smokeless tobacco: Never   Vaping Use     Vaping status: Never Used   Substance and Sexual Activity     Alcohol use: Never     Drug use: Never     Sexual activity: Yes     Partners: Male     Birth control/protection: Male Surgical   Other Topics Concern     Parent/sibling w/ CABG, MI or angioplasty before 65F 55M? No   Social History Narrative    .    4 kids (3 adult, 1 high school).    Exercising sometimes.     Social Drivers of Health     Financial Resource Strain: Low Risk  (4/21/2025)    Financial Resource Strain      Within the past 12 months, have you or your family members you live with been unable to get utilities (heat, electricity) when it was really needed?: No   Food Insecurity: Low Risk  (4/21/2025)    Food Insecurity      Within the past 12 months, did you worry that your food would run out before you got money to buy more?: No      Within the past 12 months, did the food you bought just not last and you didn t have money to get more?: No   Transportation Needs: Low Risk  (4/21/2025)    Transportation Needs      Within the past 12 months, has lack of transportation kept you from medical appointments, getting your medicines, non-medical meetings or appointments, work, or from getting things that you need?: No   Physical Activity: Insufficiently Active (4/21/2025)    Exercise Vital Sign      Days of Exercise per Week: 3 days      Minutes of Exercise per Session: 30 min   Stress: No Stress Concern Present (4/21/2025)    [employer] [place] [employer] - Occupational Stress Questionnaire      Feeling of Stress : Only a little   Social Connections: Unknown (4/21/2025)    Social Connection and Isolation Panel [NHANES]      Frequency of Communication with Friends and Family: Not on file      Frequency of Social Gatherings with Friends and Family: Once a week      Attends Orthodox Services: Not on file      Active Member of Clubs or Organizations: Not on file      Attends Club or Organization  Meetings: Not on file      Marital Status: Not on file   Interpersonal Safety: Low Risk  (4/24/2025)    Interpersonal Safety      Do you feel physically and emotionally safe where you currently live?: Yes      Within the past 12 months, have you been hit, slapped, kicked or otherwise physically hurt by someone?: No      Within the past 12 months, have you been humiliated or emotionally abused in other ways by your partner or ex-partner?: No   Housing Stability: Low Risk  (4/21/2025)    Housing Stability      Do you have housing? : Yes      Are you worried about losing your housing?: No            Allergies   Amitiza [lubiprostone]    Today's clinic visit entailed:  The following tests were independently interpreted by me as noted in my documentation: ecg, zio, echo  30 minutes spent by me on the date of the encounter doing chart review, history and exam, documentation and further activities per the note  Provider  Link to [facility] Help Grid     The level of medical decision making during this visit was of moderate complexity.     Thank you for allowing me to participate in the care of your patient.      Sincerely,     Hal Mccloud MD     [facility]  cc:   Leroy Green MD  [address] [place] [id number]

## 2025-07-14 NOTE — PROGRESS NOTES
"  Electrophysiology Clinic Progress Note    Xenia Snow MRN# [id number]   YOB: 1970 Age: 55 year old     Primary cardiologist:          Assessment and Plan   Delightful 56 yo female with h/o palpitations who was eval at a local ED when she lived in [place] in 2019. Pt was told to have a \"6 seconds \" of SVT captured on tele and was given Metoprolol. Subsequent zio showed rare pacs/pvcs. She has moved to [place] for the past few yrs now and has not had any palpitations like before. Zio from last May show one single run of 18 beats of SVT for which she did not have any sxs. For some reasons Dr. Green mentioned in his note that she has a history of \"VT\". I believed this was a typo. Pt never has VT but just very brief run of SVT which is quite benign. Pt was reassured and see ep prn.       The longitudinal plan of care of the diagnosis(es)/condition (s) as documented were addressed during this visit. Due to the added complexity in care, I will continue to support the patient in the subsequent management and with ongoing continuity of care.       Review of Systems     12-pt ROS is negative except for as noted in the HPI.          Physical Exam     Vitals: BP (!) 140/83   Pulse 92   Resp 18   Ht 1.676 m (5' 6\")   Wt 88.9 kg (196 lb)   LMP 06/05/2025   SpO2 98%   BMI 31.64 kg/m    Wt Readings from Last 10 Encounters:   07/14/25 88.9 kg (196 lb)   07/01/25 88.9 kg (196 lb)   06/23/25 88.9 kg (196 lb)   04/24/25 94.1 kg (207 lb 6.4 oz)   03/20/25 93.2 kg (205 lb 6.4 oz)   03/17/25 90.7 kg (200 lb)   03/15/25 91.8 kg (202 lb 6.4 oz)   01/07/25 90.2 kg (198 lb 12.8 oz)   12/07/24 88.5 kg (195 lb)   09/19/23 85.2 kg (187 lb 12.8 oz)       Constitutional:  Patient is pleasant, alert, cooperative, and in NAD.  HEENT:  NCAT. PERRLA. EOM's intact.   Neck:  CVP appears normal. No carotid bruits.   Pulmonary: Normal respiratory effort. CTAB.   Cardiac: RRR, normal S1/S2, no S3/S4, no murmur or rub. "   Abdomen:  Non-tender abdomen, no hepatosplenomegaly appreciated.   Vascular: Pulses in the upper and lower extremities are 2+ and equal bilaterally.  Extremities: No edema, erythema, cyanosis or tenderness appreciated.  Skin:  No rashes or lesions appreciated.   Neurological:  No gross motor or sensory deficits.   Psych: Appropriate affect.          Data   Labs reviewed:  Recent Labs   Lab Test 01/07/25  0911 12/02/23  0910 01/25/22  0822 01/21/21  0833 01/02/20  0834   LDL 89 119*  --    < > 119*   HDL 39* 40* 30*   < > 46*   NHDL 149* 168* 153*   < > 182*   CHOL 188 208* 183   < > 228*   TRIG 430* 244* 516*   < > 315*   TSH  --   --   --   --  2.24   IRON  --   --   --   --  40   FEB  --   --   --   --  396   IRONSAT  --   --   --   --  10*   CASSANDRA  --   --   --   --  7*    < > = values in this interval not displayed.       Lab Results   Component Value Date    WBC 8.7 07/10/2022    WBC 7.9 01/23/2020    RBC 3.98 07/10/2022    RBC 4.35 01/23/2020    HGB 11.7 07/10/2022    HGB 12.5 01/23/2020    HCT 36.1 07/10/2022    HCT 38.5 01/23/2020    MCV 91 07/10/2022    MCV 89 01/23/2020    MCH 29.4 07/10/2022    MCH 28.7 01/23/2020    MCHC 32.4 07/10/2022    MCHC 32.5 01/23/2020    RDW 13.9 07/10/2022    RDW 15.0 01/23/2020     07/10/2022     01/23/2020       Lab Results   Component Value Date     01/07/2025     01/21/2021    POTASSIUM 4.3 01/07/2025    POTASSIUM 4.2 06/27/2022    POTASSIUM 3.8 01/21/2021    CHLORIDE 104 01/07/2025    CHLORIDE 108 06/27/2022    CHLORIDE 106 01/21/2021    CO2 24 01/07/2025    CO2 27 06/27/2022    CO2 27 01/21/2021    ANIONGAP 10 01/07/2025    ANIONGAP 4 06/27/2022    ANIONGAP 5 01/21/2021     (H) 01/07/2025     (H) 07/18/2022     (H) 06/27/2022     (H) 01/21/2021    BUN 14.8 01/07/2025    BUN 14 06/27/2022    BUN 12 01/21/2021    CR 0.89 01/07/2025    CR 0.98 01/21/2021    GFRESTIMATED 78 01/07/2025    GFRESTIMATED 68 01/21/2021     GFRESTBLACK 78 01/21/2021    AIYANA 9.1 01/07/2025    AIYANA 8.9 01/21/2021      Lab Results   Component Value Date    AST 20 12/02/2023    AST 12 01/21/2021    ALT 20 01/07/2025    ALT 19 01/21/2021       Lab Results   Component Value Date    A1C 5.8 (H) 01/07/2025       Lab Results   Component Value Date    INR 0.90 01/23/2020            Problem List     Patient Active Problem List   Diagnosis    History of ventricular tachycardia    Obesity (BMI 30-39.9)    Hypertriglyceridemia    Bilateral low back pain without sciatica    Bilateral thoracic back pain    Moderate persistent asthma without complication    Prediabetes    Right-sided low back pain with right-sided sciatica            Medications     Current Outpatient Medications   Medication Sig Dispense Refill    albuterol (PROAIR HFA/PROVENTIL HFA/VENTOLIN HFA) 108 (90 Base) MCG/ACT inhaler Inhale 2 puffs into the lungs every 6 hours as needed for shortness of breath, wheezing or cough. 18 g 0    albuterol (PROAIR HFA/PROVENTIL HFA/VENTOLIN HFA) 108 (90 Base) MCG/ACT inhaler Inhale 2 puffs into the lungs every 6 hours as needed. 18 g 0    budesonide-formoterol (SYMBICORT/BREYNA) 160-4.5 MCG/ACT Inhaler Inhale 1 puff TWICE daily every day and add 2 puffs every 4 to 6 hours as needed for shortness of breath/symptoms (max 12 puffs per day) 20.4 g 4    ibuprofen (ADVIL/MOTRIN) 200 MG tablet Take 400 mg by mouth every 4 hours as needed for mild pain      loratadine (CLARITIN) 10 MG tablet Take 1 tablet (10 mg) by mouth daily 90 tablet 3    metoprolol succinate ER (TOPROL XL) 25 MG 24 hr tablet Take 1 tablet (25 mg) by mouth daily. 90 tablet 3            Past Medical History     Past Medical History:   Diagnosis Date    History of ventricular tachycardia     on Toprol    Hypertriglyceridemia     Impaired fasting glucose     Obesity (BMI 30-39.9)     Uncomplicated asthma 2015    Under control.     Past Surgical History:   Procedure Laterality Date    COLONOSCOPY N/A  8/25/2022    Procedure: COLONOSCOPY with polypectomies using exacto cold snare;  Surgeon: Tucker Sánchez MD;  Location: [facility]    DILATION AND CURETTAGE      DILATION AND EVACUATION      ENTEROSCOPY SMALL BOWEL N/A 7/18/2022    Procedure: ENTEROSCOPY;  Surgeon: Silverio Ziegler MD;  Location: [facility]    ESOPHAGOSCOPY, GASTROSCOPY, DUODENOSCOPY (EGD), COMBINED N/A 7/18/2022    Procedure: ESOPHAGOGASTRODUODENOSCOPY, WITH FINE NEEDLE ASPIRATION BIOPSY, WITH ENDOSCOPIC ULTRASOUND GUIDANCE;  Surgeon: Silverio Ziegler MD;  Location: [facility]    LAPAROSCOPIC APPENDECTOMY  2018    LAPAROSCOPIC CHOLECYSTECTOMY  2017     Family History   Problem Relation Age of Onset    Hypertension Mother     Hyperlipidemia Mother     Breast Cancer Mother         post-menopausal    Skin Cancer Father         unknown types    Pacemaker Father     Valvular heart disease Father     Coronary Artery Disease Maternal Grandmother         s/p CABG later in life    Myocardial Infarction Maternal Grandfather         later in life    Breast Cancer Paternal Grandmother         post-menopausal    Myocardial Infarction Maternal Uncle         later in life    Rheumatoid Arthritis Paternal Aunt     Diabetes No family hx of     Coronary Artery Disease Early Onset No family hx of     Cerebrovascular Disease No family hx of     Ovarian Cancer No family hx of     Colon Cancer No family hx of      Social History     Socioeconomic History    Marital status:      Spouse name: Not on file    Number of children: Not on file    Years of education: Not on file    Highest education level: Not on file   Occupational History    Occupation: Consulting -    Tobacco Use    Smoking status: Never     Passive exposure: Never    Smokeless tobacco: Never   Vaping Use    Vaping status: Never Used   Substance and Sexual Activity    Alcohol use: Never    Drug use: Never    Sexual activity: Yes     Partners: Male     Birth control/protection: Male Surgical   Other  Topics Concern    Parent/sibling w/ CABG, MI or angioplasty before 65F 55M? No   Social History Narrative    .    4 kids (3 adult, 1 high school).    Exercising sometimes.     Social Drivers of Health     Financial Resource Strain: Low Risk  (4/21/2025)    Financial Resource Strain     Within the past 12 months, have you or your family members you live with been unable to get utilities (heat, electricity) when it was really needed?: No   Food Insecurity: Low Risk  (4/21/2025)    Food Insecurity     Within the past 12 months, did you worry that your food would run out before you got money to buy more?: No     Within the past 12 months, did the food you bought just not last and you didn t have money to get more?: No   Transportation Needs: Low Risk  (4/21/2025)    Transportation Needs     Within the past 12 months, has lack of transportation kept you from medical appointments, getting your medicines, non-medical meetings or appointments, work, or from getting things that you need?: No   Physical Activity: Insufficiently Active (4/21/2025)    Exercise Vital Sign     Days of Exercise per Week: 3 days     Minutes of Exercise per Session: 30 min   Stress: No Stress Concern Present (4/21/2025)    [employer] [place] [employer] - Occupational Stress Questionnaire     Feeling of Stress : Only a little   Social Connections: Unknown (4/21/2025)    Social Connection and Isolation Panel [NHANES]     Frequency of Communication with Friends and Family: Not on file     Frequency of Social Gatherings with Friends and Family: Once a week     Attends Synagogue Services: Not on file     Active Member of Clubs or Organizations: Not on file     Attends Club or Organization Meetings: Not on file     Marital Status: Not on file   Interpersonal Safety: Low Risk  (4/24/2025)    Interpersonal Safety     Do you feel physically and emotionally safe where you currently live?: Yes     Within the past 12 months, have you been hit,  slapped, kicked or otherwise physically hurt by someone?: No     Within the past 12 months, have you been humiliated or emotionally abused in other ways by your partner or ex-partner?: No   Housing Stability: Low Risk  (4/21/2025)    Housing Stability     Do you have housing? : Yes     Are you worried about losing your housing?: No            Allergies   Amitiza [lubiprostone]    Today's clinic visit entailed:  The following tests were independently interpreted by me as noted in my documentation: ecg, zio, echo  30 minutes spent by me on the date of the encounter doing chart review, history and exam, documentation and further activities per the note  Provider  Link to MDM Help Grid     The level of medical decision making during this visit was of moderate complexity.

## 2025-07-16 ENCOUNTER — THERAPY VISIT (OUTPATIENT)
Dept: PHYSICAL THERAPY | Facility: CLINIC | Age: 55
End: 2025-07-16
Payer: COMMERCIAL

## 2025-07-16 DIAGNOSIS — M54.41 RIGHT-SIDED LOW BACK PAIN WITH RIGHT-SIDED SCIATICA, UNSPECIFIED CHRONICITY: Primary | ICD-10-CM

## 2025-07-16 PROCEDURE — 97110 THERAPEUTIC EXERCISES: CPT | Mod: GP | Performed by: PHYSICAL THERAPIST

## 2025-07-16 PROCEDURE — 97530 THERAPEUTIC ACTIVITIES: CPT | Mod: GP | Performed by: PHYSICAL THERAPIST

## 2025-07-21 ENCOUNTER — THERAPY VISIT (OUTPATIENT)
Dept: PHYSICAL THERAPY | Facility: CLINIC | Age: 55
End: 2025-07-21
Attending: FAMILY MEDICINE
Payer: COMMERCIAL

## 2025-07-21 DIAGNOSIS — M54.41 RIGHT-SIDED LOW BACK PAIN WITH RIGHT-SIDED SCIATICA: Primary | ICD-10-CM

## 2025-07-21 PROCEDURE — 97110 THERAPEUTIC EXERCISES: CPT | Mod: GP | Performed by: PHYSICAL THERAPIST

## 2025-07-21 PROCEDURE — 97530 THERAPEUTIC ACTIVITIES: CPT | Mod: GP | Performed by: PHYSICAL THERAPIST

## 2025-07-28 ENCOUNTER — PATIENT OUTREACH (OUTPATIENT)
Dept: CARE COORDINATION | Facility: CLINIC | Age: 55
End: 2025-07-28
Payer: COMMERCIAL

## 2025-07-30 ENCOUNTER — THERAPY VISIT (OUTPATIENT)
Dept: PHYSICAL THERAPY | Facility: CLINIC | Age: 55
End: 2025-07-30
Payer: COMMERCIAL

## 2025-07-30 DIAGNOSIS — M54.41 RIGHT-SIDED LOW BACK PAIN WITH RIGHT-SIDED SCIATICA, UNSPECIFIED CHRONICITY: Primary | ICD-10-CM

## 2025-07-30 PROCEDURE — 97110 THERAPEUTIC EXERCISES: CPT | Mod: GP | Performed by: PHYSICAL THERAPIST

## 2025-07-30 PROCEDURE — 97530 THERAPEUTIC ACTIVITIES: CPT | Mod: GP | Performed by: PHYSICAL THERAPIST

## 2025-08-04 ENCOUNTER — PATIENT OUTREACH (OUTPATIENT)
Dept: CARE COORDINATION | Facility: CLINIC | Age: 55
End: 2025-08-04
Payer: COMMERCIAL

## 2025-08-06 ENCOUNTER — THERAPY VISIT (OUTPATIENT)
Dept: PHYSICAL THERAPY | Facility: CLINIC | Age: 55
End: 2025-08-06
Payer: COMMERCIAL

## 2025-08-06 ENCOUNTER — PATIENT OUTREACH (OUTPATIENT)
Dept: CARE COORDINATION | Facility: CLINIC | Age: 55
End: 2025-08-06

## 2025-08-06 DIAGNOSIS — M54.41 RIGHT-SIDED LOW BACK PAIN WITH RIGHT-SIDED SCIATICA, UNSPECIFIED CHRONICITY: Primary | ICD-10-CM

## 2025-08-06 PROCEDURE — 97110 THERAPEUTIC EXERCISES: CPT | Mod: GP | Performed by: PHYSICAL THERAPIST

## 2025-08-06 PROCEDURE — 97530 THERAPEUTIC ACTIVITIES: CPT | Mod: GP | Performed by: PHYSICAL THERAPIST

## 2025-12-10 ENCOUNTER — PRE VISIT (OUTPATIENT)
Dept: OTOLARYNGOLOGY | Facility: CLINIC | Age: 55
End: 2025-12-10

## (undated) DEVICE — ENDO NDL ASPIRATION ULTRASOUND 22GA ACQUIRE M00555540

## (undated) DEVICE — ENDO TUBING CO2 SMARTCAP STERILE DISP 100145CO2EXT

## (undated) DEVICE — SUCTION MANIFOLD NEPTUNE 2 SYS 4 PORT 0702-020-000

## (undated) DEVICE — ENDO BITE BLOCK ADULT OMNI-BLOC

## (undated) DEVICE — ENDO TRAP POLYP QUICK CATCH 710201

## (undated) DEVICE — ENDO SNARE EXACTO COLD 9MM LOOP 2.4MMX230CM 00711115

## (undated) DEVICE — SOL WATER IRRIG 1000ML BOTTLE 2F7114

## (undated) DEVICE — KIT ENDO TURNOVER/PROCEDURE W/CLEAN A SCOPE LINERS 103888

## (undated) DEVICE — KIT CONNECTOR FOR OLYMPUS ENDOSCOPES DEFENDO 100310

## (undated) DEVICE — ENDO CAP AND TUBING STERILE FOR ENDOGATOR  100130

## (undated) DEVICE — PACK ENDOSCOPY GI CUSTOM UMMC

## (undated) DEVICE — ENDO DEVICE LOCKING AND BIOPSY CAP M00545261

## (undated) DEVICE — ENDO PROBE COVER ULTRASOUND BALLOON LATEX  MAJ-249

## (undated) DEVICE — PAD CHUX UNDERPAD 23X24" 7136

## (undated) DEVICE — KIT ENDO FIRST STEP DISINFECTANT 200ML W/POUCH EP-4

## (undated) RX ORDER — PROPOFOL 10 MG/ML
INJECTION, EMULSION INTRAVENOUS
Status: DISPENSED
Start: 2022-07-18

## (undated) RX ORDER — FENTANYL CITRATE 0.05 MG/ML
INJECTION, SOLUTION INTRAMUSCULAR; INTRAVENOUS
Status: DISPENSED
Start: 2022-08-25

## (undated) RX ORDER — FENTANYL CITRATE 50 UG/ML
INJECTION, SOLUTION INTRAMUSCULAR; INTRAVENOUS
Status: DISPENSED
Start: 2022-07-18

## (undated) RX ORDER — GLYCOPYRROLATE 0.2 MG/ML
INJECTION, SOLUTION INTRAMUSCULAR; INTRAVENOUS
Status: DISPENSED
Start: 2022-07-18

## (undated) RX ORDER — FENTANYL CITRATE-0.9 % NACL/PF 10 MCG/ML
PLASTIC BAG, INJECTION (ML) INTRAVENOUS
Status: DISPENSED
Start: 2022-07-18

## (undated) RX ORDER — SIMETHICONE 40MG/0.6ML
SUSPENSION, DROPS(FINAL DOSAGE FORM)(ML) ORAL
Status: DISPENSED
Start: 2022-07-18

## (undated) RX ORDER — CEFAZOLIN SODIUM 1 G/3ML
INJECTION, POWDER, FOR SOLUTION INTRAMUSCULAR; INTRAVENOUS
Status: DISPENSED
Start: 2022-07-18